# Patient Record
Sex: FEMALE | Race: WHITE | Employment: OTHER | URBAN - METROPOLITAN AREA
[De-identification: names, ages, dates, MRNs, and addresses within clinical notes are randomized per-mention and may not be internally consistent; named-entity substitution may affect disease eponyms.]

---

## 2017-04-11 ENCOUNTER — OFFICE VISIT (OUTPATIENT)
Dept: ORTHOPEDIC SURGERY | Age: 67
End: 2017-04-11

## 2017-04-11 VITALS
SYSTOLIC BLOOD PRESSURE: 137 MMHG | HEIGHT: 69 IN | BODY MASS INDEX: 27.55 KG/M2 | WEIGHT: 186 LBS | DIASTOLIC BLOOD PRESSURE: 70 MMHG | HEART RATE: 82 BPM | TEMPERATURE: 98.1 F | OXYGEN SATURATION: 99 % | RESPIRATION RATE: 18 BRPM

## 2017-04-11 DIAGNOSIS — M79.2 NEURITIS: ICD-10-CM

## 2017-04-11 DIAGNOSIS — M51.36 DDD (DEGENERATIVE DISC DISEASE), LUMBAR: ICD-10-CM

## 2017-04-11 DIAGNOSIS — M53.3 SACROILIAC JOINT PAIN: ICD-10-CM

## 2017-04-11 DIAGNOSIS — M54.5 LOW BACK PAIN, UNSPECIFIED BACK PAIN LATERALITY, UNSPECIFIED CHRONICITY, WITH SCIATICA PRESENCE UNSPECIFIED: Primary | ICD-10-CM

## 2017-04-11 DIAGNOSIS — M47.816 LUMBAR FACET ARTHROPATHY: ICD-10-CM

## 2017-04-11 PROBLEM — M54.50 LOW BACK PAIN: Status: ACTIVE | Noted: 2017-04-11

## 2017-04-11 RX ORDER — SERTRALINE HYDROCHLORIDE 50 MG/1
TABLET, FILM COATED ORAL
COMMUNITY
Start: 2017-03-14 | End: 2019-06-28

## 2017-04-11 RX ORDER — IBUPROFEN 200 MG
600 TABLET ORAL
COMMUNITY
End: 2020-03-03

## 2017-04-11 RX ORDER — BISMUTH SUBSALICYLATE 262 MG
1 TABLET,CHEWABLE ORAL DAILY
COMMUNITY

## 2017-04-11 RX ORDER — TRAMADOL HYDROCHLORIDE 50 MG/1
TABLET ORAL
COMMUNITY
Start: 2017-04-03 | End: 2019-06-28

## 2017-04-11 RX ORDER — PRAMIPEXOLE DIHYDROCHLORIDE 0.5 MG/1
0.5 TABLET ORAL
COMMUNITY
Start: 2017-03-14

## 2017-04-11 RX ORDER — CHOLECALCIFEROL (VITAMIN D3) 125 MCG
1 CAPSULE ORAL DAILY
COMMUNITY

## 2017-04-11 RX ORDER — PREDNISONE 5 MG/1
2.5 TABLET ORAL EVERY OTHER DAY
Status: ON HOLD | COMMUNITY
Start: 2017-03-07 | End: 2019-07-02

## 2017-04-11 RX ORDER — GABAPENTIN 300 MG/1
300 CAPSULE ORAL
COMMUNITY
Start: 2017-03-27 | End: 2017-07-12 | Stop reason: SDUPTHER

## 2017-04-11 RX ORDER — GABAPENTIN 300 MG/1
CAPSULE ORAL
Qty: 90 CAP | Refills: 1 | Status: SHIPPED | OUTPATIENT
Start: 2017-04-11 | End: 2017-04-27 | Stop reason: ALTCHOICE

## 2017-04-11 RX ORDER — PRAVASTATIN SODIUM 80 MG/1
80 TABLET ORAL
COMMUNITY
Start: 2017-03-14

## 2017-04-11 NOTE — PATIENT INSTRUCTIONS
Sentons Activation    Thank you for requesting access to Sentons. Please follow the instructions below to securely access and download your online medical record. Sentons allows you to send messages to your doctor, view your test results, renew your prescriptions, schedule appointments, and more. How Do I Sign Up? 1. In your internet browser, go to [email or web address]  2. Click on the First Time User? Click Here link in the Sign In box. You will be redirect to the New Member Sign Up page. 3. Enter your Sentons Access Code exactly as it appears below. You will not need to use this code after youve completed the sign-up process. If you do not sign up before the expiration date, you must request a new code. Sentons Access Code: [email or web address]  Expires: 7/10/2017 11:40 AM (This is the date your Sentons access code will )    4. Enter the last four digits of your Social Security Number (xxxx) and Date of Birth (mm/dd/yyyy) as indicated and click Submit. You will be taken to the next sign-up page. 5. Create a Sentons ID. This will be your Sentons login ID and cannot be changed, so think of one that is secure and easy to remember. 6. Create a Sentons password. You can change your password at any time. 7. Enter your Password Reset Question and Answer. This can be used at a later time if you forget your password. 8. Enter your e-mail address. You will receive e-mail notification when new information is available in [address] 9. Click Sign Up. You can now view and download portions of your medical record. 10. Click the Download Summary menu link to download a portable copy of your medical information. Additional Information    If you have questions, please visit the Frequently Asked Questions section of the Sentons website at [email or web address] Remember, Sentons is NOT to be used for urgent needs. For medical emergencies, dial 911.          Low Back Arthritis: Exercises  Your Care Instructions  Here are some examples of typical rehabilitation exercises for your condition. Start each exercise slowly. Ease off the exercise if you start to have pain. Your doctor or physical therapist will tell you when you can start these exercises and which ones will work best for you. When you are not being active, find a comfortable position for rest. Some people are comfortable on the floor or a medium-firm bed with a small pillow under their head and another under their knees. Some people prefer to lie on their side with a pillow between their knees. Don't stay in one position for too long. Take short walks (10 to 20 minutes) every 2 to 3 hours. Avoid slopes, hills, and stairs until you feel better. Walk only distances you can manage without pain, especially leg pain. How to do the exercises  Pelvic tilt    1. Lie on your back with your knees bent. 2. \"Brace\" your stomach--tighten your muscles by pulling in and imagining your belly button moving toward your spine. 3. Press your lower back into the floor. You should feel your hips and pelvis rock back. 4. Hold for 6 seconds while breathing smoothly. 5. Relax and allow your pelvis and hips to rock forward. 6. Repeat 8 to 12 times. Back stretches    1. Get down on your hands and knees on the floor. 2. Relax your head and allow it to droop. Round your back up toward the ceiling until you feel a nice stretch in your upper, middle, and lower back. Hold this stretch for as long as it feels comfortable, or about 15 to 30 seconds. 3. Return to the starting position with a flat back while you are on your hands and knees. 4. Let your back sway by pressing your stomach toward the floor. Lift your buttocks toward the ceiling. 5. Hold this position for 15 to 30 seconds. 6. Repeat 2 to 4 times. Follow-up care is a key part of your treatment and safety.  Be sure to make and go to all appointments, and call your doctor if you are having problems. It's also a good idea to know your test results and keep a list of the medicines you take. Where can you learn more? Go to http://candice-abby.info/. Enter R633 in the search box to learn more about \"Low Back Arthritis: Exercises. \"  Current as of: May 23, 2016  Content Version: 11.2  © 5888-7681 N42. Care instructions adapted under license by ParentsWare (which disclaims liability or warranty for this information). If you have questions about a medical condition or this instruction, always ask your healthcare professional. [place] [address] any warranty or liability for your use of this information.

## 2017-04-11 NOTE — MR AVS SNAPSHOT
Visit Information Date & Time Provider Department Dept. Phone Encounter #  
 4/11/2017 12:50 PM Marquez Garza MD [facility] [place] [id number] Follow-up Instructions Return in about 2 weeks (around 4/25/2017) for Diagnostic Test follow up, Medication follow up. Upcoming Health Maintenance Date Due Hepatitis C Screening 1950 DTaP/Tdap/Td series (1 - Tdap) 9/4/1969 BREAST CANCER SCRN MAMMOGRAM 9/4/1998 FOBT Q 1 YEAR AGE 50-75 9/4/1998 ZOSTER VACCINE AGE 60> 9/4/2008 GLAUCOMA SCREENING Q2Y 9/4/2013 OSTEOPOROSIS SCREENING (DEXA) 9/4/2013 Pneumococcal 65+ Low/Medium Risk (1 of 2 - PCV13) 9/4/2013 MEDICARE YEARLY EXAM 9/4/2013 INFLUENZA AGE 9 TO ADULT 8/1/2016 Allergies as of 4/11/2017  Review Complete On: 4/11/2017 By: Sharee Avina LPN No Known Allergies Current Immunizations  Never Reviewed No immunizations on file. Not reviewed this visit You Were Diagnosed With   
  
 Codes Comments Low back pain, unspecified back pain laterality, unspecified chronicity, with sciatica presence unspecified    -  Primary ICD-10-CM: M54.5 ICD-9-CM: 724.2 DDD (degenerative disc disease), lumbar     ICD-10-CM: M51.36 
ICD-9-CM: 722.52 Neuritis     ICD-10-CM: M79.2 ICD-9-CM: 729.2 Lumbar facet arthropathy (HCC)     ICD-10-CM: M12.88 ICD-9-CM: 721.3 Sacroiliac joint pain     ICD-10-CM: M53.3 ICD-9-CM: 724.6 Vitals BP Pulse Temp Resp Height(growth percentile) Weight(growth percentile)  
 137/70 82 98.1 °F (36.7 °C) (Oral) 18 5' 8.5\" (1.74 m) 186 lb (84.4 kg) SpO2 BMI OB Status Smoking Status 99% 27.87 kg/m2 Perimenopausal Never Smoker BMI and BSA Data Body Mass Index Body Surface Area  
 27.87 kg/m 2 2.02 m 2 [facility]erred [facility] Your Updated Medication List  
  
   
This list is accurate as of: 4/11/17  2:01 PM.  Always use your most recent med list. ADVIL 200 mg tablet Generic drug:  ibuprofen Take  by mouth. FISH  mg Cap Generic drug:  Omega-3 Fatty Acids Take  by mouth.  
  
 gabapentin 300 mg capsule Commonly known as:  NEURONTIN Take 300 mg by mouth nightly. multivitamin tablet Commonly known as:  ONE A DAY Take 1 Tab by mouth daily. pramipexole 0.5 mg tablet Commonly known as:  MIRAPEX  
  
 pravastatin 80 mg tablet Commonly known as:  PRAVACHOL  
  
 predniSONE 5 mg tablet Commonly known as:  DELTASONE  
  
 sertraline 50 mg tablet Commonly known as:  ZOLOFT  
  
 traMADol 50 mg tablet Commonly known as:  ULTRAM  
  
 VITAMIN D3 2,000 unit Tab Generic drug:  cholecalciferol (vitamin D3) Take  by mouth. We Performed the Following AMB POC XRAY, SPINE, LUMBOSACRAL; 4+ [id number] CPT(R)] Follow-up Instructions Return in about 2 weeks (around 4/25/2017) for Diagnostic Test follow up, Medication follow up. To-Do List   
 04/18/2017 Imaging:  MRI LUMB SPINE WO CONT Patient Instructions Northstar Bioscienceshart Activation Thank you for requesting access to [email or web address]. Please follow the instructions below to securely access and download your online medical record. [email or web address] allows you to send messages to your doctor, view your test results, renew your prescriptions, schedule appointments, and more. How Do I Sign Up? 1. In your internet browser, go to [email or web address] 
2. Click on the First Time User? Click Here link in the Sign In box. You will be redirect to the New Member Sign Up page. 3. Enter your [email or web address] Access Code exactly as it appears below. You will not need to use this code after youve completed the sign-up process. If you do not sign up before the expiration date, you must request a new code. 37coins Access Code: [email or web address] Expires: 7/10/2017 11:40 AM (This is the date your 37coins access code will ) 4. Enter the last four digits of your Social Security Number (xxxx) and Date of Birth (mm/dd/yyyy) as indicated and click Submit. You will be taken to the next sign-up page. 5. Create a 37coins ID. This will be your 37coins login ID and cannot be changed, so think of one that is secure and easy to remember. 6. Create a 37coins password. You can change your password at any time. 7. Enter your Password Reset Question and Answer. This can be used at a later time if you forget your password. 8. Enter your e-mail address. You will receive e-mail notification when new information is available in [address] 9. Click Sign Up. You can now view and download portions of your medical record. 10. Click the Download Summary menu link to download a portable copy of your medical information. Additional Information If you have questions, please visit the Frequently Asked Questions section of the 37coins website at [email or web address] Remember, 37coins is NOT to be used for urgent needs. For medical emergencies, dial 911. Low Back Arthritis: Exercises Your Care Instructions Here are some examples of typical rehabilitation exercises for your condition. Start each exercise slowly. Ease off the exercise if you start to have pain. Your doctor or physical therapist will tell you when you can start these exercises and which ones will work best for you. When you are not being active, find a comfortable position for rest. Some people are comfortable on the floor or a medium-firm bed with a small pillow under their head and another under their knees. Some people prefer to lie on their side with a pillow between their knees. Don't stay in one position for too long. Take short walks (10 to 20 minutes) every 2 to 3 hours.  Avoid slopes, hills, and stairs until you feel better. Walk only distances you can manage without pain, especially leg pain. How to do the exercises Pelvic tilt 1. Lie on your back with your knees bent. 2. \"Brace\" your stomachtighten your muscles by pulling in and imagining your belly button moving toward your spine. 3. Press your lower back into the floor. You should feel your hips and pelvis rock back. 4. Hold for 6 seconds while breathing smoothly. 5. Relax and allow your pelvis and hips to rock forward. 6. Repeat 8 to 12 times. Back stretches 1. Get down on your hands and knees on the floor. 2. Relax your head and allow it to droop. Round your back up toward the ceiling until you feel a nice stretch in your upper, middle, and lower back. Hold this stretch for as long as it feels comfortable, or about 15 to 30 seconds. 3. Return to the starting position with a flat back while you are on your hands and knees. 4. Let your back sway by pressing your stomach toward the floor. Lift your buttocks toward the ceiling. 5. Hold this position for 15 to 30 seconds. 6. Repeat 2 to 4 times. Follow-up care is a key part of your treatment and safety. Be sure to make and go to all appointments, and call your doctor if you are having problems. It's also a good idea to know your test results and keep a list of the medicines you take. Where can you learn more? Go to http://candice-abby.info/. Enter Q118 in the search box to learn more about \"Low Back Arthritis: Exercises. \" Current as of: May 23, 2016 Content Version: 11.2 © 8572-7325 Healthwise, Incorporated. Care instructions adapted under license by Sapient (which disclaims liability or warranty for this information). If you have questions about a medical condition or this instruction, always ask your healthcare professional. [address] any warranty or liability for your use of this information. Introducing [facility]! Tracy Perez introduces Nonabox patient portal. Now you can access parts of your medical record, email your doctor's office, and request medication refills online. 1. In your internet browser, go to [email or web address] 2. Click on the First Time User? Click Here link in the Sign In box. You will see the New Member Sign Up page. 3. Enter your Nonabox Access Code exactly as it appears below. You will not need to use this code after youve completed the sign-up process. If you do not sign up before the expiration date, you must request a new code. · Nonabox Access Code: [email or web address] Expires: 7/10/2017 11:40 AM 
 
4. Enter the last four digits of your Social Security Number (xxxx) and Date of Birth (mm/dd/yyyy) as indicated and click Submit. You will be taken to the next sign-up page. 5. Create a Nonabox ID. This will be your Nonabox login ID and cannot be changed, so think of one that is secure and easy to remember. 6. Create a Nonabox password. You can change your password at any time. 7. Enter your Password Reset Question and Answer. This can be used at a later time if you forget your password. 8. Enter your e-mail address. You will receive e-mail notification when new information is available in [address] 9. Click Sign Up. You can now view and download portions of your medical record. 10. Click the Download Summary menu link to download a portable copy of your medical information. If you have questions, please visit the Frequently Asked Questions section of the Nonabox website. Remember, Nonabox is NOT to be used for urgent needs. For medical emergencies, dial 911. Now available from your iPhone and Android! Please provide this summary of care documentation to your next provider. Your primary care clinician is listed as Nichelle Rodriguez. If you have any questions after today's visit, please call [phone].

## 2017-04-11 NOTE — PROGRESS NOTES
[facility]  [address] [place], [facility] [address]  Phone: [phone]  Fax: [phone]          HISTORY OF PRESENT ILLNESS:  Maggie Hernandez is a 74 y.o. female with history of lumbar pain. She c/o pain in the lower back, L>R, with numbness and shooting pain radiating down the left leg past the knee. Pt's pain is worse when sitting. She is currently in physical therapy and admits to experiencing relief with cervical traction. Pt reports that after her last office visit, she started to experience numbness in the right arm with severe pain in the neck and shoulders. She followed up with Dr. Grisel Dobson who then referred her to Dr. Kia Ulloa. Pt then had cervical fusion at C6-7 and reports that she has had a total of 4 cervical surgeries. Pt reports that she also had thoracic to have a rib removed and coccyx surgery but denies any prior lumbar surgery. Pt admits to experiencing \"loopiness,\" dizziness, and states that she fell when trying Lyrica. She has tried Neurontin 300 mg 1 tab daily and tolerated the medication. Pt reports that she was switched to Lyrica in order to better manage her symptoms. Pt at this time desires to proceed with a lumbar MRI and medication evaluation. Pain Scale: 5/10    PCP: Felice Adams DO       History reviewed. No pertinent past medical history. Social History     Social History    Marital status:      Spouse name: N/A    Number of children: N/A    Years of education: N/A     Occupational History    Not on file. Social History Main Topics    Smoking status: Never Smoker    Smokeless tobacco: Never Used    Alcohol use No    Drug use: No    Sexual activity: No     Other Topics Concern    Not on file     Social History Narrative           No Known Allergies      REVIEW OF SYSTEMS    Constitutional: Negative for fever, chills, or weight change. Respiratory: Negative for cough or shortness of breath. Cardiovascular: Negative for chest pain or palpitations. Gastrointestinal: Negative for acid reflux, change in bowel habits, or constipation. Genitourinary: Negative for dysuria and flank pain. Musculoskeletal: Positive for lumbar pain. Skin: Negative for rash. Neurological: Positive for numbness in the left leg. Negative for headaches or dizziness. Endo/Heme/Allergies: Negative for increased bruising. Psychiatric/Behavioral: Negative for difficulty with sleep. PHYSICAL EXAMINATION  Visit Vitals    /70    Pulse 82    Temp 98.1 °F (36.7 °C) (Oral)    Resp 18    Ht 5' 8.5\" (1.74 m)    Wt 186 lb (84.4 kg)    SpO2 99%    BMI 27.87 kg/m2       Constitutional: Awake, alert, and in no acute distress  Neurological: 1+ symmetrical DTRs in the upper extremities. 1+ symmetrical DTRs in the lower extremities. Sensation to light touch is intact. Negative Carmine's sign bilaterally. Skin: warm, dry, and intact. Musculoskeletal: Tenderness to palpation in the upper thoracic region. Tenderness to palpation in the lower lumbar region. Pain with palpation over the right SI joint. Mild pain with extension and axial loading, No different with forward flexion. No pain with internal or external rotation of her hips. Positive straight leg raise on the right. Biceps  Triceps Deltoids Wrist Ext Wrist Flex Hand Intrin   Right +4/5 +4/5 +4/5 +4/5 +4/5 +4/5   Left +4/5 +4/5 +4/5 +4/5 +4/5 +4/5      Hip Flex  Quads Hamstrings Ankle DF EHL Ankle PF   Right +4/5 +4/5 +4/5 +4/5 +4/5 +4/5   Left  4/5  4/5  4/5  4/5  4/5  4/5     IMAGING:    Lumbar spine 4V X-rays from 04/11/2107 were personally reviewed with the Pt and demonstrated:  1) Levoscoliosis. 2) Multilevel degenerative facets. 3) Sclerosis in both SI joints. 4) Mild degenerative findings in both hips. 5) Multilevel degenerative discs.     Lumbar spine MRI from 04/01/2014 was personally reviewed with the Pt and demonstrated:  FINDINGS:     Normal alignment and vertebral body heights. Nonpathologic marrow signal except  for mild endplate degenerative changes at L1-2 and L2-3. The conus medullaris  looks normal ending at L1. Cauda equina nerve roots look normal. Visualized  paraspinal soft tissues are unremarkable.     Spinal canal and neural foramen:     L1-2: Moderate disc height narrowing with diffuse bulging. Small  anterolaterally protruding osteophytes. Relative mild spinal canal narrowing  and minimal bilateral foraminal encroachment. No neural compression.     L2-3: Moderate disc height narrowing with diffuse bulging. Small  anterolaterally protruding osteophytes. Mild facet hypertrophy. Again there is  relative mild central spinal canal narrowing and minimal bilateral foraminal  encroachment however no neural compression.     L3-4: Minimal disc height narrowing with diffuse bulge. Mild facet and  ligamentum flavum hypertrophy. Prominent dorsal epidural fat. Overall there is  moderate flattening of the thecal sac. Mild left and minimal right foraminal  encroachment without neural compression.     L4-5: Small nonfocal disc protrusion with focus of hyperintensity in the right  posterolateral annulus consistent with an annular tear. No associated disc  protrusion. Mild facet hypertrophy. No spinal canal or foraminal narrowing.     L5-S1: No disc disease. Mild facet hypertrophy, right greater than left. No  spinal canal or foraminal narrowing.        IMPRESSION:     Moderate spinal canal stenosis at L3-4 on the basis of disc bulge and prominent  dorsal epidural fat.     Mild spinal canal narrowing at L1-2 and L2-3 on the basis of disc bulges.     No evidence of neural compression.     Annular tear at L4-5 but without associated focal disc herniation. ASSESSMENT   Karen Jones was seen today for back pain.     Diagnoses and all orders for this visit:    Low back pain, unspecified back pain laterality, unspecified chronicity, with sciatica presence unspecified  -     [[id number]] LS Spine 4V  -     MRI LUMB SPINE WO CONT; Future    DDD (degenerative disc disease), lumbar  -     MRI LUMB SPINE WO CONT; Future  -     gabapentin (NEURONTIN) 300 mg capsule; Take 1 cap po q am and 2 caps po q pm. Taper up as directed. Neuritis  -     MRI LUMB SPINE WO CONT; Future  -     diazePAM (VALIUM) 5 mg tablet; Take 1 tab po 30mins before procedure. May repeat x 1 if needed. Indications: anxiety  -     gabapentin (NEURONTIN) 300 mg capsule; Take 1 cap po q am and 2 caps po q pm. Taper up as directed. Lumbar facet arthropathy (HCC)  -     MRI LUMB SPINE WO CONT; Future    Sacroiliac joint pain       IMPRESSION AND PLAN:  Ying Hemphill is a 74 y.o. female with history of lumbar pain. She c/o pain in the lower back, L>R, with numbness and shooting pain radiating down the left leg past the knee. Pt had a fusion at C6-7 and reports that she has had a total of 4 cervical surgeries. Pt reports that she also had thoracic to have a rib removed and coccyx surgery but denies any prior lumbar surgery. 1) Pt was given information on lumbar arthritis exercises. 2) Discussed treatment options with the Pt, including medication and steroid injections. 3) A sitting lumbar MRI was ordered. 4) She was prescribed Neurontin 300 mg 1 tab QAM and 2 tabs QHS, tapering up as directed. 5) Ms. Penny Neri has a reminder for a \"due or due soon\" health maintenance. I have asked that she contact her primary care provider, Dandy Zamudio DO, for follow-up on this health maintenance. 6)  reviewed. 7) Pt will follow-up in 2-3 weeks. Written by Jose Srinivasan, as dictated by Hakeem Jacobs MD.  I, Dr. Hakeem Jacobs confirm that all documentation is accurate.

## 2017-04-12 ENCOUNTER — TELEPHONE (OUTPATIENT)
Dept: ORTHOPEDIC SURGERY | Age: 67
End: 2017-04-12

## 2017-04-12 RX ORDER — DIAZEPAM 5 MG/1
TABLET ORAL
Qty: 2 TAB | Refills: 0
Start: 2017-04-12 | End: 2017-04-27 | Stop reason: ALTCHOICE

## 2017-04-12 NOTE — TELEPHONE ENCOUNTER
PATIENT CALLED FOR DR. Sonido Vivas. PATIENT SAID THAT SHE IS SCHEDULE TO GET AN MRI DONE ON 04/19/17. PATIENT SAID THAT SHE IS CLAUSTROPHOBIC AND THAT SHE CAN NOT HAVE HER HEAD COVERED WHEN SHE GET THE MRI DONE. PATIENT STATES THAT  TOLD HER THAT SHE WILL NOT. PATIENT CALLED THE [facility] LOCATION WHERE SHE IS GETTING THE MRI DONE AND THEY TOLD HER THAT YES HER HEAD HAS TO GO IN FIRST. PATIENT IS REQUESTING TO SPEAK TO DR. HARRIS ABOUT THIS. PATIENT TEL. [phone].

## 2017-04-13 NOTE — TELEPHONE ENCOUNTER
Patient called to advise there's a facility in [place] she would like to use.   Please advise patient as soon as possible at [phone]

## 2017-04-13 NOTE — TELEPHONE ENCOUNTER
Spoke with Wagner Stiles in scheduling, per Wagner Stiles patient could have MRI at [facility] in a machine that would be feet first, with face possibly out of machine. Face may be right at the edge of machine. Called patient informed of above, per patient, she would rather just go to the MRI place in [place] that will be done in a seat. Informed patient, we will fax the order to them, and they will be in contact to schedule. Patient verbalized agreement/understanding. Kristine aware, order given to Wagner Stiles in scheduling. No further action required at this time.

## 2017-04-27 ENCOUNTER — OFFICE VISIT (OUTPATIENT)
Dept: ORTHOPEDIC SURGERY | Age: 67
End: 2017-04-27

## 2017-04-27 VITALS
HEIGHT: 69 IN | WEIGHT: 188 LBS | SYSTOLIC BLOOD PRESSURE: 158 MMHG | BODY MASS INDEX: 27.85 KG/M2 | HEART RATE: 93 BPM | DIASTOLIC BLOOD PRESSURE: 76 MMHG | TEMPERATURE: 98.6 F | RESPIRATION RATE: 14 BRPM

## 2017-04-27 DIAGNOSIS — M47.816 LUMBAR FACET ARTHROPATHY: ICD-10-CM

## 2017-04-27 DIAGNOSIS — M79.2 NEURITIS: ICD-10-CM

## 2017-04-27 DIAGNOSIS — M48.061 LUMBAR SPINAL STENOSIS: Primary | ICD-10-CM

## 2017-04-27 NOTE — PATIENT INSTRUCTIONS
Low Back Arthritis: Exercises  Your Care Instructions  Here are some examples of typical rehabilitation exercises for your condition. Start each exercise slowly. Ease off the exercise if you start to have pain. Your doctor or physical therapist will tell you when you can start these exercises and which ones will work best for you. When you are not being active, find a comfortable position for rest. Some people are comfortable on the floor or a medium-firm bed with a small pillow under their head and another under their knees. Some people prefer to lie on their side with a pillow between their knees. Don't stay in one position for too long. Take short walks (10 to 20 minutes) every 2 to 3 hours. Avoid slopes, hills, and stairs until you feel better. Walk only distances you can manage without pain, especially leg pain. How to do the exercises  Pelvic tilt    1. Lie on your back with your knees bent. 2. \"Brace\" your stomach--tighten your muscles by pulling in and imagining your belly button moving toward your spine. 3. Press your lower back into the floor. You should feel your hips and pelvis rock back. 4. Hold for 6 seconds while breathing smoothly. 5. Relax and allow your pelvis and hips to rock forward. 6. Repeat 8 to 12 times. Back stretches    1. Get down on your hands and knees on the floor. 2. Relax your head and allow it to droop. Round your back up toward the ceiling until you feel a nice stretch in your upper, middle, and lower back. Hold this stretch for as long as it feels comfortable, or about 15 to 30 seconds. 3. Return to the starting position with a flat back while you are on your hands and knees. 4. Let your back sway by pressing your stomach toward the floor. Lift your buttocks toward the ceiling. 5. Hold this position for 15 to 30 seconds. 6. Repeat 2 to 4 times. Follow-up care is a key part of your treatment and safety.  Be sure to make and go to all appointments, and call your doctor if you are having problems. It's also a good idea to know your test results and keep a list of the medicines you take. Where can you learn more? Go to http://candice-abby.info/. Enter L170 in the search box to learn more about \"Low Back Arthritis: Exercises. \"  Current as of: May 23, 2016  Content Version: 11.2  © 1572-7126 [employer]. Care instructions adapted under license by [employer] (which disclaims liability or warranty for this information). If you have questions about a medical condition or this instruction, always ask your healthcare professional. [place] [address] any warranty or liability for your use of this information. Learning About Medial Branch Block and Neurotomy  What are medial branch block and neurotomy? Facet joints connect your vertebrae to each other. Problems in these joints can cause chronic (long-term) pain in the neck or back. They can sometimes affect the shoulders, arms, buttocks, or legs. Medial branch nerves are the nerves that carry many of the pain messages from your facet joints. Radiofrequency medial branch neurotomy is a type of medial branch neurotomy that is used to relieve arthritis pain. It uses radio waves to damage nerves in your neck or back so that they can no longer send pain messages to your brain. Before your doctor knows if a neurotomy will help you, he or she will do a medial branch block to find out if certain nerves are the ones that are a source of your pain. You will need two separate visits to the outpatient center or hospital to have both procedures. How is a medial branch block done? The doctor will use a tiny needle to numb the skin where you will get the block. Then he or she puts the block needle into the numbed area. You may feel some pressure, but you should not feel pain.  Using fluoroscopy (live X-ray) to guide the needle, the doctor injects medicine onto one or more nerves to make them numb. If you get relief from your pain in the next 4 to 6 hours, it's a sign that those nerves may be contributing to your pain. The relief will last only a short time. You may then have a medial branch neurotomy at a later visit to try to get longer relief. It takes 20 to 30 minutes to get the block. You can go home after the doctor watches you for about an hour. You will get instructions on how to report how much pain you have when you are at home. You will need someone to drive you home. How is medial branch neurotomy done? The doctor will use a tiny needle to numb the skin where you will get the neurotomy. Then he or she puts the neurotomy needle into the numbed area. You may feel some pressure. Using fluoroscopy (live X-ray) to guide the needle, the doctor sends radio waves through the needle to the nerve for 60 to 90 seconds. The radio waves heat the nerve, which damages it. The doctor may do this several times. And he or she may treat more than one nerve. It takes 45 to 90 minutes to get a neurotomy, depending on how many nerves are heated. You will probably go home 30 to 60 minutes later. You will need someone to drive you home. What can you expect after a neurotomy? You may feel a little sore or tender at the injection site at first. But after a successful neurotomy, most people have pain relief right away. It often lasts for 9 to 12 months or longer. Sometimes the pain relief is permanent. If your pain does come back, it may mean that the damaged nerve has healed and can send pain messages again. Or it can mean that a different nerve is causing pain. Your doctor will discuss your options with you. Follow-up care is a key part of your treatment and safety. Be sure to make and go to all appointments, and call your doctor if you are having problems. It's also a good idea to know your test results and keep a list of the medicines you take. Where can you learn more?   Go to http://candice-abby.info/. Enter P699 in the search box to learn more about \"Learning About Medial Branch Block and Neurotomy. \"  Current as of: October 14, 2016  Content Version: 11.2  © 7296-9618 [employer]. Care instructions adapted under license by [employer] (which disclaims liability or warranty for this information). If you have questions about a medical condition or this instruction, always ask your healthcare professional. [address] any warranty or liability for your use of this information.

## 2017-04-27 NOTE — LETTER
[facility] Request Form for [facility] Scheduling - Latia Jimenez Fax to [phone]  Telephone: [phone] To be completed by Physician Office: 
 
Date: 2017    Requested by: Elsa Gallardo Phone No: [phone]   Fax No:  [phone] Surgery Date: 5/3/17   Requested Time: 1:00 Provider: DR. Guerline Vizcaino CPT CODE*  Procedure 76908 SNRB Left L3  
30110 SNRB Left l4 *CPT code is required for ALL procedures. Patient Information: 
 
Name: Nika Prcie SSN: [id number]  : 1950   Male/Female: female Home Phone No: [phone] (home) Primary Insurance: [employer] Policy Number: [id number] ICD10 code(s): M48.06,M79.2 ICD9 code:    Diagnosis:  Stenosis,Neuritis Diabetic/finger stick to be done BS level must be <200 mg/dl Anesthesia Type Local: Valium 10 mg PO 30 minutes prior to procedure 

## 2017-04-27 NOTE — PROGRESS NOTES
[facility]  [address] [place], [facility] [address]  Phone: [phone]  Fax: [phone]      ASSESSMENT   Amado Garzon was seen today for back pain, leg pain and follow-up. Diagnoses and all orders for this visit:    Lumbar spinal stenosis  -     SCHEDULE SURGERY    Lumbar facet arthropathy ([address]  -     SCHEDULE SURGERY    Neuritis  -     SCHEDULE SURGERY         IMPRESSION AND PLAN:  Rod Boast is a 74 y.o. female with history of cervical and lumbar pain with left radicular symptoms. Pt c/o pain in the lower back that is worse when sitting. Her pain is better when laying down and Pt reports experiencing significant relief when sitting in a recliner. Pt is unable to go to [employer] due to lower back pain when sitting on the wooden benches. She reports that occasionally she experiences pain and numbness shooting down the left thigh. Pt reports that her left thigh pain occasionally radiates past the knee when turing in certain directions. She has tried Neurontin 300 mg 1 tab QHS with minimal relief. Pt has never tried Topamax. Pt at this time desires to proceed with steroid injections. 1) Pt was given information on lumbar arthritis exercises. 2) Discussed treatment options with the Pt, including steroid injections and a RFA. 3) Pt was given information on radiofrequency ablation. 4) I strongly recommended the Pt try water exercise. 5) Pt was scheduled for a left L3 and left L4 SNRB. 6) Ms. Iker Sierra has a reminder for a \"due or due soon\" health maintenance. I have asked that she contact her primary care provider, Shahida Faust DO, for follow-up on this health maintenance. 7)  demonstrated consistency with prescribing. 8) Pt will follow-up in 1 month. HISTORY OF PRESENT ILLNESS:  Rod Boast is a 74 y.o. female with history of cervical and lumbar pain with left radicular symptoms.  She presents to the office today for lumbar MRI follow up. Pt c/o pain in the lower back that is worse when sitting. Her pain is better when laying down and Pt reports experiencing significant relief when sitting in a recliner. Pt is unable to go to [employer] due to lower back pain when sitting on the wooden benches. She reports that occasionally she experiences pain and numbness shooting down the left thigh. Pt reports that her left thigh pain occasionally radiates past the knee when turing in certain directions. She has tried Neurontin 300 mg 1 tab QHS with minimal relief and admits to sedation when taking the medication in the morning. Pt has never tried Topamax. Pt at this time desires to proceed with steroid injections. Pain Scale: 3/10    PCP: Colonel Rebecca DO       History reviewed. No pertinent past medical history. Social History     Social History    Marital status:      Spouse name: N/A    Number of children: N/A    Years of education: N/A     Occupational History    Not on file. Social History Main Topics    Smoking status: Never Smoker    Smokeless tobacco: Never Used    Alcohol use No    Drug use: No    Sexual activity: No     Other Topics Concern    Not on file     Social History Narrative       Current Outpatient Prescriptions   Medication Sig Dispense Refill    gabapentin (NEURONTIN) 300 mg capsule Take 300 mg by mouth nightly.  pramipexole (MIRAPEX) 0.5 mg tablet       pravastatin (PRAVACHOL) 80 mg tablet       predniSONE (DELTASONE) 5 mg tablet Take 2.5 mg by mouth every other day.  sertraline (ZOLOFT) 50 mg tablet       traMADol (ULTRAM) 50 mg tablet       ibuprofen (ADVIL) 200 mg tablet Take  by mouth.  multivitamin (ONE A DAY) tablet Take 1 Tab by mouth daily.  cholecalciferol, vitamin D3, (VITAMIN D3) 2,000 unit tab Take  by mouth.  Omega-3 Fatty Acids (FISH OIL) 500 mg cap Take  by mouth.          No Known Allergies      REVIEW OF SYSTEMS    Constitutional: Negative for fever, chills, or weight change. Respiratory: Negative for cough or shortness of breath. Cardiovascular: Negative for chest pain or palpitations. Gastrointestinal: Negative for acid reflux, change in bowel habits, or constipation. Genitourinary: Negative for dysuria and flank pain. Musculoskeletal: Positive for lumbar pain. Skin: Negative for rash. Neurological: Positive for intermittent numbness in the left lower extremity. Negative for headaches or dizziness. Endo/Heme/Allergies: Negative for increased bruising. Psychiatric/Behavioral: Negative for difficulty with sleep. PHYSICAL EXAMINATION  Visit Vitals    /76    Pulse 93    Temp 98.6 °F (37 °C) (Oral)    Resp 14    Ht 5' 8.5\" (1.74 m)    Wt 188 lb (85.3 kg)    BMI 28.17 kg/m2       Constitutional: Awake, alert, and in no acute distress  Neurological: 1+ symmetrical DTRs in the upper extremities. 1+ symmetrical DTRs in the lower extremities. Sensation to light touch is intact. Negative Carmine's sign bilaterally. Skin: warm, dry, and intact. Musculoskeletal: Tenderness to palpation in the lower lumbar region. Pain with extension and axial loading. Better with forward flexion. No pain with internal or external rotation of her hips. Negative straight leg raise bilaterally. Biceps  Triceps Deltoids Wrist Ext Wrist Flex Hand Intrin   Right +4/5 +4/5 +4/5 +4/5 +4/5 +4/5   Left +4/5 +4/5 +4/5 +4/5 +4/5 +4/5      Hip Flex  Quads Hamstrings Ankle DF EHL Ankle PF   Right +4/5 +4/5 +4/5 +4/5 +4/5 +4/5   Left +4/5 +4/5 +4/5 +4/5 +4/5 +4/5     IMAGING:    Lumbar spine MRI from 04/20/2017 was personally reviewed with the Pt and demonstrated:  IMPRESSION:  Levoscoliosis. L3-4: Moderate central stenosis. Moderate posterior annular bulge. Grade 1 anterior degenerative listhesis. No evidence of advanced foraminal narrowing, lateral recess compromise, or advanced facet arthropathy.      Cervical spine MRI from 04/22/2014 was personally reviewed with the Pt and demonstrated:    Results from Hospital Encounter encounter on 04/22/14   MRI CERV SPINE WO CONT   Narrative EXAM:  MRI CERV SPINE WO CONT    INDICATION:   Neck pain    COMPARISON: None. TECHNIQUE: MR imaging of the cervical spine was performed including sagittal  T1, T2, STIR;  axial GRE, T2, T1. Contrast was not administered. FINDINGS:  Evidence of prior surgery with partial osseous fusion of C5-C6 and C6-C7. Please going with surgical history. Reversal of the cervical lordosis with apex  at C5-C6. Focal kyphosis and partial osseous fusion of C5-C6 with anterior  wedging of C6 vertebral body. Remainder vertebral bodies maintain normal  height. Slight anterior listhesis of C3 on C4. Mild degenerative discogenic  disease C3-C4, C4-C5 and C7-T1. The craniocervical junction is intact. The  course, caliber, and signal intensity of the spinal cord are normal.    C2/3:  Mild disc osteophyte complex. Marked left and mild right facet  arthropathy. Moderate to severe left foraminal stenosis. There is posterior  ligamentous hypertrophy. Mild to moderate narrowing of the central canal, AP  diameter is 7.7 mm.    C3/4:  Marked left and mild right facet hypertrophy. 8mm cystic structure just  lateral to the left facet joint image 18 axial T2 may represent facet synovial  cyst. Severe left foraminal stenosis. There is posterior ligamentous  hypertrophy. Moderate narrowing of the central canal, AP diameter is 7.1 mm. Dorsal and ventral CSF are effaced. C4/5:  Disc osteophyte complex. Moderate to severe left facet hypertrophy. Moderate left foraminal stenosis. Mild/moderate central canal stenosis, AP  diameter is 8 mm. C5/6:  Central canal and neural foramen are patent. C6/7:  Central canal and neural foramen are patent. C7/T1:  Central canal and neural foramen are patent. Impression IMPRESSION:  1.  Disc osteophyte complex and marked left facet arthropathy C2-C3 through  C4-C5 resulting in significant left foraminal stenosis and mild to moderate  central canal stenosis. 2. Postsurgical changes, detailed above. Please correlate with surgical history. Thank you for this referral.              Written by Lizzie Deal, as dictated by Jared Galvez MD.  I, Dr. Jared Galvez confirm that all documentation is accurate.

## 2017-05-02 DIAGNOSIS — M47.816 LUMBAR FACET ARTHROPATHY: ICD-10-CM

## 2017-05-02 DIAGNOSIS — M51.36 DDD (DEGENERATIVE DISC DISEASE), LUMBAR: ICD-10-CM

## 2017-05-02 DIAGNOSIS — M79.2 NEURITIS: ICD-10-CM

## 2017-05-02 DIAGNOSIS — M54.5 LOW BACK PAIN, UNSPECIFIED BACK PAIN LATERALITY, UNSPECIFIED CHRONICITY, WITH SCIATICA PRESENCE UNSPECIFIED: ICD-10-CM

## 2017-05-03 ENCOUNTER — APPOINTMENT (OUTPATIENT)
Dept: GENERAL RADIOLOGY | Age: 67
End: 2017-05-03
Attending: PHYSICAL MEDICINE & REHABILITATION
Payer: MEDICARE

## 2017-05-03 ENCOUNTER — HOSPITAL ENCOUNTER (OUTPATIENT)
Age: 67
Setting detail: OUTPATIENT SURGERY
Discharge: HOME | End: 2017-05-03
Attending: PHYSICAL MEDICINE & REHABILITATION | Admitting: PHYSICAL MEDICINE & REHABILITATION
Payer: MEDICARE

## 2017-05-03 VITALS
WEIGHT: 188 LBS | RESPIRATION RATE: 18 BRPM | OXYGEN SATURATION: 100 % | SYSTOLIC BLOOD PRESSURE: 144 MMHG | BODY MASS INDEX: 27.85 KG/M2 | HEART RATE: 78 BPM | HEIGHT: 69 IN | DIASTOLIC BLOOD PRESSURE: 74 MMHG | TEMPERATURE: 98 F

## 2017-05-03 PROCEDURE — 74011636320 HC RX REV CODE- 636/320

## 2017-05-03 PROCEDURE — 76010000009 HC PAIN MGT 0 TO 30 MIN PROC: Performed by: PHYSICAL MEDICINE & REHABILITATION

## 2017-05-03 PROCEDURE — 74011250636 HC RX REV CODE- 250/636

## 2017-05-03 PROCEDURE — 77030003676 HC NDL SPN MPRI -A: Performed by: PHYSICAL MEDICINE & REHABILITATION

## 2017-05-03 PROCEDURE — 77030003669 HC NDL SPN COOK -B: Performed by: PHYSICAL MEDICINE & REHABILITATION

## 2017-05-03 PROCEDURE — 74011250637 HC RX REV CODE- 250/637: Performed by: PHYSICAL MEDICINE & REHABILITATION

## 2017-05-03 PROCEDURE — 74011000250 HC RX REV CODE- 250

## 2017-05-03 RX ORDER — DEXAMETHASONE SODIUM PHOSPHATE 100 MG/10ML
INJECTION INTRAMUSCULAR; INTRAVENOUS AS NEEDED
Status: DISCONTINUED | OUTPATIENT
Start: 2017-05-03 | End: 2017-05-03 | Stop reason: HOSPADM

## 2017-05-03 RX ORDER — LIDOCAINE HYDROCHLORIDE 10 MG/ML
INJECTION, SOLUTION EPIDURAL; INFILTRATION; INTRACAUDAL; PERINEURAL AS NEEDED
Status: DISCONTINUED | OUTPATIENT
Start: 2017-05-03 | End: 2017-05-03 | Stop reason: HOSPADM

## 2017-05-03 RX ORDER — DIAZEPAM 5 MG/1
5-20 TABLET ORAL ONCE
Status: COMPLETED | OUTPATIENT
Start: 2017-05-03 | End: 2017-05-03

## 2017-05-03 RX ADMIN — DIAZEPAM 5 MG: 5 TABLET ORAL at 12:31

## 2017-05-03 NOTE — H&P
Date of Surgery Update:  Eulalio Shoulder was seen and examined. History and physical has been reviewed. The patient has been examined. There have been no significant clinical changes since the completion of the last office visit.       Signed By: Tobias Ulrich MD     May 3, 2017 12:45 PM

## 2017-05-03 NOTE — PROCEDURES
PROCEDURE NOTE      Patient Name: Tigist Fontanez    Date of Procedure: May 3, 2017    Preoperative Diagnosis:  Bilateral stenosis of lateral recess of lumbar spine [M48.06]  Acute neuritis [M79.2]    Post Operative Diagnosis:  Bilateral stenosis of lateral recess of lumbar spine [M48.06]  Acute neuritis [M79.2]    Procedure :    left L3 Selective Nerve Root Block  left L4 Selective Nerve Root Block    Consent:  Informed consent was obtained prior to the procedure. The patient was given the opportunity to ask questions regarding the procedure and its associated risks. In addition to the potential risks associated with the procedure itself, the patient was informed both verbally and in writing of the potential side effects of the use of glucocorticoid. The patient appeared to comprehend the informed consent and desired to have the procedure performed. Procedure: The patient was placed in the prone position on the fluoroscopy table and the back was prepped and draped in the usual sterile manner. The exact spinal level was  identified using fluoroscopy, and Lidocaine 1 % was injected locally, a # 22 gauge spinal needle was passed to the transverse process. The depth was noted and the needle redirected to pass inferior and approximately one cm anterior to the transverse process. YES  1 cc of Isovue M-200 was used to verify positioning in the epidural and paravertebral space and outlined the course of the spinal nerve into the epidural space. The same procedure was repeated at each spinal level indicated above. A total of 30 mg of preservative free Dexamethasone and 5 cc of Lidocaine was slowly injected. The patient tolerated the procedure well. The injection area was cleaned and bandaids applied. Not excessive bleeding was noted. Patient dressed and discharged to home with instructions. Discussion: The patient tolerated the procedure well.                                               Kylah Abel Marylee Logan, MD  May 3, 2017

## 2017-05-03 NOTE — DISCHARGE INSTRUCTIONS
[facility]   ([facility])  Dr. Deirdre Rico, Dr. Carolina Recinos, Dr. Vasquez Layman not drive a car, operate heavy machinery or dangerous equipment for 24 hours. * Activity as tolerated; rest for the remainder of the day. * Resume pre-block medications including those for your family doctor. * Do not drink alcoholic beverages for 24 hours. Alcohol and the medications you have received may interact and cause an adverse reaction. * You may feel better this evening and worse tomorrow, as the numbing medications wears off and the steroid has yet to begin to work. After 48 hrs the steroid should begin to release bringing you relief. * You may shower this evening and remove any bandages. * Avoid hot tubs and heating pads for 24 hours. You may use cold packs on the procedure site as tolerated for the first 24 hours. * If a headache develops, drink plenty of fluids and rest.  Take over the counter medications for headache if needed. If the headache continues longer than 24 hours, call MD at the [address] [place] [address]. [phone]    * Continue taking pain medications as needed. * You may resume your regular diet if tolerated. Otherwise, start with sips of water and advance slowly. * If Diabetic: check your blood sugar three times a day for the next 3 days. If your sugar is greater than 300 call your family doctor. If your sugar is greater than 400, have someone transport you to the nearest Emergency Room. * If you experience any of the following problems, Please Call the [address] [place] [address] at [phone].         * Shortness of Breath    * Fever of 101 or higher    * Nausea / Vomiting    * Severe Headache    * Weakness or numbness in arms or legs that is not      resolving    * Prolonged increase in pain greater than 4 days      DISCHARGE SUMMARY from Nurse      PATIENT INSTRUCTIONS:    After oral sedation, for 24 hours or while taking prescription Narcotics:  · Limit your activities  · Do not drive and operate hazardous machinery  · Do not make important personal or business decisions  · Do  not drink alcoholic beverages  · If you have not urinated within 8 hours after discharge, please contact your surgeon on call. Report the following to your surgeon:  · Excessive pain, swelling, redness or odor of or around the surgical area  · Temperature over 101  · Nausea and vomiting lasting longer than 4 hours or if unable to take medications  · Any signs of decreased circulation or nerve impairment to extremity: change in color, persistent  numbness, tingling, coldness or increase pain  · Any questions            What to do at Home:  Recommended activity: Activity as tolerated, NO DRIVING FOR 12 Hours post injection          *  Please give a list of your current medications to your Primary Care Provider. *  Please update this list whenever your medications are discontinued, doses are      changed, or new medications (including over-the-counter products) are added. *  Please carry medication information at all times in case of emergency situations. These are general instructions for a healthy lifestyle:    No smoking/ No tobacco products/ Avoid exposure to second hand smoke    Surgeon General's Warning:  Quitting smoking now greatly reduces serious risk to your health. Obesity, smoking, and sedentary lifestyle greatly increases your risk for illness    A healthy diet, regular physical exercise & weight monitoring are important for maintaining a healthy lifestyle    You may be retaining fluid if you have a history of heart failure or if you experience any of the following symptoms:  Weight gain of 3 pounds or more overnight or 5 pounds in a week, increased swelling in our hands or feet or shortness of breath while lying flat in bed.   Please call your doctor as soon as you notice any of these symptoms; do not wait until your next office visit. Recognize signs and symptoms of STROKE:    F-face looks uneven    A-arms unable to move or move unevenly    S-speech slurred or non-existent    T-time-call 911 as soon as signs and symptoms begin-DO NOT go       Back to bed or wait to see if you get better-TIME IS BRAIN.

## 2017-05-31 ENCOUNTER — OFFICE VISIT (OUTPATIENT)
Dept: ORTHOPEDIC SURGERY | Age: 67
End: 2017-05-31

## 2017-05-31 VITALS
HEART RATE: 88 BPM | SYSTOLIC BLOOD PRESSURE: 129 MMHG | TEMPERATURE: 98.1 F | HEIGHT: 69 IN | BODY MASS INDEX: 27.55 KG/M2 | WEIGHT: 186 LBS | OXYGEN SATURATION: 96 % | DIASTOLIC BLOOD PRESSURE: 57 MMHG | RESPIRATION RATE: 16 BRPM

## 2017-05-31 DIAGNOSIS — G57.92 NEURITIS OF LEFT LOWER EXTREMITY: ICD-10-CM

## 2017-05-31 DIAGNOSIS — M47.816 LUMBAR FACET ARTHROPATHY: ICD-10-CM

## 2017-05-31 DIAGNOSIS — M51.26 HNP (HERNIATED NUCLEUS PULPOSUS), LUMBAR: Primary | ICD-10-CM

## 2017-05-31 NOTE — PATIENT INSTRUCTIONS
Low Back Arthritis: Exercises  Your Care Instructions  Here are some examples of typical rehabilitation exercises for your condition. Start each exercise slowly. Ease off the exercise if you start to have pain. Your doctor or physical therapist will tell you when you can start these exercises and which ones will work best for you. When you are not being active, find a comfortable position for rest. Some people are comfortable on the floor or a medium-firm bed with a small pillow under their head and another under their knees. Some people prefer to lie on their side with a pillow between their knees. Don't stay in one position for too long. Take short walks (10 to 20 minutes) every 2 to 3 hours. Avoid slopes, hills, and stairs until you feel better. Walk only distances you can manage without pain, especially leg pain. How to do the exercises  Pelvic tilt    1. Lie on your back with your knees bent. 2. \"Brace\" your stomachtighten your muscles by pulling in and imagining your belly button moving toward your spine. 3. Press your lower back into the floor. You should feel your hips and pelvis rock back. 4. Hold for 6 seconds while breathing smoothly. 5. Relax and allow your pelvis and hips to rock forward. 6. Repeat 8 to 12 times. Back stretches    1. Get down on your hands and knees on the floor. 2. Relax your head and allow it to droop. Round your back up toward the ceiling until you feel a nice stretch in your upper, middle, and lower back. Hold this stretch for as long as it feels comfortable, or about 15 to 30 seconds. 3. Return to the starting position with a flat back while you are on your hands and knees. 4. Let your back sway by pressing your stomach toward the floor. Lift your buttocks toward the ceiling. 5. Hold this position for 15 to 30 seconds. 6. Repeat 2 to 4 times. Follow-up care is a key part of your treatment and safety.  Be sure to make and go to all appointments, and call your doctor if you are having problems. It's also a good idea to know your test results and keep a list of the medicines you take. Where can you learn more? Go to http://candice-abby.info/. Enter A064 in the search box to learn more about \"Low Back Arthritis: Exercises. \"  Current as of: May 23, 2016  Content Version: 11.2  © 3225-0333 [employer]. Care instructions adapted under license by [employer] (which disclaims liability or warranty for this information). If you have questions about a medical condition or this instruction, always ask your healthcare professional. [place] [address] any warranty or liability for your use of this information.

## 2017-05-31 NOTE — LETTER
[place] Request Form for [facility] Scheduling - Leticia Baca Fax to [phone]  Telephone: [phone] To be completed by Physician Office: 
 
Date: 2017    Requested by: Sohan Hernandez Phone No: [phone]   Fax No:  [phone] Surgery Date: 2017  Arrival Time: 12:45   Injection Time: 2:15 Provider: Nain Alvarez      
 
CPT CODE*  Procedure 21016 SNRB LEFT L3  
   
   
*CPT code is required for ALL procedures. Patient Information: 
 
Name: Gilmar Fischer SSN: [id number]  : 1950   Male/Female: female Home Phone No: [phone] (home) Primary Insurance: [address] [place] [address] Number: [id number] 
NO AUTH REQUIRED  
 
 
 ICD10 code(s): M51.26; G57.92    Diagnosis:  HNP, LUMBAR; NEURITIS OF LEFT LOWER EXTREMITY Diabetic/finger stick to be done BS level must be <200 mg/dl Anesthesia Type Local: Valium 10 mg PO 30 minutes prior to procedure

## 2017-05-31 NOTE — MR AVS SNAPSHOT
Visit Information Date & Time Provider Department Dept. Phone Encounter #  
 5/31/2017 11:30 AM Valerie Oneal [facility] [phone] [id number] Follow-up Instructions Return in about 1 month (around 6/30/2017) for Injection follow up. Your Appointments 7/12/2017 11:00 AM  
Follow Up with Velma Chin MD  
[facility]) Appt Note: 1 MO NILA NAYLOR; KIMBERLY 6/7/17  
 [address] [facility] [address]  
[phone]  
  
   
 [address] [facility] [address] Upcoming Health Maintenance Date Due Hepatitis C Screening 1950 DTaP/Tdap/Td series (1 - Tdap) 9/4/1969 BREAST CANCER SCRN MAMMOGRAM 9/4/1998 FOBT Q 1 YEAR AGE 50-75 9/4/1998 ZOSTER VACCINE AGE 60> 9/4/2008 GLAUCOMA SCREENING Q2Y 9/4/2013 OSTEOPOROSIS SCREENING (DEXA) 9/4/2013 Pneumococcal 65+ Low/Medium Risk (1 of 2 - PCV13) 9/4/2013 MEDICARE YEARLY EXAM 9/4/2013 INFLUENZA AGE 9 TO ADULT 8/1/2017 Allergies as of 5/31/2017  Review Complete On: 5/31/2017 By: Velma Chin MD  
 No Known Allergies Current Immunizations  Never Reviewed No immunizations on file. Not reviewed this visit You Were Diagnosed With   
  
 Codes Comments HNP (herniated nucleus pulposus), lumbar    -  Primary ICD-10-CM: M51.26 
ICD-9-CM: 722.10 Neuritis of left lower extremity     ICD-10-CM: G57.92 
ICD-9-CM: 355.8 Lumbar facet arthropathy (HCC)     ICD-10-CM: M12.88 ICD-9-CM: 721.3 Vitals BP Pulse Temp Resp Height(growth percentile) Weight(growth percentile) 129/57 88 98.1 °F (36.7 °C) (Oral) 16 5' 8.5\" (1.74 m) 186 lb (84.4 kg) SpO2 BMI OB Status Smoking Status 96% 27.87 kg/m2 Perimenopausal Never Smoker BMI and BSA Data  Body Mass Index Body Surface Area  
 27.87 kg/m 2 2.02 m 2  
  
  
 Preferred Pharmacy Pharmacy Name Phone [phone] Your Updated Medication List  
  
   
This list is accurate as of: 5/31/17 12:21 PM.  Always use your most recent med list. ADVIL 200 mg tablet Generic drug:  ibuprofen Take  by mouth. FISH  mg Cap Generic drug:  Omega-3 Fatty Acids Take  by mouth.  
  
 gabapentin 300 mg capsule Commonly known as:  NEURONTIN Take 300 mg by mouth nightly. multivitamin tablet Commonly known as:  ONE A DAY Take 1 Tab by mouth daily. pramipexole 0.5 mg tablet Commonly known as:  MIRAPEX  
  
 pravastatin 80 mg tablet Commonly known as:  PRAVACHOL  
  
 predniSONE 5 mg tablet Commonly known as:  Carine Sake Take 2.5 mg by mouth every other day. sertraline 50 mg tablet Commonly known as:  ZOLOFT  
  
 traMADol 50 mg tablet Commonly known as:  ULTRAM  
  
 VITAMIN D3 2,000 unit Tab Generic drug:  cholecalciferol (vitamin D3) Take  by mouth. We Performed the Following SCHEDULE SURGERY [QWE0911 Custom] Follow-up Instructions Return in about 1 month (around 6/30/2017) for Injection follow up. Patient Instructions Low Back Arthritis: Exercises Your Care Instructions Here are some examples of typical rehabilitation exercises for your condition. Start each exercise slowly. Ease off the exercise if you start to have pain. Your doctor or physical therapist will tell you when you can start these exercises and which ones will work best for you. When you are not being active, find a comfortable position for rest. Some people are comfortable on the floor or a medium-firm bed with a small pillow under their head and another under their knees. Some people prefer to lie on their side with a pillow between their knees. Don't stay in one position for too long. Take short walks (10 to 20 minutes) every 2 to 3 hours. Avoid slopes, hills, and stairs until you feel better. Walk only distances you can manage without pain, especially leg pain. How to do the exercises Pelvic tilt 1. Lie on your back with your knees bent. 2. \"Brace\" your stomachtighten your muscles by pulling in and imagining your belly button moving toward your spine. 3. Press your lower back into the floor. You should feel your hips and pelvis rock back. 4. Hold for 6 seconds while breathing smoothly. 5. Relax and allow your pelvis and hips to rock forward. 6. Repeat 8 to 12 times. Back stretches 1. Get down on your hands and knees on the floor. 2. Relax your head and allow it to droop. Round your back up toward the ceiling until you feel a nice stretch in your upper, middle, and lower back. Hold this stretch for as long as it feels comfortable, or about 15 to 30 seconds. 3. Return to the starting position with a flat back while you are on your hands and knees. 4. Let your back sway by pressing your stomach toward the floor. Lift your buttocks toward the ceiling. 5. Hold this position for 15 to 30 seconds. 6. Repeat 2 to 4 times. Follow-up care is a key part of your treatment and safety. Be sure to make and go to all appointments, and call your doctor if you are having problems. It's also a good idea to know your test results and keep a list of the medicines you take. Where can you learn more? Go to http://candice-abby.info/. Enter Z782 in the search box to learn more about \"Low Back Arthritis: Exercises. \" Current as of: May 23, 2016 Content Version: 11.2 © 6867-8518 [employer]. Care instructions adapted under license by [employer] (which disclaims liability or warranty for this information).  If you have questions about a medical condition or this instruction, always ask your healthcare professional. [employer] disclaims any warranty or liability for your use of this information. Introducing [facility]! Dear Radha Sandoval: Thank you for requesting a Billtrust account. Our records indicate that you already have an active Billtrust account. You can access your account anytime at [email or web address] Document Preparation. [employer] Document Preparation Did you know that you can access your hospital and ER discharge instructions at any time in Billtrust? You can also review all of your test results from your hospital stay or ER visit. Additional Information If you have questions, please visit the Frequently Asked Questions section of the Billtrust website at [email or web address]/. Remember, Billtrust is NOT to be used for urgent needs. For medical emergencies, dial 911. Now available from your iPhone and Android! Please provide this summary of care documentation to your next provider. Your primary care clinician is listed as Gigi Hernandez. If you have any questions after today's visit, please call [phone].

## 2017-05-31 NOTE — PROGRESS NOTES
[facility]  [address] [place], [facility] [address]  Phone: [phone]  Fax: [phone]      ASSESSMENT   Bailey Carmona was seen today for back pain. Diagnoses and all orders for this visit:    HNP (herniated nucleus pulposus), lumbar  -     SCHEDULE SURGERY    Neuritis of left lower extremity  -     SCHEDULE SURGERY    Lumbar facet arthropathy ([facility] [address]         IMPRESSION AND PLAN:  Mahad Raymundo is a 74 y.o. female with history of cervical and lumbar pain. She tried a left L3 and left L4 SNRB on 05/03/2017 and experienced relief for about 1.5 weeks. Pt c/o intermittent sharp pain in the left buttock that radiates down the left lower extremity to the knee x 6 months. 1) Pt was given information on lumbar arthritis exercises. 2) Discussed treatment options with the Pt, including steroid injections and surgical intervention. 3) She was scheduled for a left L3 SNRB. 4) I recommended the Pt try OTC lidocaine patches and Neuropathy oil. 5) I instructed the Pt take Neurontin 300 mg 1-2 tabs QHS as tolerated at dinner time to address her drowsiness. 6) Ms. Fern Gaucher has a reminder for a \"due or due soon\" health maintenance. I have asked that she contact her primary care provider, Alina Parada DO, for follow-up on this health maintenance. 7)  demonstrated consistency with prescribing. 8) Pt will follow-up in 1 month. HISTORY OF PRESENT ILLNESS:  Mahad Raymundo is a 74 y.o. female with history of cervical and lumbar pain. She tried a left L3 and left L4 SNRB on 05/03/2017 and experienced relief for about 1.5 weeks. Pt c/o intermittent sharp pain in the left buttock that radiates down the left lower extremity to the knee x 6 months. She experiences pain when sitting. Pt has completed physical therapy. She takes Neurontin 300 mg 1 tab QHS and admits to sedation when waking. Pt at this time desires to proceed with a steroid injection.     Of note, Pt is not diabetic. Pain Scale: 5/10    PCP: Benito Cabot, DO       Past Medical History:   Diagnosis Date    Chronic pain         Social History     Social History    Marital status:      Spouse name: N/A    Number of children: N/A    Years of education: N/A     Occupational History    Not on file. Social History Main Topics    Smoking status: Never Smoker    Smokeless tobacco: Never Used    Alcohol use No    Drug use: No    Sexual activity: No     Other Topics Concern    Not on file     Social History Narrative       Current Outpatient Prescriptions   Medication Sig Dispense Refill    gabapentin (NEURONTIN) 300 mg capsule Take 300 mg by mouth nightly.  pramipexole (MIRAPEX) 0.5 mg tablet       pravastatin (PRAVACHOL) 80 mg tablet       predniSONE (DELTASONE) 5 mg tablet Take 2.5 mg by mouth every other day.  sertraline (ZOLOFT) 50 mg tablet       traMADol (ULTRAM) 50 mg tablet       ibuprofen (ADVIL) 200 mg tablet Take  by mouth.  multivitamin (ONE A DAY) tablet Take 1 Tab by mouth daily.  cholecalciferol, vitamin D3, (VITAMIN D3) 2,000 unit tab Take  by mouth.  Omega-3 Fatty Acids (FISH OIL) 500 mg cap Take  by mouth. No Known Allergies      REVIEW OF SYSTEMS    Constitutional: Negative for fever, chills, or weight change. Respiratory: Negative for cough or shortness of breath. Cardiovascular: Negative for chest pain or palpitations. Gastrointestinal: Negative for acid reflux, change in bowel habits, or constipation. Genitourinary: Negative for dysuria and flank pain. Musculoskeletal: Positive for lumbar pain. Skin: Negative for rash. Neurological: Negative for headaches, dizziness; positive for numbness left thigh (front). Endo/Heme/Allergies: Negative for increased bruising. Psychiatric/Behavioral: Negative for difficulty with sleep.       PHYSICAL EXAMINATION  Visit Vitals    /57    Pulse 88    Temp 98.1 °F (36.7 °C) (Oral)    Resp 16    Ht 5' 8.5\" (1.74 m)    Wt 186 lb (84.4 kg)    SpO2 96%    BMI 27.87 kg/m2       Constitutional: Awake, alert, and in no acute distress  Neurological: 1+ symmetrical DTRs in the upper extremities. 1+ symmetrical DTRs in the lower extremities. Sensation to light touch is intact. Negative Carmine's sign bilaterally. Skin: warm, dry, and intact. Musculoskeletal: Tenderness to palpation in the lower lumbar region. Pain with extension and axial loading. No different with forward flexion and lateral bending. No pain with internal or external rotation of her hips. Negative straight leg raise bilaterally. Biceps  Triceps Deltoids Wrist Ext Wrist Flex Hand Intrin   Right +4/5 +4/5 +4/5 +4/5 +4/5 +4/5   Left +4/5 +4/5 +4/5 +4/5 +4/5 +4/5      Hip Flex  Quads Hamstrings Ankle DF EHL Ankle PF   Right +4/5 +4/5 +4/5 +4/5 +4/5 +4/5   Left 4/5 +4/5 +4/5 +4/5 +4/5 +4/5     IMAGING:    Lumbar spine MRI from 04/20/2017 was personally reviewed with the Pt and demonstrated:  IMPRESSION:  Levoscoliosis. L3-4: Moderate central stenosis. Moderate posterior annular bulge. Grade 1 anterior degenerative listhesis. No evidence of advanced foraminal narrowing, lateral recess compromise, or advanced facet arthropathy.      Cervical spine MRI from 04/22/2014 was personally reviewed with the Pt and demonstrated:     Results from Hospital Encounter on 04/22/14   MRI CERV SPINE WO CONT    Narrative EXAM: MRI CERV SPINE WO CONT    INDICATION: Neck pain    COMPARISON: None. TECHNIQUE: MR imaging of the cervical spine was performed including sagittal  T1, T2, STIR; axial GRE, T2, T1. Contrast was not administered. FINDINGS:  Evidence of prior surgery with partial osseous fusion of C5-C6 and C6-C7. Please going with surgical history. Reversal of the cervical lordosis with apex  at C5-C6. Focal kyphosis and partial osseous fusion of C5-C6 with anterior  wedging of C6 vertebral body.  Remainder vertebral bodies maintain normal  height. Slight anterior listhesis of C3 on C4. Mild degenerative discogenic  disease C3-C4, C4-C5 and C7-T1. The craniocervical junction is intact. The  course, caliber, and signal intensity of the spinal cord are normal.    C2/3: Mild disc osteophyte complex. Marked left and mild right facet  arthropathy. Moderate to severe left foraminal stenosis. There is posterior  ligamentous hypertrophy. Mild to moderate narrowing of the central canal, AP  diameter is 7.7 mm.    C3/4: Marked left and mild right facet hypertrophy. 8mm cystic structure just  lateral to the left facet joint image 18 axial T2 may represent facet synovial  cyst. Severe left foraminal stenosis. There is posterior ligamentous  hypertrophy. Moderate narrowing of the central canal, AP diameter is 7.1 mm. Dorsal and ventral CSF are effaced. C4/5: Disc osteophyte complex. Moderate to severe left facet hypertrophy. Moderate left foraminal stenosis. Mild/moderate central canal stenosis, AP  diameter is 8 mm. C5/6: Central canal and neural foramen are patent. C6/7: Central canal and neural foramen are patent. C7/T1: Central canal and neural foramen are patent.           Impression IMPRESSION:  1. Disc osteophyte complex and marked left facet arthropathy C2-C3 through  C4-C5 resulting in significant left foraminal stenosis and mild to moderate  central canal stenosis. 2. Postsurgical changes, detailed above. Please correlate with surgical history. Thank you for this referral.        Written by Tono Fierro, as dictated by Eligio Lima MD.  I, Dr. Eligio Lima confirm that all documentation is accurate.

## 2017-06-07 ENCOUNTER — HOSPITAL ENCOUNTER (OUTPATIENT)
Age: 67
Setting detail: OUTPATIENT SURGERY
Discharge: HOME | End: 2017-06-07
Attending: PHYSICAL MEDICINE & REHABILITATION | Admitting: PHYSICAL MEDICINE & REHABILITATION
Payer: MEDICARE

## 2017-06-07 ENCOUNTER — APPOINTMENT (OUTPATIENT)
Dept: GENERAL RADIOLOGY | Age: 67
End: 2017-06-07
Attending: PHYSICAL MEDICINE & REHABILITATION
Payer: MEDICARE

## 2017-06-07 VITALS
BODY MASS INDEX: 27.55 KG/M2 | OXYGEN SATURATION: 98 % | TEMPERATURE: 97.8 F | RESPIRATION RATE: 20 BRPM | HEIGHT: 69 IN | DIASTOLIC BLOOD PRESSURE: 74 MMHG | WEIGHT: 186 LBS | HEART RATE: 76 BPM | SYSTOLIC BLOOD PRESSURE: 135 MMHG

## 2017-06-07 PROCEDURE — 76010000009 HC PAIN MGT 0 TO 30 MIN PROC: Performed by: PHYSICAL MEDICINE & REHABILITATION

## 2017-06-07 PROCEDURE — 74011636320 HC RX REV CODE- 636/320

## 2017-06-07 PROCEDURE — 74011250636 HC RX REV CODE- 250/636

## 2017-06-07 PROCEDURE — 77030003669 HC NDL SPN COOK -B: Performed by: PHYSICAL MEDICINE & REHABILITATION

## 2017-06-07 PROCEDURE — 74011000250 HC RX REV CODE- 250

## 2017-06-07 RX ORDER — DIAZEPAM 5 MG/1
5-20 TABLET ORAL ONCE
Status: DISCONTINUED | OUTPATIENT
Start: 2017-06-07 | End: 2017-06-07 | Stop reason: HOSPADM

## 2017-06-07 RX ORDER — SODIUM CHLORIDE 0.9 % (FLUSH) 0.9 %
5-10 SYRINGE (ML) INJECTION AS NEEDED
Status: DISCONTINUED | OUTPATIENT
Start: 2017-06-07 | End: 2017-06-07 | Stop reason: HOSPADM

## 2017-06-07 RX ORDER — DEXAMETHASONE SODIUM PHOSPHATE 100 MG/10ML
INJECTION INTRAMUSCULAR; INTRAVENOUS AS NEEDED
Status: DISCONTINUED | OUTPATIENT
Start: 2017-06-07 | End: 2017-06-07 | Stop reason: HOSPADM

## 2017-06-07 RX ORDER — LIDOCAINE HYDROCHLORIDE 10 MG/ML
INJECTION, SOLUTION EPIDURAL; INFILTRATION; INTRACAUDAL; PERINEURAL AS NEEDED
Status: DISCONTINUED | OUTPATIENT
Start: 2017-06-07 | End: 2017-06-07 | Stop reason: HOSPADM

## 2017-06-07 NOTE — H&P
Date of Surgery Update:  Nany Mc was seen and examined. History and physical has been reviewed. The patient has been examined. There have been no significant clinical changes since the completion of the last office visit.       Signed By: Leopoldo Dally, MD     June 7, 2017 2:11 PM

## 2017-06-07 NOTE — PROCEDURES
PROCEDURE NOTE      Patient Name: Tuan Flood    Date of Procedure: June 7, 2017    Preoperative Diagnosis:  Bulge of lumbar disc without myelopathy [M51.26]  Compression neuropathy of left lower extremity [G57.92]    Post Operative Diagnosis:  Bulge of lumbar disc without myelopathy [M51.26]  Compression neuropathy of left lower extremity [G57.92]    Procedure :    left L3 Selective Nerve Root Block    Consent:  Informed consent was obtained prior to the procedure. The patient was given the opportunity to ask questions regarding the procedure and its associated risks. In addition to the potential risks associated with the procedure itself, the patient was informed both verbally and in writing of the potential side effects of the use of glucocorticoid. The patient appeared to comprehend the informed consent and desired to have the procedure performed. Procedure: The patient was placed in the prone position on the fluoroscopy table and the back was prepped and draped in the usual sterile manner. The exact spinal level was  identified using fluoroscopy, and Lidocaine 1 % was injected locally, a # 22 gauge spinal needle was passed to the transverse process. The depth was noted and the needle redirected to pass inferior and approximately one cm anterior to the transverse process. YES  1 cc of Isovue M-200 was used to verify positioning in the epidural and paravertebral space and outlined the course of the spinal nerve into the epidural space. The same procedure was repeated at each spinal level indicated above. A total of 30 mg of preservative free Dexamethasone and 5 cc of Lidocaine was slowly injected. The patient tolerated the procedure well. The injection area was cleaned and bandaids applied. Not excessive bleeding was noted. Patient dressed and discharged to home with instructions. Discussion: The patient tolerated the procedure well. Fabi Kimball MD  June 7, 2017

## 2017-06-07 NOTE — DISCHARGE INSTRUCTIONS
[facility]   ([facility])  Dr. Mary Ann Bull, Dr. Asim Tan, Dr. Rani Cerda not drive a car, operate heavy machinery or dangerous equipment for 24 hours. * Activity as tolerated; rest for the remainder of the day. * Resume pre-block medications including those for your family doctor. * Do not drink alcoholic beverages for 24 hours. Alcohol and the medications you have received may interact and cause an adverse reaction. * You may feel better this evening and worse tomorrow, as the numbing medications wears off and the steroid has yet to begin to work. After 48 hrs the steroid should begin to release bringing you relief. * You may shower this evening and remove any bandages. * Avoid hot tubs and heating pads for 24 hours. You may use cold packs on the procedure site as tolerated for the first 24 hours. * If a headache develops, drink plenty of fluids and rest.  Take over the counter medications for headache if needed. If the headache continues longer than 24 hours, call MD at the [address] [place] [address]. [phone]    * Continue taking pain medications as needed. * You may resume your regular diet if tolerated. Otherwise, start with sips of water and advance slowly. * If Diabetic: check your blood sugar three times a day for the next 3 days. If your sugar is greater than 300 call your family doctor. If your sugar is greater than 400, have someone transport you to the nearest Emergency Room. * If you experience any of the following problems, Please Call the [address] [place] [address] at [phone].         * Shortness of Breath    * Fever of 101 or higher    * Nausea / Vomiting    * Severe Headache    * Weakness or numbness in arms or legs that is not      resolving    * Prolonged increase in pain greater than 4 days      DISCHARGE SUMMARY from Nurse      PATIENT INSTRUCTIONS:    After oral sedation, for 24 hours or while taking prescription Narcotics:  · Limit your activities  · Do not drive and operate hazardous machinery  · Do not make important personal or business decisions  · Do  not drink alcoholic beverages  · If you have not urinated within 8 hours after discharge, please contact your surgeon on call. Report the following to your surgeon:  · Excessive pain, swelling, redness or odor of or around the surgical area  · Temperature over 101  · Nausea and vomiting lasting longer than 4 hours or if unable to take medications  · Any signs of decreased circulation or nerve impairment to extremity: change in color, persistent  numbness, tingling, coldness or increase pain  · Any questions            What to do at Home:  Recommended activity: Activity as tolerated, NO DRIVING FOR 12 Hours post injection          *  Please give a list of your current medications to your Primary Care Provider. *  Please update this list whenever your medications are discontinued, doses are      changed, or new medications (including over-the-counter products) are added. *  Please carry medication information at all times in case of emergency situations. These are general instructions for a healthy lifestyle:    No smoking/ No tobacco products/ Avoid exposure to second hand smoke    Surgeon General's Warning:  Quitting smoking now greatly reduces serious risk to your health. Obesity, smoking, and sedentary lifestyle greatly increases your risk for illness    A healthy diet, regular physical exercise & weight monitoring are important for maintaining a healthy lifestyle    You may be retaining fluid if you have a history of heart failure or if you experience any of the following symptoms:  Weight gain of 3 pounds or more overnight or 5 pounds in a week, increased swelling in our hands or feet or shortness of breath while lying flat in bed.   Please call your doctor as soon as you notice any of these symptoms; do not wait until your next office visit. Recognize signs and symptoms of STROKE:    F-face looks uneven    A-arms unable to move or move unevenly    S-speech slurred or non-existent    T-time-call 911 as soon as signs and symptoms begin-DO NOT go       Back to bed or wait to see if you get better-TIME IS BRAIN. [employer] Activation    Thank you for requesting access to [employer]. Please follow the instructions below to securely access and download your online medical record. [employer] allows you to send messages to your doctor, view your test results, renew your prescriptions, schedule appointments, and more. How Do I Sign Up? 1. In your internet browser, go to [email or web address]  2. Click on the First Time User? Click Here link in the Sign In box. You will be redirect to the New Member Sign Up page. 3. Enter your [employer] Access Code exactly as it appears below. You will not need to use this code after youve completed the sign-up process. If you do not sign up before the expiration date, you must request a new code. [employer] Access Code: Activation code not generated  Current [employer] Status: Active (This is the date your [employer] access code will )    4. Enter the last four digits of your Social Security Number (xxxx) and Date of Birth (mm/dd/yyyy) as indicated and click Submit. You will be taken to the next sign-up page. 5. Create a [employer] ID. This will be your [employer] login ID and cannot be changed, so think of one that is secure and easy to remember. 6. Create a [employer] password. You can change your password at any time. 7. Enter your Password Reset Question and Answer. This can be used at a later time if you forget your password. 8. Enter your e-mail address. You will receive e-mail notification when new information is available in [address] 9. Click Sign Up. You can now view and download portions of your medical record.   10. Click the Download Summary menu link to download a portable copy of your medical information. Additional Information    If you have questions, please visit the Frequently Asked Questions section of the [email or web address] website at [email or web address] Remember, [email or web address] is NOT to be used for urgent needs. For medical emergencies, dial 911.

## 2017-07-12 ENCOUNTER — OFFICE VISIT (OUTPATIENT)
Dept: ORTHOPEDIC SURGERY | Age: 67
End: 2017-07-12

## 2017-07-12 VITALS
DIASTOLIC BLOOD PRESSURE: 84 MMHG | HEART RATE: 83 BPM | HEIGHT: 69 IN | SYSTOLIC BLOOD PRESSURE: 147 MMHG | WEIGHT: 186 LBS | BODY MASS INDEX: 27.55 KG/M2 | RESPIRATION RATE: 16 BRPM

## 2017-07-12 DIAGNOSIS — G57.92 NEURITIS OF LEFT LOWER EXTREMITY: ICD-10-CM

## 2017-07-12 DIAGNOSIS — M47.816 LUMBAR FACET ARTHROPATHY: ICD-10-CM

## 2017-07-12 DIAGNOSIS — M17.12 OSTEOARTHRITIS OF LEFT KNEE, UNSPECIFIED OSTEOARTHRITIS TYPE: ICD-10-CM

## 2017-07-12 DIAGNOSIS — M53.3 COCCYGODYNIA: ICD-10-CM

## 2017-07-12 DIAGNOSIS — M51.26 HNP (HERNIATED NUCLEUS PULPOSUS), LUMBAR: Primary | ICD-10-CM

## 2017-07-12 RX ORDER — DICLOFENAC SODIUM 10 MG/G
4 GEL TOPICAL 4 TIMES DAILY
Qty: 5 EACH | Refills: 1 | Status: SHIPPED | OUTPATIENT
Start: 2017-07-12 | End: 2021-05-12

## 2017-07-12 RX ORDER — GABAPENTIN 300 MG/1
CAPSULE ORAL
Qty: 60 CAP | Refills: 5 | Status: SHIPPED | OUTPATIENT
Start: 2017-07-12 | End: 2018-03-26 | Stop reason: SDUPTHER

## 2017-07-12 NOTE — PATIENT INSTRUCTIONS
Coccyx Pain: Care Instructions  Your Care Instructions  The coccyx is your tailbone. You can have pain in your tailbone from a fall or other injury. Pregnancy and childbirth also can cause tailbone pain. Sometimes, the cause of pain is not known. A tailbone injury causes pain when you sit, especially when you slump or sit on a hard seat. Straining to have a bowel movement also can be very painful. Tailbone injuries can take several months to heal, but in some cases the pain goes even longer. You can take steps at home to ease the pain. In some cases, a doctor injects a corticosteroid medicine into the coccyx to reduce swelling and pain. Follow-up care is a key part of your treatment and safety. Be sure to make and go to all appointments, and call your doctor if you are having problems. It's also a good idea to know your test results and keep a list of the medicines you take. How can you care for yourself at home? · Take pain medicines exactly as directed. ¨ If the doctor gave you a prescription medicine for pain, take it as prescribed. ¨ If you are not taking a prescription pain medicine, take an over-the-counter medicine to reduce pain. · Put ice or a cold pack on your tailbone for 10 to 20 minutes at a time. Try to do this every 1 to 2 hours for the next 3 days (when you are awake) or until the swelling goes down. Put a thin cloth between the ice and your skin. · About 2 or 3 days after your injury, you can alternate ice and heat. To soothe the tailbone area, take a warm bath for 20 minutes, 3 or 4 times a day. · Sit on soft, padded surfaces. A doughnut-shaped pillow can take pressure off the tailbone. · Avoid constipation, because straining to have a bowel movement will increase your tailbone pain. ¨ Include fruits, vegetables, beans, and whole grains in your diet each day. These foods are high in fiber. ¨ Drink plenty of fluids, enough so that your urine is light yellow or clear like water.  If you have kidney, heart, or liver disease and have to limit fluids, talk with your doctor before you increase the amount of fluids you drink. ¨ Get some exercise every day. Build up slowly to 30 to 60 minutes a day on 5 or more days of the week. ¨ Take a fiber supplement, such as Citrucel or Metamucil, every day if needed. Read and follow all instructions on the label. ¨ Schedule time each day for a bowel movement. A daily routine may help. Take your time and do not strain when having a bowel movement. · Follow your doctor's directions for stretching and other exercises that might help with pain. When should you call for help? Call 911 anytime you think you may need emergency care. For example, call if:  · You are unable to move a leg at all. Call your doctor now or seek immediate medical care if:  · You have new or worse symptoms in your legs or buttocks. Symptoms may include:  ¨ Numbness or tingling. ¨ Weakness. ¨ Pain. · You lose bladder or bowel control. Watch closely for changes in your health, and be sure to contact your doctor if:  · You are not getting better as expected. Where can you learn more? Go to http://candice-abby.info/. Enter U409 in the search box to learn more about \"Coccyx Pain: Care Instructions. \"  Current as of: March 20, 2017  Content Version: 11.3  © 0957-6443 [employer]. Care instructions adapted under license by [employer] (which disclaims liability or warranty for this information). If you have questions about a medical condition or this instruction, always ask your healthcare professional. [place] [address] any warranty or liability for your use of this information. Sacroiliac Pain: Exercises  Your Care Instructions  Here are some examples of typical rehabilitation exercises for your condition. Start each exercise slowly. Ease off the exercise if you start to have pain.   Your doctor or physical therapist will tell you when you can start these exercises and which ones will work best for you. How to do the exercises  Knee-to-chest stretch    Do not do the knee-to-chest exercise if it causes or increases back or leg pain. 1. Lie on your back with your knees bent and your feet flat on the floor. You can put a small pillow under your head and neck if it is more comfortable. 2. Grasp your hands under one knee and bring the knee to your chest, keeping the other foot flat on the floor. 3. Keep your lower back pressed to the floor. Hold for at least 15 to 30 seconds. 4. Relax and lower the knee to the starting position. Repeat with the other leg. 5. Repeat 2 to 4 times with each leg. 6. To get more stretch, keep your other leg flat on the floor while pulling your knee to your chest.  Bridging    1. Lie on your back with both knees bent. Your knees should be bent about 90 degrees. 2. Tighten your belly muscles by pulling in your belly button toward your spine. Then push your feet into the floor, squeeze your buttocks, and lift your hips off the floor until your shoulders, hips, and knees are all in a straight line. 3. Hold for about 6 seconds as you continue to breathe normally, and then slowly lower your hips back down to the floor and rest for up to 10 seconds. 4. Repeat 8 to 12 times. Hip extension    1. Get down on your hands and knees on the floor. 2. Keeping your back and neck straight, lift one leg straight out behind you. When you lift your leg, keep your hips level. Don't let your back twist, and don't let your hip drop toward the floor. 3. Hold for 6 seconds. Repeat 8 to 12 times with each leg. 4. If you feel steady and strong when you do this exercise, you can make it more difficult. To do this, when you lift your leg, also lift the opposite arm straight out in front of you. For example, lift the left leg and the right arm at the same time. (This is sometimes called the \"bird dog exercise. \") Hold for 6 seconds, and repeat 8 to 12 times on each side. Clamshell    1. Lie on your side with a pillow under your head. Keep your feet and knees together and your knees bent. 2. Raise your top knee, but keep your feet together. Do not let your hips roll back. Your legs should open up like a clamshell. 3. Hold for 6 seconds. 4. Slowly lower your knee back down. Rest for 10 seconds. 5. Repeat 8 to 12 times. 6. Switch to your other side and repeat steps 1 through 5. Hamstring wall stretch    1. Lie on your back in a doorway, with one leg through the open door. 2. Slide your affected leg up the wall to straighten your knee. You should feel a gentle stretch down the back of your leg. ¨ Do not arch your back. ¨ Do not bend either knee. ¨ Keep one heel touching the floor and the other heel touching the wall. Do not point your toes. 3. Hold the stretch for at least 1 minute to begin. Then try to lengthen the time you hold the stretch to as long as 6 minutes. 4. Switch legs, and repeat steps 1 through 3.  5. Repeat 2 to 4 times. If you do not have a place to do this exercise in a doorway, there is another way to do it:  1. Lie on your back, and bend one knee. 2. Loop a towel under the ball and toes of that foot, and hold the ends of the towel in your hands. 3. Straighten your knee, and slowly pull back on the towel. You should feel a gentle stretch down the back of your leg. 4. Switch legs, and repeat steps 1 through 3.  5. Repeat 2 to 4 times. Lower abdominal strengthening    1. Lie on your back with your knees bent and your feet flat on the floor. 2. Tighten your belly muscles by pulling your belly button in toward your spine. 3. Lift one foot off the floor and bring your knee toward your chest, so that your knee is straight above your hip and your leg is bent like the letter \"L. \"  4. Lift the other knee up to the same position. 5. Lower one leg at a time to the starting position.   6. Keep alternating legs until you have lifted each leg 8 to 12 times. 7. Be sure to keep your belly muscles tight and your back still as you are moving your legs. Be sure to breathe normally. Piriformis stretch    1. Lie on your back with your legs straight. 2. Lift your affected leg, and bend your knee. With your opposite hand, reach across your body, and then gently pull your knee toward your opposite shoulder. 3. Hold the stretch for 15 to 30 seconds. 4. Switch legs and repeat steps 1 through 3.  5. Repeat 2 to 4 times. Follow-up care is a key part of your treatment and safety. Be sure to make and go to all appointments, and call your doctor if you are having problems. It's also a good idea to know your test results and keep a list of the medicines you take. Where can you learn more? Go to http://candice-abby.info/. Enter U561 in the search box to learn more about \"Sacroiliac Pain: Exercises. \"  Current as of: March 21, 2017  Content Version: 11.3  © 7470-4997 [employer]. Care instructions adapted under license by [employer] (which disclaims liability or warranty for this information). If you have questions about a medical condition or this instruction, always ask your healthcare professional. [address] any warranty or liability for your use of this information.

## 2017-07-12 NOTE — PROGRESS NOTES
[facility]  [address] [place], [facility] [address]  Phone: [phone]  Fax: [phone]      ASSESSMENT   Kevin Serrano was seen today for back pain, buttocks pain and follow-up. Diagnoses and all orders for this visit:    HNP (herniated nucleus pulposus), lumbar  -     gabapentin (NEURONTIN) 300 mg capsule; 2 po q pm as directed  Indications: NEUROPATHIC PAIN, Restless Legs Syndrome    Neuritis of left lower extremity  -     gabapentin (NEURONTIN) 300 mg capsule; 2 po q pm as directed  Indications: NEUROPATHIC PAIN, Restless Legs Syndrome    Lumbar facet arthropathy (HCC)    Coccygodynia    Osteoarthritis of left knee, unspecified osteoarthritis type  -     diclofenac (VOLTAREN) 1 % gel; Apply 4 g to affected area four (4) times daily. Apply 4 gm to left knee qid for osteoarthritis         IMPRESSION AND PLAN:  Mariela Zazueta is a 74 y.o. female with history of lumbar pain. Pt c/o pain in the left buttock/ coccyx and pain radiating down the left leg with some numbness. Pt had her coccyx removed in 1985 and over the last few years she has experienced progressively worsening pain. 1) Pt was given information on coccyx pain and sacroiliac exercises. 2) She was prescribed a coccyx cushion. 3) Pt was prescribed Voltaren 1% gel. 4) She will increase her Neurontin 300 mg to 2 tabs QHS to better manage her restless leg symptoms. 5) Ms. Catherine Hoyt has a reminder for a \"due or due soon\" health maintenance. I have asked that she contact her primary care provider, Maryann Servin DO, for follow-up on this health maintenance. 6)  demonstrated consistency with prescribing. 7) Pt will follow-up as needed. HISTORY OF PRESENT ILLNESS:  Mariela Zazueta is a 74 y.o. female with history of lumbar pain. Pt c/o pain in the left buttock/ coccyx. Pt had her coccyx removed in 1985 and over the last few years she has experienced progressively worsening pain.  She reports pain when sitting and has tried sitting on a variety of cushions with minimal relief. Pt admits that she does experience some relief when sitting in her recliner. Pt tried a left L3 SNRB on 06/07/2017 with minimal improvement. She continues to experience pain radiating down the left leg with intermittent numbness. She takes prednisone 5 mg 1/2 tab every other day and is trying to wean off the medication. Pt admits to increased pain since decreasing her prednisone. She was prescribed Voltaren 1% gel with Dr. Raine Thomas last year and states that her insurance did not approve it. Pt c/o pain and arthritis in the left knee. She takes Neurontin 300 mg 1 tab QHS. Pt also takes Mirapex for restless legs but states that she continues to experience symptoms. Pt at this time desires to proceed with a coccyx cushion and medication evaluation. Pain Scale: /10    PCP: Cain Sewell DO       Past Medical History:   Diagnosis Date    Chronic pain         Social History     Social History    Marital status:      Spouse name: N/A    Number of children: N/A    Years of education: N/A     Occupational History    Not on file. Social History Main Topics    Smoking status: Never Smoker    Smokeless tobacco: Never Used    Alcohol use No    Drug use: No    Sexual activity: No     Other Topics Concern    Not on file     Social History Narrative       Current Outpatient Prescriptions   Medication Sig Dispense Refill    diclofenac (VOLTAREN) 1 % gel Apply 4 g to affected area four (4) times daily. Apply 4 gm to left knee qid for osteoarthritis 5 Each 1    gabapentin (NEURONTIN) 300 mg capsule 2 po q pm as directed  Indications: NEUROPATHIC PAIN, Restless Legs Syndrome 60 Cap 5    pramipexole (MIRAPEX) 0.5 mg tablet       pravastatin (PRAVACHOL) 80 mg tablet       predniSONE (DELTASONE) 5 mg tablet Take 2.5 mg by mouth every other day.       sertraline (ZOLOFT) 50 mg tablet       traMADol (ULTRAM) 50 mg tablet       ibuprofen (ADVIL) 200 mg tablet Take  by mouth.  multivitamin (ONE A DAY) tablet Take 1 Tab by mouth daily.  cholecalciferol, vitamin D3, (VITAMIN D3) 2,000 unit tab Take  by mouth.  Omega-3 Fatty Acids (FISH OIL) 500 mg cap Take  by mouth. No Known Allergies      REVIEW OF SYSTEMS    Constitutional: Negative for fever, chills, or weight change. Respiratory: Negative for cough or shortness of breath. Cardiovascular: Negative for chest pain or palpitations. Gastrointestinal: Negative for acid reflux, change in bowel habits, or constipation. Genitourinary: Negative for dysuria and flank pain. Musculoskeletal: Positive for lumbar pain. Skin: Negative for rash. Neurological: Positive for numbness in the left leg. Negative for headaches or dizziness. Endo/Heme/Allergies: Negative for increased bruising. Psychiatric/Behavioral: Negative for difficulty with sleep. PHYSICAL EXAMINATION  Visit Vitals    /84    Pulse 83    Resp 16    Ht 5' 8.5\" (1.74 m)    Wt 186 lb (84.4 kg)    BMI 27.87 kg/m2       Constitutional: Awake, alert, and in no acute distress  Neurological: 1+ symmetrical DTRs in the lower extremities. Sensation to light touch is intact. Skin: warm, dry, and intact. Musculoskeletal: Tenderness to palpation in the lower lumbar region. Tenderness to palpation over the left buttock coccyx region. No pain with extension, axial loading, or forward flexion. No pain with internal or external rotation of her hips. Negative straight leg raise bilaterally. Hip Flex  Quads Hamstrings Ankle DF EHL Ankle PF   Right +4/5 +4/5 +4/5 +4/5 +4/5 +4/5   Left +4/5 +4/5 +4/5 +4/5 +4/5 +4/5     IMAGING:    Lumbar spine MRI from 04/20/2017 was personally reviewed with the Pt and demonstrated:  IMPRESSION:  Levoscoliosis. L3-4: Moderate central stenosis. Moderate posterior annular bulge. Grade 1 anterior degenerative listhesis.    No evidence of advanced foraminal narrowing, lateral recess compromise, or advanced facet arthropathy.       Cervical spine MRI from 04/22/2014 was personally reviewed with the Pt and demonstrated:  Results from Hospital Encounter on 04/22/14   MRI CERV SPINE WO CONT     Narrative EXAM: MRI CERV SPINE WO CONT    INDICATION: Neck pain    COMPARISON: None. TECHNIQUE: MR imaging of the cervical spine was performed including sagittal  T1, T2, STIR; axial GRE, T2, T1. Contrast was not administered. FINDINGS:  Evidence of prior surgery with partial osseous fusion of C5-C6 and C6-C7. Please going with surgical history. Reversal of the cervical lordosis with apex  at C5-C6. Focal kyphosis and partial osseous fusion of C5-C6 with anterior  wedging of C6 vertebral body. Remainder vertebral bodies maintain normal  height. Slight anterior listhesis of C3 on C4. Mild degenerative discogenic  disease C3-C4, C4-C5 and C7-T1. The craniocervical junction is intact. The  course, caliber, and signal intensity of the spinal cord are normal.    C2/3: Mild disc osteophyte complex. Marked left and mild right facet  arthropathy. Moderate to severe left foraminal stenosis. There is posterior  ligamentous hypertrophy. Mild to moderate narrowing of the central canal, AP  diameter is 7.7 mm.    C3/4: Marked left and mild right facet hypertrophy. 8mm cystic structure just  lateral to the left facet joint image 18 axial T2 may represent facet synovial  cyst. Severe left foraminal stenosis. There is posterior ligamentous  hypertrophy. Moderate narrowing of the central canal, AP diameter is 7.1 mm. Dorsal and ventral CSF are effaced. C4/5: Disc osteophyte complex. Moderate to severe left facet hypertrophy. Moderate left foraminal stenosis. Mild/moderate central canal stenosis, AP  diameter is 8 mm. C5/6: Central canal and neural foramen are patent. C6/7: Central canal and neural foramen are patent.     C7/T1: Central canal and neural foramen are patent.              Impression IMPRESSION:  1. Disc osteophyte complex and marked left facet arthropathy C2-C3 through  C4-C5 resulting in significant left foraminal stenosis and mild to moderate  central canal stenosis. 2. Postsurgical changes, detailed above. Please correlate with surgical history. Thank you for this referral.        Written by Triston Brown, as dictated by Ponce Aaron MD.  I, Dr. Ponce Aaron confirm that all documentation is accurate.

## 2018-03-26 DIAGNOSIS — G57.92 NEURITIS OF LEFT LOWER EXTREMITY: ICD-10-CM

## 2018-03-26 DIAGNOSIS — M51.26 HNP (HERNIATED NUCLEUS PULPOSUS), LUMBAR: ICD-10-CM

## 2018-03-27 RX ORDER — GABAPENTIN 300 MG/1
CAPSULE ORAL
Qty: 60 CAP | Refills: 0 | Status: SHIPPED | OUTPATIENT
Start: 2018-03-27 | End: 2019-11-04 | Stop reason: SDUPTHER

## 2018-03-27 NOTE — TELEPHONE ENCOUNTER
Called patient to inform of below, verified , patient verbalized agreement/understanding. Per patient, she may return to Dr. Yfn Baker to get her refills of her gabapentin, as she sees Dr. Yfn Baker more frequently. No further action required at this time.

## 2018-03-27 NOTE — TELEPHONE ENCOUNTER
I will approve 1 month supply -- I haven't seen this patient in awhile. She may get further refills from her PCP Dr. Abraham Palafox.

## 2019-07-01 ENCOUNTER — ANESTHESIA EVENT (OUTPATIENT)
Dept: ENDOSCOPY | Age: 69
End: 2019-07-01
Payer: MEDICARE

## 2019-07-02 ENCOUNTER — HOSPITAL ENCOUNTER (OUTPATIENT)
Age: 69
Setting detail: OUTPATIENT SURGERY
Discharge: HOME | End: 2019-07-02
Attending: INTERNAL MEDICINE | Admitting: INTERNAL MEDICINE
Payer: MEDICARE

## 2019-07-02 ENCOUNTER — ANESTHESIA (OUTPATIENT)
Dept: ENDOSCOPY | Age: 69
End: 2019-07-02
Payer: MEDICARE

## 2019-07-02 VITALS
RESPIRATION RATE: 16 BRPM | HEIGHT: 68 IN | OXYGEN SATURATION: 98 % | TEMPERATURE: 98.2 F | WEIGHT: 180 LBS | DIASTOLIC BLOOD PRESSURE: 62 MMHG | SYSTOLIC BLOOD PRESSURE: 116 MMHG | HEART RATE: 81 BPM | BODY MASS INDEX: 27.28 KG/M2

## 2019-07-02 PROCEDURE — 76060000031 HC ANESTHESIA FIRST 0.5 HR: Performed by: INTERNAL MEDICINE

## 2019-07-02 PROCEDURE — 76040000019: Performed by: INTERNAL MEDICINE

## 2019-07-02 PROCEDURE — 77030037006 HC FCPS BIOP ENDOSC DISP OCOA -A: Performed by: INTERNAL MEDICINE

## 2019-07-02 PROCEDURE — 74011000250 HC RX REV CODE- 250

## 2019-07-02 PROCEDURE — 88305 TISSUE EXAM BY PATHOLOGIST: CPT

## 2019-07-02 PROCEDURE — 74011000250 HC RX REV CODE- 250: Performed by: NURSE ANESTHETIST, CERTIFIED REGISTERED

## 2019-07-02 PROCEDURE — 88342 IMHCHEM/IMCYTCHM 1ST ANTB: CPT

## 2019-07-02 PROCEDURE — 74011250636 HC RX REV CODE- 250/636

## 2019-07-02 PROCEDURE — 74011250636 HC RX REV CODE- 250/636: Performed by: NURSE ANESTHETIST, CERTIFIED REGISTERED

## 2019-07-02 RX ORDER — SODIUM CHLORIDE, SODIUM LACTATE, POTASSIUM CHLORIDE, CALCIUM CHLORIDE 600; 310; 30; 20 MG/100ML; MG/100ML; MG/100ML; MG/100ML
75 INJECTION, SOLUTION INTRAVENOUS CONTINUOUS
Status: DISCONTINUED | OUTPATIENT
Start: 2019-07-02 | End: 2019-07-02 | Stop reason: HOSPADM

## 2019-07-02 RX ORDER — PROPOFOL 10 MG/ML
INJECTION, EMULSION INTRAVENOUS AS NEEDED
Status: DISCONTINUED | OUTPATIENT
Start: 2019-07-02 | End: 2019-07-02 | Stop reason: HOSPADM

## 2019-07-02 RX ORDER — FLUMAZENIL 0.1 MG/ML
0.2 INJECTION INTRAVENOUS
Status: DISCONTINUED | OUTPATIENT
Start: 2019-07-02 | End: 2019-07-02 | Stop reason: HOSPADM

## 2019-07-02 RX ORDER — SODIUM CHLORIDE 0.9 % (FLUSH) 0.9 %
5-40 SYRINGE (ML) INJECTION EVERY 8 HOURS
Status: DISCONTINUED | OUTPATIENT
Start: 2019-07-02 | End: 2019-07-02 | Stop reason: HOSPADM

## 2019-07-02 RX ORDER — SODIUM CHLORIDE 0.9 % (FLUSH) 0.9 %
5-40 SYRINGE (ML) INJECTION AS NEEDED
Status: DISCONTINUED | OUTPATIENT
Start: 2019-07-02 | End: 2019-07-02 | Stop reason: HOSPADM

## 2019-07-02 RX ORDER — NALOXONE HYDROCHLORIDE 0.4 MG/ML
0.4 INJECTION, SOLUTION INTRAMUSCULAR; INTRAVENOUS; SUBCUTANEOUS
Status: DISCONTINUED | OUTPATIENT
Start: 2019-07-02 | End: 2019-07-02 | Stop reason: HOSPADM

## 2019-07-02 RX ORDER — LIDOCAINE HYDROCHLORIDE 20 MG/ML
INJECTION, SOLUTION EPIDURAL; INFILTRATION; INTRACAUDAL; PERINEURAL AS NEEDED
Status: DISCONTINUED | OUTPATIENT
Start: 2019-07-02 | End: 2019-07-02 | Stop reason: HOSPADM

## 2019-07-02 RX ORDER — DEXTROMETHORPHAN/PSEUDOEPHED 2.5-7.5/.8
1.2 DROPS ORAL
Status: DISCONTINUED | OUTPATIENT
Start: 2019-07-02 | End: 2019-07-02 | Stop reason: HOSPADM

## 2019-07-02 RX ORDER — GLYCOPYRROLATE 0.2 MG/ML
INJECTION INTRAMUSCULAR; INTRAVENOUS AS NEEDED
Status: DISCONTINUED | OUTPATIENT
Start: 2019-07-02 | End: 2019-07-02 | Stop reason: HOSPADM

## 2019-07-02 RX ORDER — FENTANYL CITRATE 50 UG/ML
INJECTION, SOLUTION INTRAMUSCULAR; INTRAVENOUS
Status: COMPLETED
Start: 2019-07-02 | End: 2019-07-02

## 2019-07-02 RX ORDER — SODIUM CHLORIDE, SODIUM LACTATE, POTASSIUM CHLORIDE, CALCIUM CHLORIDE 600; 310; 30; 20 MG/100ML; MG/100ML; MG/100ML; MG/100ML
INJECTION, SOLUTION INTRAVENOUS
Status: DISCONTINUED | OUTPATIENT
Start: 2019-07-02 | End: 2019-07-02 | Stop reason: HOSPADM

## 2019-07-02 RX ORDER — EPINEPHRINE 0.1 MG/ML
1 INJECTION INTRACARDIAC; INTRAVENOUS
Status: DISCONTINUED | OUTPATIENT
Start: 2019-07-02 | End: 2019-07-02 | Stop reason: HOSPADM

## 2019-07-02 RX ORDER — FENTANYL CITRATE 50 UG/ML
INJECTION, SOLUTION INTRAMUSCULAR; INTRAVENOUS AS NEEDED
Status: DISCONTINUED | OUTPATIENT
Start: 2019-07-02 | End: 2019-07-02 | Stop reason: HOSPADM

## 2019-07-02 RX ORDER — ATROPINE SULFATE 0.1 MG/ML
0.5 INJECTION INTRAVENOUS
Status: DISCONTINUED | OUTPATIENT
Start: 2019-07-02 | End: 2019-07-02 | Stop reason: HOSPADM

## 2019-07-02 RX ADMIN — SODIUM CHLORIDE, SODIUM LACTATE, POTASSIUM CHLORIDE, AND CALCIUM CHLORIDE 75 ML/HR: 600; 310; 30; 20 INJECTION, SOLUTION INTRAVENOUS at 14:07

## 2019-07-02 RX ADMIN — PROPOFOL 30 MG: 10 INJECTION, EMULSION INTRAVENOUS at 14:27

## 2019-07-02 RX ADMIN — FENTANYL CITRATE 25 MCG: 50 INJECTION, SOLUTION INTRAMUSCULAR; INTRAVENOUS at 14:19

## 2019-07-02 RX ADMIN — FAMOTIDINE 20 MG: 10 INJECTION INTRAVENOUS at 14:07

## 2019-07-02 RX ADMIN — PROPOFOL 80 MG: 10 INJECTION, EMULSION INTRAVENOUS at 14:19

## 2019-07-02 RX ADMIN — LIDOCAINE HYDROCHLORIDE 80 MG: 20 INJECTION, SOLUTION EPIDURAL; INFILTRATION; INTRACAUDAL; PERINEURAL at 14:18

## 2019-07-02 RX ADMIN — SODIUM CHLORIDE, SODIUM LACTATE, POTASSIUM CHLORIDE, CALCIUM CHLORIDE: 600; 310; 30; 20 INJECTION, SOLUTION INTRAVENOUS at 14:14

## 2019-07-02 RX ADMIN — GLYCOPYRROLATE 0.1 MG: 0.2 INJECTION INTRAMUSCULAR; INTRAVENOUS at 14:18
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
## 2019-07-02 NOTE — ANESTHESIA POSTPROCEDURE EVALUATION
Procedure(s):  UPPER ENDOSCOPY with Bx's  COLONOSCOPY. MAC    Anesthesia Post Evaluation      Multimodal analgesia: multimodal analgesia used between 6 hours prior to anesthesia start to PACU discharge  Patient location during evaluation: bedside  Patient participation: complete - patient participated  Level of consciousness: awake  Pain management: adequate  Airway patency: patent  Anesthetic complications: no  Cardiovascular status: stable  Respiratory status: acceptable  Hydration status: acceptable  Post anesthesia nausea and vomiting:  controlled      Vitals Value Taken Time   /53 7/2/2019  2:41 PM   Temp     Pulse 75 7/2/2019  2:47 PM   Resp 16 7/2/2019  2:47 PM   SpO2 98 % 7/2/2019  2:47 PM   Vitals shown include unvalidated device data.

## 2019-07-02 NOTE — PROCEDURES
[place]  [address] [facility], [address]      Brief Procedure Note    Nina Perez  1950  [id number]    Date of Procedure: 7/2/2019    Preoperative diagnosis: 792.1 - R19.5,  Occult blood in stools  280.8 - D50.8,  Anemia, iron deficiency  789.06 - R10.13,  Epigastric pain    Postoperative diagnosis:  Endo: Errosive Gastritis  [place]: Normal    Type of Anesthesia: MAC (monitered anesthesia care)    Description of Findings: same as post op dx    Procedure: Procedure(s):  UPPER ENDOSCOPY with Bx's  COLONOSCOPY    :  Dr. Kam Kennedy MD    Assistant(s): @[facility]@    Type of Anesthesia:MAC     EBL:None    Specimens:   ID Type Source Tests Collected by Time Destination   1 : Antrum Bx's Preservative Stomach, Antrum  Harriet Simmons MD 7/2/2019 1424 Pathology       Findings: See printed and scanned procedure note    Complications: None    Dr. Kam Kennedy MD  7/2/2019  2:35 PM

## 2019-07-02 NOTE — H&P
History and Physical reviewed; I have examined the patient and there are no pertinent changes. Zoya Pablo MD, MD   1:29 PM 7/2/2019  [facility], [address] [place] [address]  [email or web address] [facility]

## 2019-07-02 NOTE — DISCHARGE INSTRUCTIONS
Colonoscopy: What to Expect at [address] [place] [address]  After you have a colonoscopy, you will stay at the clinic for 1 to 2 hours until the medicines wear off. Then you can go home. But you will need to arrange for a ride. Your doctor will tell you when you can eat and do your other usual activities. Your doctor will talk to you about when you will need your next colonoscopy. Your doctor can help you decide how often you need to be checked. This will depend on the results of your test and your risk for colorectal cancer. After the test, you may be bloated or have gas pains. You may need to pass gas. If a biopsy was done or a polyp was removed, you may have streaks of blood in your stool (feces) for a few days. This care sheet gives you a general idea about how long it will take for you to recover. But each person recovers at a different pace. Follow the steps below to get better as quickly as possible. How can you care for yourself at home? Activity  · Rest when you feel tired. · You can do your normal activities when it feels okay to do so. Diet  · Follow your doctor's directions for eating. · Unless your doctor has told you not to, drink plenty of fluids. This helps to replace the fluids that were lost during the colon prep. · Do not drink alcohol. Medicines  · If polyps were removed or a biopsy was done during the test, your doctor may tell you not to take aspirin or other anti-inflammatory medicines for a few days. These include ibuprofen (Advil, Motrin) and naproxen (Aleve). Other instructions  · For your safety, do not drive or operate machinery until the medicine wears off and you can think clearly. Your doctor may tell you not to drive or operate machinery until the day after your test.  · Do not sign legal documents or make major decisions until the medicine wears off and you can think clearly. The anesthesia can make it hard for you to fully understand what you are agreeing to.   Follow-up care is a key part of your treatment and safety. Be sure to make and go to all appointments, and call your doctor if you are having problems. It's also a good idea to know your test results and keep a list of the medicines you take. When should you call for help? Call 911 anytime you think you may need emergency care. For example, call if:  · You passed out (lost consciousness). · You pass maroon or bloody stools. · You have severe belly pain. Call your doctor now or seek immediate medical care if:  · Your stools are black and tarlike. · Your stools have streaks of blood, but you did not have a biopsy or any polyps removed. · You have belly pain, or your belly is swollen and firm. · You vomit. · You have a fever. · You are very dizzy. Watch closely for changes in your health, and be sure to contact your doctor if you have any problems. Where can you learn more? Go to [employer]be  Enter E264 in the search box to learn more about \"Colonoscopy: What to Expect at Home. \"   © 7681-5968 Healthwise, Incorporated. Care instructions adapted under license by [facility] (which disclaims liability or warranty for this information). This care instruction is for use with your licensed healthcare professional. If you have questions about a medical condition or this instruction, always ask your healthcare professional. [place] [address] any warranty or liability for your use of this information. Content Version: 59.8.813445; Current as of: November 14, 2014      DISCHARGE SUMMARY from Nurse     POST-PROCEDURE INSTRUCTIONS:    Call your Physician if you:  ? Observe any excess bleeding. ? Develop a temperature over 100.5o F.  ? Experience abdominal, shoulder or chest pain. ? Notice any signs of decreased circulation or nerve impairment to an extremity such as a change in color, persistent numbness, tingling, coldness or increase in pain. ?  Vomit blood or you have nausea and vomiting lasting longer than 4 hours. ? Are unable to take medications. ? Are unable to urinate within 8 hours after discharge following general anesthesia or intravenous sedation. For the next 24 hours after receiving general anesthesia or intravenous sedation, or while taking prescription Narcotics, limit your activities:  ? Do NOT drive a motor vehicle, operate hazard machinery or power tools, or perform tasks that require coordination. The medication you received during your procedure may have some effect on your mental awareness. ? Do NOT make important personal or business decisions. The medication you received during your procedure may have some effect on your mental awareness. ? Do NOT drink alcoholic beverages. These drinks do not mix well with the medications that have been given to you. ? Upon discharge from the hospital, you must be accompanied by a responsible adult. ? Resume your diet as directed by your physician. ? Resume medications as your physician has prescribed. ? Please give a list of your current medications to your Primary Care Provider. ? Please update this list whenever your medications are discontinued, doses are changed, or new medications (including over-the-counter products) are added. ? Please carry medication information at all times in case of emergency situations. These are general instructions for a healthy lifestyle:    No smoking/ No tobacco products/ Avoid exposure to second hand smoke.  Surgeon General's Warning:  Quitting smoking now greatly reduces serious risk to your health. Obesity, smoking, and a sedentary lifestyle greatly increase your risk for illness.    A healthy diet, regular physical exercise & weight monitoring are important for maintaining a healthy lifestyle   You may be retaining fluid if you have a history of heart failure or if you experience any of the following symptoms:  Weight gain of 3 pounds or more overnight or 5 pounds in a week, increased swelling in our hands or feet or shortness of breath while lying flat in bed. Please call your doctor as soon as you notice any of these symptoms; do not wait until your next office visit. Recognize signs and symptoms of STROKE:  F  -  Face looks uneven  A  -  Arms unable to move or move unevenly  S  -  Speech slurred or non-existent  T  -  Time to call 911 - as soon as signs and symptoms begin - DO NOT go back to bed or wait to see If you get better - TIME IS BRAIN. Colorectal Screening   Colorectal cancer almost always develops from precancerous polyps (abnormal growths) in the colon or rectum. Screening tests can find precancerous polyps, so that they can be removed before they turn into cancer. Screening tests can also find colorectal cancer early, when treatment works best.  Carney Speak with your physician about when you should begin screening and how often you should be tested. Additional Information    If you have questions, please call [phone]. Remember, [employer] is NOT to be used for urgent needs. For medical emergencies, dial 911. Educational references and/or instructions provided during this visit included:    EGD  Upper GI Endoscopy: What to Expect at Home  Your Recovery  After you have an endoscopy, you will stay at the hospital or clinic for 1 to 2 hours. This will allow the medicine to wear off. You will be able to go home after your doctor or nurse checks to make sure you are not having any problems. You may have to stay overnight if you had treatment during the test. You may have a sore throat for a day or two after the test.  This care sheet gives you a general idea about what to expect after the test.  How can you care for yourself at home? Activity  · Rest as much as you need to after you go home.   · You should be able to go back to your usual activities the day after the test.  Diet  · Follow your doctor's directions for eating after the test.  · Drink plenty of fluids (unless your doctor has told you not to). Medications  · If you have a sore throat the day after the test, use an over-the-counter spray to numb your throat. Follow-up care is a key part of your treatment and safety. Be sure to make and go to all appointments, and call your doctor if you are having problems. It's also a good idea to know your test results and keep a list of the medicines you take. When should you call for help? Call 911 anytime you think you may need emergency care. For example, call if:    · You passed out (loses consciousness).     · You have trouble breathing.     · You pass maroon or bloody stools.    Call your doctor now or seek immediate medical care if:    · You have pain that does not get better after your take pain medicine.     · You have new or worse belly pain.     · You have blood in your stools.     · You are sick to your stomach and cannot keep fluids down.     · You have a fever.     · You cannot pass stools or gas.    Watch closely for changes in your health, and be sure to contact your doctor if:    · Your throat still hurts after a day or two.     · You do not get better as expected. Where can you learn more? Go to http://candice-abby.info/. Enter [phone] 046-568 in the search box to learn more about \"Upper GI Endoscopy: What to Expect at Home. \"  Current as of: March 27, 2018  Content Version: 11.9  © 2082-3685 [employer]. Care instructions adapted under license by [employer] (which disclaims liability or warranty for this information). If you have questions about a medical condition or this instruction, always ask your healthcare professional. [place] [address] any warranty or liability for your use of this information. Discharge information has been reviewed with the patient and spouse. The patient and spouse verbalized understanding.

## 2019-09-17 ENCOUNTER — HOSPITAL ENCOUNTER (OUTPATIENT)
Dept: PHYSICAL THERAPY | Age: 69
Discharge: HOME | End: 2019-09-17
Payer: MEDICARE

## 2019-09-17 PROCEDURE — 97110 THERAPEUTIC EXERCISES: CPT

## 2019-09-17 PROCEDURE — 97162 PT EVAL MOD COMPLEX 30 MIN: CPT

## 2019-09-17 PROCEDURE — 97140 MANUAL THERAPY 1/> REGIONS: CPT

## 2019-09-17 PROCEDURE — 97535 SELF CARE MNGMENT TRAINING: CPT

## 2019-09-17 NOTE — PROGRESS NOTES
[facility]  [address] [facility] [address]  [place], [address]  [phone] fax    Plan of Care/ Statement of Necessity for Physical Therapy Services    Patient name: Justine Li Start of Care: 2019   Referral source: Prem Clifford DO : 1950    Medical Diagnosis: Low back pain [M54.5]  Thoracic back pain [M54.6]  Payor: Judi Hung / Plan: [place] & B / Product Type: Medicare /  Onset Date:1 year    Treatment Diagnosis: LBP/SIJ dysfunction   Prior Hospitalization: see medical history Provider#: [id number]   Medications: Verified on Patient summary List    Comorbidities: scoliosis, cervical fusion with plate fixation, arthritis, rib removal right, coccyx removal   Prior Level of Function: Pt able to perform ADLs with less back pain and void of LE paresthesias      The Plan of Care and following information is based on the information from the initial evaluation. Assessment/ key information: Pt is a 67 y/o F presenting with c/o left sided LBP with left LE paresthesias. Pt reports about 1 year duration of back pain with 3 month or so duration of left LE paresthesias. Pt reports difficulty sitting for any length of time due to left hip pain. Also reports limited trunk mobility secondary to back pain with associated right trunk shift. She did have lumbar MRI in 2017 that showed L3-4 posterior bulge and mild stenosis. Pt demonstrates increased tone through left thoracolumbar paraspinals with associated left curvature, and TTP through musculature. She is limited with trunk extension and left SB secondary to lumbar pain. Left hip flexion in standing provokes left SI region pain. Pt reports constant numbness from left lateral thigh into 2-5 digits of left foot. No real change to LE paresthesias aside from increase with left s/l and mild decrease with left LE traction. Pt denies bowel/bladder issues, saddle paresthesias or noted weakness.  Myotomal strength appears WNL. Patient signs and symptoms appear consistent with mechanical lumbar pain and SI dysfunction. Associated pelvic obliquities and decreased left hip strength, TTP through left glutes that reproduces patient's pain reports with sitting. Pt would benefit from PT to improve lumbopelvic mechanics and segmental mobility with progression to core stability as tolerated to improve functional status. Evaluation Complexity History HIGH Complexity :3+ comorbidities / personal factors will impact the outcome/ POC ; Examination HIGH Complexity : 4+ Standardized tests and measures addressing body structure, function, activity limitation and / or participation in recreation  ;Presentation MEDIUM Complexity : Evolving with changing characteristics  ; Clinical Decision Making MEDIUM Complexity : FOTO score of 26-74  Overall Complexity Rating: MEDIUM  Problem List: pain affecting function, decrease ROM, decrease strength, decrease ADL/ functional abilitiies, decrease activity tolerance, decrease flexibility/ joint mobility and decrease transfer abilities   Treatment Plan may include any combination of the following: Therapeutic exercise, Therapeutic activities, Neuromuscular re-education, Physical agent/modality, Gait/balance training, Manual therapy, Patient education, Self Care training, Functional mobility training and Stair training  Patient / Family readiness to learn indicated by: asking questions and trying to perform skills  Persons(s) to be included in education: patient (P)  Barriers to Learning/Limitations: None  Patient Goal (s): Stay busy without pain  Patient Self Reported Health Status: good  Rehabilitation Potential: good    Short Term Goals: To be accomplished in 2-3 weeks:  1. Pt will be I and compliant with HEP to promote self management of symptoms. 2. Pt will demonstrate ability to attain neutral trunk posture without increase in back pain to improve mechanics with ADLs.   Long Term Goals: To be accomplished in 8 weeks:  1. Pt will demonstrate neutral pelvic alignment in frontal plane to improve lumbopelvic irritation with daily tasks. 2. Pt will improve left hip strength to 4/5 for improved stability with ADLs and reduced back pain. 3. Pt will report ability to sit for 1 hour without increase in pain to allow return to [facility] services. 4. Pt will improve FOTO score to 61 to demonstrate improved quality of life and function. Frequency / Duration: Patient to be seen 2 times per week for 8 weeks. Patient/ Caregiver education and instruction: Diagnosis, prognosis, self care, activity modification and exercises   [x]  Plan of care has been reviewed with PTA    Certification Period: 9/17/2019 to 1116/2019  Leita Holter DPT, CMTPT 9/17/2019 12:42 PM    ________________________________________________________________________    I certify that the above Therapy Services are being furnished while the patient is under my care. I agree with the treatment plan and certify that this therapy is necessary.     [address] [facility] Signature:____________________  Date:____________Time: _________    Please sign and return to [facility]  [address] [facility] [address]  [place], [address]  [phone] fax

## 2019-09-17 NOTE — PROGRESS NOTES
PT DAILY TREATMENT NOTE 10-18    Patient Name: Savanna Cotto  Date:2019  : 1950  [x]  Patient  Verified  Payor: Jasvir Rodriguez / Plan: [place] MEDICARE PART A & B / Product Type: Medicare /    In time:11:32  Out time:12:36  Total Treatment Time (min): 64  Visit #: 1 of 16    Medicare/BCBS Only   Total Timed Codes (min):  38 1:1 Treatment Time:  64       Treatment Area: Low back pain [M54.5]  Thoracic back pain [M54.6]    SUBJECTIVE  Pain Level (0-10 scale): 4  Any medication changes, allergies to medications, adverse drug reactions, diagnosis change, or new procedure performed?: [x] No    [] Yes (see summary sheet for update)  Subjective functional status/changes:   [] No changes reported  Pt reports constant left LE numbness from thigh to foot as well as back pain and SI pain when sitting    OBJECTIVE    26 min [x]Eval                  []Re-Eval       10 min Therapeutic Exercise:  [] See flow sheet :   Rationale: increase ROM, improve coordination and increase proprioception to improve the patients ability to perform ADLs     20 min Therapeutic Activity:  []  See flow sheet :   Rationale: pt education and self care instruction  to improve the patients ability to self manage symptoms and maximize therapeutic effect     8 min Manual Therapy:  Left posterior and right anterior innominate MET   Rationale: decrease pain and increase tissue extensibility to improve ease of ADL performance          With   [] TE   [] TA   [] neuro   [] other: Patient Education: [x] Review HEP    [] Progressed/Changed HEP based on:   [] positioning   [] body mechanics   [] transfers   [] heat/ice application    [] other:      Other Objective/Functional Measures:      Pain Level (0-10 scale) post treatment: 2    ASSESSMENT/Changes in Function: see POC    Patient will continue to benefit from skilled PT services to modify and progress therapeutic interventions, address functional mobility deficits, address ROM deficits, address strength deficits, analyze and address soft tissue restrictions, analyze and cue movement patterns, analyze and modify body mechanics/ergonomics and assess and modify postural abnormalities to attain remaining goals. []  See Plan of Care  []  See progress note/recertification  []  See Discharge Summary         Progress towards goals / Updated goals:  Short Term Goals: To be accomplished in 2-3 weeks:  1. Pt will be I and compliant with HEP to promote self management of symptoms. 2. Pt will demonstrate ability to attain neutral trunk posture without increase in back pain to improve mechanics with ADLs. Long Term Goals: To be accomplished in 8 weeks:  1. Pt will demonstrate neutral pelvic alignment in frontal plane to improve lumbopelvic irritation with daily tasks. 2. Pt will improve left hip strength to 4/5 for improved stability with ADLs and reduced back pain. 3. Pt will report ability to sit for 1 hour without increase in pain to allow return to [facility] services. 4. Pt will improve FOTO score to 61 to demonstrate improved quality of life and function.      PLAN  []  Upgrade activities as tolerated     []  Continue plan of care  []  Update interventions per flow sheet       []  Discharge due to:_  []  Other:_      Chadwick Opitz DPT, CMTPT 9/17/2019  1:55 PM    Future Appointments   Date Time Provider Micha Kaminski   9/19/2019 10:30 AM Shad Correa, PT [facility]   9/24/2019 11:00 AM Sven Kent, PTA [facility]   9/26/2019 10:30 AM Chu Blakely, PTA [facility]   10/1/2019 11:00 AM Sven Kent, MARIMAR [facility]   10/3/2019 11:00 AM Shad Correa, PT [facility]   10/8/2019 10:30 AM Chu Blakely, PTA [facility]   10/10/2019 11:00 AM Selena Simmonds [facility]

## 2019-09-19 ENCOUNTER — HOSPITAL ENCOUNTER (OUTPATIENT)
Dept: PHYSICAL THERAPY | Age: 69
Discharge: HOME | End: 2019-09-19
Payer: MEDICARE

## 2019-09-19 PROCEDURE — 97140 MANUAL THERAPY 1/> REGIONS: CPT

## 2019-09-19 PROCEDURE — 97110 THERAPEUTIC EXERCISES: CPT

## 2019-09-19 NOTE — PROGRESS NOTES
PT DAILY TREATMENT NOTE 10-18    Patient Name: Vivien Jenkins  Date:2019  : 1950  [x]  Patient  Verified  [place]or: [place] MED[place] / Plan: [place] MED[place] & B / Product Type: Medicare /    In time:10:32  Out time:11:19  Total Treatment Time (min): 37  Visit #: 2 of 16    Medicare/BCBS Only   Total Timed Codes (min):  37 1:1 Treatment Time:  37       Treatment Area: Low back pain [M54.5]  Thoracic back pain [M54.6]    SUBJECTIVE  Pain Level (0-10 scale): 7  Any medication changes, allergies to medications, adverse drug reactions, diagnosis change, or new procedure performed?: [x] No    [] Yes (see summary sheet for update)  Subjective functional status/changes:   [] No changes reported  Pt reports having trouble sleeping last night because of the pain. OBJECTIVE    22 min Therapeutic Exercise:  [x] See flow sheet :   Rationale: increase ROM and increase strength to improve the patients ability to tolerate ADLs    15 min Manual Therapy:  Pelvic alignment and leg length assessment, DTM/TPR/MFR left t/s paraspinals, grade 1-2 left SIJ mobs   Rationale: decrease pain, increase ROM, increase tissue extensibility and decrease trigger points to improve activity tolerance. With   [] TE   [] TA   [] neuro   [] other: Patient Education: [x] Review HEP    [] Progressed/Changed HEP based on:   [] positioning   [] body mechanics   [] transfers   [] heat/ice application    [] other:      Other Objective/Functional Measures: Initiated exercises/interventions in flow sheet. Pelvic alignment and leg length WNL today. Tenderness and tightness noted to the left t/s paraspinals and SIJ during manual interventions. Pain Level (0-10 scale) post treatment: 4    ASSESSMENT/Changes in Function: Reported improvement in pain post session. Pt asked about TENS unit and educated pt on importance of mobility to help with her symptoms secondary to pt's significant muscle tightness and mechanical limitations.  Limited tolerance to exercises today secondary to tightness and pain. Increased fear avoidance behavior noted with exercises today. Spasms and tightness reported with PPT in the left thoracic region. Educated pt on postural awareness throughout the day to improve tightness and pain. Continue POC as tolerated. Patient will continue to benefit from skilled PT services to modify and progress therapeutic interventions, address functional mobility deficits, address ROM deficits, address strength deficits, analyze and address soft tissue restrictions, analyze and cue movement patterns, analyze and modify body mechanics/ergonomics, assess and modify postural abnormalities and instruct in home and community integration to attain remaining goals. []  See Plan of Care  []  See progress note/recertification  []  See Discharge Summary         Progress towards goals / Updated goals:  Short Term Goals: To be accomplished in 2-3 weeks:  1. Pt will be I and compliant with HEP to promote self management of symptoms. Reports compliance 9/19/2019  2. Pt will demonstrate ability to attain neutral trunk posture without increase in back pain to improve mechanics with ADLs. Tends ot lean to the right 9/19/2019  Long Term Goals: To be accomplished in 8 weeks:  1. Pt will demonstrate neutral pelvic alignment in frontal plane to improve lumbopelvic irritation with daily tasks. WNL today 9/19/2019  2. Pt will improve left hip strength to 4/5 for improved stability with ADLs and reduced back pain. 3. Pt will report ability to sit for 1 hour without increase in pain to allow return to [employer] services. 4. Pt will improve FOTO score to 61 to demonstrate improved quality of life and function.     PLAN  [x]  Upgrade activities as tolerated     [x]  Continue plan of care  [x]  Update interventions per flow sheet       []  Discharge due to:_  []  Other:_      Parvin Hall, PT 9/19/2019  10:55 AM    Future Appointments   Date Time Provider Micha Kaminski   9/24/2019 11:00 AM Kobi Webb PTA [facility]   9/26/2019 10:30 AM Gorge Peña PTA [facility]   10/1/2019 11:00 AM Kobi Webb PTA [facility]   10/3/2019 11:00 AM Devonna Kayser, ANGELICA [facility]   10/8/2019 10:30 AM Gorge Peña PTA [facility]   10/10/2019 11:00 AM Jeaneth Pryor [facility]

## 2019-09-24 ENCOUNTER — APPOINTMENT (OUTPATIENT)
Dept: PHYSICAL THERAPY | Age: 69
End: 2019-09-24
Payer: MEDICARE

## 2019-09-26 ENCOUNTER — HOSPITAL ENCOUNTER (OUTPATIENT)
Dept: PHYSICAL THERAPY | Age: 69
Discharge: HOME | End: 2019-09-26
Payer: MEDICARE

## 2019-09-26 PROCEDURE — 97110 THERAPEUTIC EXERCISES: CPT

## 2019-09-26 PROCEDURE — 97140 MANUAL THERAPY 1/> REGIONS: CPT

## 2019-09-26 NOTE — PROGRESS NOTES
PT DAILY TREATMENT NOTE 10-18    Patient Name: Bentley Campbell  Date:2019  : 1950  [x]  Patient  Verified  Payor: Lucio Tilley / Plan: [place] MEDICARE PART A & B / Product Type: Medicare /    In time:10:30  Out time:11:05  Total Treatment Time (min): 35  Visit #: 3 of 16    Medicare/BCBS Only   Total Timed Codes (min):  25 1:1 Treatment Time:  25       Treatment Area: Low back pain [M54.5]  Thoracic back pain [M54.6]    SUBJECTIVE  Pain Level (0-10 scale): 5/10  Any medication changes, allergies to medications, adverse drug reactions, diagnosis change, or new procedure performed?: [x] No    [] Yes (see summary sheet for update)  Subjective functional status/changes:   [] No changes reported  Pt reports continued back pain on her left side. No change since IE. Pt reports performing HEP. OBJECTIVE      Modality rationale: Decrease pain to improve the patients ability to perform ADLs.     Min Type Additional Details    [] Estim:  []Unatt       []IFC  []Premod                        []Other:  []w/ice   []w/heat  Position:  Location:    [] Estim: []Att    []TENS instruct  []NMES                    []Other:  []w/US   []w/ice   []w/heat  Position:  Location:    []  Traction: [] Cervical       []Lumbar                       [] Prone          []Supine                       []Intermittent   []Continuous Lbs:  [] before manual  [] after manual    []  Ultrasound: []Continuous   [] Pulsed                           []1MHz   []3MHz W/cm2:  Location:    []  Iontophoresis with dexamethasone         Location: [] Take home patch   [] In clinic   10 []  Ice     [x]  heat  []  Ice massage  []  Laser   []  Anodyne Position:prone  Location:lumbar    []  Laser with stim  []  Other:  Position:  Location:    []  Vasopneumatic Device Pressure:       [] lo [] med [] hi   Temperature: [] lo [] med [] hi   [] Skin assessment post-treatment:  []intact []redness- no adverse reaction    []redness - adverse reaction:     17 min Therapeutic Exercise:  [] See flow sheet :   Rationale: increase ROM and increase strength to improve the patients ability to tolerate ADLs. 8 min Manual Therapy:  STM to left lumbar paraspinals and QL   Rationale: decrease pain, increase ROM and increase tissue extensibility to improve tolerance to ADLs. With   [] TE   [] TA   [] neuro   [] other: Patient Education: [x] Review HEP    [] Progressed/Changed HEP based on:   [] positioning   [] body mechanics   [] transfers   [] heat/ice application    [] other:      Other Objective/Functional Measures: trigger points noted through left lumbar paraspinals     Pain Level (0-10 scale) post treatment: 3/10    ASSESSMENT/Changes in Function: Pt has decreased symptoms after treatment. PT demonstrates poor mobility through lumbar region. Patient will continue to benefit from skilled PT services to modify and progress therapeutic interventions, address functional mobility deficits, address ROM deficits, address strength deficits, analyze and address soft tissue restrictions and analyze and cue movement patterns to attain remaining goals. []  See Plan of Care  []  See progress note/recertification  []  See Discharge Summary         Progress towards goals / Updated goals:  Short Term Goals: To be accomplished in 2-3 weeks:  1. Pt will be I and compliant with HEP to promote self management of symptoms. Reports compliance 9/19/2019  2. Pt will demonstrate ability to attain neutral trunk posture without increase in back pain to improve mechanics with ADLs. Tends ot lean to the right 9/19/2019  Long Term Goals: To be accomplished in 8 weeks:  1. Pt will demonstrate neutral pelvic alignment in frontal plane to improve lumbopelvic irritation with daily tasks. WNL today 9/19/2019  2. Pt will improve left hip strength to 4/5 for improved stability with ADLs and reduced back pain.   3. Pt will report ability to sit for 1 hour without increase in pain to allow return to [employer] services. 4. Pt will improve FOTO score to 61 to demonstrate improved quality of life and function.     PLAN  []  Upgrade activities as tolerated     [x]  Continue plan of care  []  Update interventions per flow sheet       []  Discharge due to:_  []  Other:_      Chidi Hammond, PTA 9/26/2019  11:12 AM    Future Appointments   Date Time Provider Micha Kaminski   10/1/2019 11:00 [facility]   10/3/2019 11:00 AM Leonard Seaman, PT [facility]   10/8/2019 10:30 AM Chari Burton, PTA [facility]   10/10/2019 11:00 AM Vinny Jerome [facility] No

## 2019-10-01 ENCOUNTER — APPOINTMENT (OUTPATIENT)
Dept: PHYSICAL THERAPY | Age: 69
End: 2019-10-01
Payer: MEDICARE

## 2019-10-03 ENCOUNTER — HOSPITAL ENCOUNTER (OUTPATIENT)
Dept: PHYSICAL THERAPY | Age: 69
Discharge: HOME | End: 2019-10-03
Payer: MEDICARE

## 2019-10-03 PROCEDURE — 97530 THERAPEUTIC ACTIVITIES: CPT

## 2019-10-03 PROCEDURE — 97140 MANUAL THERAPY 1/> REGIONS: CPT

## 2019-10-03 PROCEDURE — 97110 THERAPEUTIC EXERCISES: CPT

## 2019-10-03 NOTE — PROGRESS NOTES
PT DAILY TREATMENT NOTE 10-18    Patient Name: Raisa Salcedo  Date:10/3/2019  : 1950  [x]  Patient  Verified  Payor: [place] MEDICARE / Plan: [place] MEDICARE PART A & B / Product Type: Medicare /    In time: 10:58    Out time: 11:54  Total Treatment Time (min): 56  Visit #: 4 of 16    Medicare/BCBS Only   Total Timed Codes (min): 56 1:1 Treatment Time: 49       Treatment Area: Low back pain [M54.5]  Thoracic back pain [M54.6]    SUBJECTIVE  Pain Level (0-10 scale): 4 numbness and pain  Any medication changes, allergies to medications, adverse drug reactions, diagnosis change, or new procedure performed?: [x] No    [] Yes (see summary sheet for update)  Subjective functional status/changes:   [] No changes reported  Pt report she sees the MD tomorrow and states she is going to mention her neck pain and LE numbness. Pt reports having increased numbness today in the LEs. OBJECTIVE    31 min Therapeutic Exercise:  [x] See flow sheet :   Rationale: increase ROM and increase strength to improve the patients ability to tolerate ADLs. 15 min Therapeutic Activity:  []  See flow sheet : pt education on activity modification at home and daily activities to avoid increased pain and symptoms. Rationale: increase ROM, increase strength and improve pain  to improve the patients ability to tolerate ADLs    10 min Manual Therapy: right LE pull to correct right innominate upslip, MET to correct right posterior innominate, shotgun technique   Rationale: decrease pain, increase ROM and increase tissue extensibility to improve tolerance to ADLs. With   [x] TE   [x] TA   [] neuro   [] other: Patient Education: [x] Review HEP    [x] Progressed/Changed HEP based on:   [] positioning   [] body mechanics   [] transfers   [] heat/ice application    [x] other: activity modification and performing thoracic SB/rotation AROM to HEP.       Other Objective/Functional Measures: Improvement in pelvic alignment noted post manual interventions. Spasm reported in the left buttock with PPT marches with tband along with numbness in the lateral/anterior left thigh. Spasm in the left t/s reported with 1/2 prone left donkey kicks. Pain Level (0-10 scale) post treatment: 4-5    ASSESSMENT/Changes in Function: Discussed at length with pt today regarding activity modification and taking breaks throughout the day to improve pain and symptoms. Educated pt on adding thoracic SB/rotation AROM for HEP to improve mobility and tightness. Pt continues to demonstrate increased left>right t/s paraspinal tightness and flexed forward posture. Educated pt to avoid activities that increase her numbness. We will plan on continuing therapy to improve mobility, pain, and ROM. Continue POC as tolerated. Patient will continue to benefit from skilled PT services to modify and progress therapeutic interventions, address functional mobility deficits, address ROM deficits, address strength deficits, analyze and address soft tissue restrictions and analyze and cue movement patterns to attain remaining goals. []  See Plan of Care  []  See progress note/recertification  []  See Discharge Summary         Progress towards goals / Updated goals:   Short Term Goals: To be accomplished in 2-3 weeks:  1. Pt will be I and compliant with HEP to promote self management of symptoms. Reports compliance 9/19/2019  2. Pt will demonstrate ability to attain neutral trunk posture without increase in back pain to improve mechanics with ADLs. flexed forward posture noted 10/3/2019  Long Term Goals: To be accomplished in 8 weeks:  1. Pt will demonstrate neutral pelvic alignment in frontal plane to improve lumbopelvic irritation with daily tasks. right upslip and posterior innominate noted today 10/3/2019  2. Pt will improve left hip strength to 4/5 for improved stability with ADLs and reduced back pain.  Reports spasms in the left buttock with PPT marches with tband today 10/3/2019  3. Pt will report ability to sit for 1 hour without increase in pain to allow return to [facility] services. Reports limited activity tolerance 10/3/2019  4. Pt will improve FOTO score to 61 to demonstrate improved quality of life and function.  Assess at MD note 10/3/2019    PLAN  [x]  Upgrade activities as tolerated     [x]  Continue plan of care  [x]  Update interventions per flow sheet       []  Discharge due to:_  []  Other:_      Bharat Ryan, PT 10/3/2019  11:05 AM    Future Appointments   Date Time Provider Micha Kaminski   10/8/2019 10:30 AM [facility]   10/10/2019 11:00 AM [facility]лена [facility]

## 2019-10-08 ENCOUNTER — APPOINTMENT (OUTPATIENT)
Dept: PHYSICAL THERAPY | Age: 69
End: 2019-10-08
Payer: MEDICARE

## 2019-10-10 ENCOUNTER — APPOINTMENT (OUTPATIENT)
Dept: PHYSICAL THERAPY | Age: 69
End: 2019-10-10
Payer: MEDICARE

## 2019-10-23 ENCOUNTER — OFFICE VISIT (OUTPATIENT)
Dept: ORTHOPEDIC SURGERY | Age: 69
End: 2019-10-23

## 2019-10-23 VITALS
SYSTOLIC BLOOD PRESSURE: 137 MMHG | WEIGHT: 172.4 LBS | BODY MASS INDEX: 26.13 KG/M2 | TEMPERATURE: 98 F | RESPIRATION RATE: 18 BRPM | DIASTOLIC BLOOD PRESSURE: 67 MMHG | HEIGHT: 68 IN | OXYGEN SATURATION: 98 % | HEART RATE: 90 BPM

## 2019-10-23 DIAGNOSIS — M54.16 LUMBAR RADICULOPATHY: ICD-10-CM

## 2019-10-23 DIAGNOSIS — M54.6 THORACIC SPINE PAIN: ICD-10-CM

## 2019-10-23 DIAGNOSIS — M62.838 MUSCLE SPASM: ICD-10-CM

## 2019-10-23 DIAGNOSIS — M47.816 LUMBAR FACET ARTHROPATHY: Primary | ICD-10-CM

## 2019-10-23 DIAGNOSIS — G89.29 OTHER CHRONIC PAIN: ICD-10-CM

## 2019-10-23 DIAGNOSIS — M48.061 SPINAL STENOSIS OF LUMBAR REGION WITHOUT NEUROGENIC CLAUDICATION: ICD-10-CM

## 2019-10-23 RX ORDER — TIZANIDINE 2 MG/1
1 TABLET ORAL
COMMUNITY
Start: 2019-07-12 | End: 2019-12-27 | Stop reason: SDUPTHER

## 2019-10-23 RX ORDER — PREDNISONE 5 MG/1
1 TABLET ORAL
COMMUNITY
End: 2019-11-26 | Stop reason: ALTCHOICE

## 2019-10-23 RX ORDER — DULOXETIN HYDROCHLORIDE 60 MG/1
1 CAPSULE, DELAYED RELEASE ORAL DAILY
COMMUNITY
End: 2020-05-26 | Stop reason: ALTCHOICE

## 2019-10-23 RX ORDER — METAXALONE 800 MG/1
1 TABLET ORAL EVERY EVENING
COMMUNITY
Start: 2019-07-10 | End: 2020-05-26 | Stop reason: ALTCHOICE

## 2019-10-23 RX ORDER — SERTRALINE HYDROCHLORIDE 25 MG/1
1 TABLET, FILM COATED ORAL DAILY
Refills: 0 | COMMUNITY
Start: 2019-10-02

## 2019-10-23 RX ORDER — IBUPROFEN AND FAMOTIDINE 26.6; 8 MG/1; MG/1
1 TABLET, FILM COATED ORAL
COMMUNITY
End: 2021-05-12

## 2019-10-23 NOTE — PROGRESS NOTES
[facility]  [address] [place], [facility] [address]  Phone: [phone]  Fax: [phone]    Pt's YOB: 1950    ASSESSMENT   Diagnoses and all orders for this visit:    1. Lumbar facet arthropathy  -     REFERRAL TO PHYSICAL THERAPY  -     AMB SUPPLY ORDER    2. Muscle spasm  -     REFERRAL TO PHYSICAL THERAPY    3. Lumbar radiculopathy  -     REFERRAL TO PHYSICAL THERAPY    4. Other chronic pain  -     REFERRAL TO PHYSICAL THERAPY    5. Thoracic spine pain    6. Spinal stenosis of lumbar region without neurogenic claudication  -     REFERRAL TO PHYSICAL THERAPY         IMPRESSION AND PLAN:  Karen Cardona is a 68 y.o. female with history of lumbar pain and was last seen on 07/12/2017. She c/o progressive pain in the lower back and a hot water sensation radiating down the left leg to the calf. Pt notes that she also experiences numbness in the left foot and frequent muscle spasms in the lower back. She reports minimal relief with Tylenol Arthritis and takes Neurontin 300 mg 1 tab QAM and 2 tabs QHS. 1) Pt was given information on lumbar arthritis exercises. 2) Discussed treatment options with the patient including medication, physical therapy, steroid injections, and a lumbar RFA. 3) Pt was given information on a radiofrequency ablation. 4) She was referred to physical therapy with evaluation for dry needling. 5) Discussed ordering a lumbar MRI pending response to physical therapy with dry needling. 6) Pt was prescribed a thoracolumbar support. 7) Ms. Primitivo Akins has a reminder for a \"due or due soon\" health maintenance. I have asked that she contact her primary care provider, Siri Arteaga DO, for follow-up on this health maintenance. 8)  demonstrated consistency with prescribing. 9) Patient was prescribed a thoracolumbar support which is medically necessary for the above diagnoses.  This orthosis is required for the following reason(s):   1. To reduce pain by restrictions mobility of the trunk - YES,   2. To facilitate healing following an injury to the spine or related soft tissues- NO,   3. To facilitate healing following a surgical procedure on the spine or related soft tissue- NO,   4. To otherwise support weak spinal muscles and/or deformed spine. - YES. Follow-up and Dispositions    · Return in about 6 weeks (around 12/4/2019) for PT follow up. HISTORY OF PRESENT ILLNESS:  Helene Tucker is a 68 y.o. female with history of lumbar pain and was last seen on 07/12/2017. She c/o progressive pain in the lower back and a hot water sensation radiating down the left leg to the calf. Pt notes that she also experiences numbness in the left foot. She reports frequent muscle spasms in the lower back. Pt notes occasional issues with balance, which she attributes to the numbness in her toes on the left, but denies any weakness in the legs at this time. She denies any inciting injuries or recent change in activity. Pt notes that she often finds herself leaning to the right as the day progresses. Pt has attended physical therapy but denies ever receiving dry needling. She notes that she is not particularly interested in surgical intervention at this time. Pt had previous coccygectomy, cervical, and thoracic surgeries but denies any previous lumbar surgeries. She reports minimal relief with Tylenol Arthritis. Pt admits to relief with Aleve but had to discontinue this when she noticed bloody stools. She continues to take Neurontin 300 mg 1 tab QAM and 2 tabs QHS. Pt at this time desires to proceed with physical therapy with dry needling.     Pain Scale: 4/10    PCP: Curtis Lowry DO     Past Medical History:   Diagnosis Date    Arthritis     rheumatoid arthritis    Chronic pain     Ill-defined condition     polymyalgia        Social History     Socioeconomic History    Marital status:      Spouse name: Not on file    Number of children: Not on file    Years of education: Not on file    Highest education level: Not on file   Occupational History    Not on file   Social Needs    Financial resource strain: Not on file    Food insecurity:     Worry: Not on file     Inability: Not on file    Transportation needs:     Medical: Not on file     Non-medical: Not on file   Tobacco Use    Smoking status: Never Smoker    Smokeless tobacco: Never Used   Substance and Sexual Activity    Alcohol use: No    Drug use: No    Sexual activity: Never   Lifestyle    Physical activity:     Days per week: Not on file     Minutes per session: Not on file    Stress: Not on file   Relationships    Social connections:     Talks on phone: Not on file     Gets together: Not on file     Attends Congregational service: Not on file     Active member of club or organization: Not on file     Attends meetings of clubs or organizations: Not on file     Relationship status: Not on file    Intimate partner violence:     Fear of current or ex partner: Not on file     Emotionally abused: Not on file     Physically abused: Not on file     Forced sexual activity: Not on file   Other Topics Concern    Not on file   Social History Narrative    Not on file       Current Outpatient Medications   Medication Sig Dispense Refill    DULoxetine (CYMBALTA) 60 mg capsule Take 1 Cap by mouth daily.  predniSONE (DELTASONE) 5 mg tablet Take 1 Tab by mouth.  sertraline (ZOLOFT) 25 mg tablet Take 1 Tab by mouth daily. 0    gabapentin (NEURONTIN) 300 mg capsule TAKE 2 CAPSULES BY MOUTH EVERY EVENING AS DIRECTED FOR NEUROPATHIC PAIN, RESTLESS LEGS SYNDROME. (Patient taking differently: Take 300 mg by mouth. 1 cap po qam, 2 cap po qpm) 60 Cap 0    diclofenac (VOLTAREN) 1 % gel Apply 4 g to affected area four (4) times daily. Apply 4 gm to left knee qid for osteoarthritis 5 Each 1    pramipexole (MIRAPEX) 0.5 mg tablet Take 0.5 mg by mouth nightly.  pravastatin (PRAVACHOL) 80 mg tablet Take 80 mg by mouth nightly.  ibuprofen (ADVIL) 200 mg tablet Take 600 mg by mouth every eight (8) hours as needed.  multivitamin (ONE A DAY) tablet Take 1 Tab by mouth daily.  cholecalciferol, vitamin D3, (VITAMIN D3) 2,000 unit tab Take 1 Tab by mouth daily.  Omega-3 Fatty Acids (FISH OIL) 500 mg cap Take 1 Cap by mouth daily.  ibuprofen-famotidine (DUEXIS) 800-26.6 mg tab Take 1 Tab by mouth three (3) times daily as needed.  metaxalone (SKELAXIN) 800 mg tablet Take 1 Tab by mouth every evening.  tiZANidine (ZANAFLEX) 2 mg tablet Take 1 Tab by mouth nightly as needed. Allergies   Allergen Reactions    Doxycycline Other (comments)     Lower blood sugars    Statins-Hmg-Coa Reductase Inhibitors Other (comments)         REVIEW OF SYSTEMS    Constitutional: Negative for fever, chills, or weight change. Respiratory: Negative for cough or shortness of breath. Cardiovascular: Negative for chest pain or palpitations. Gastrointestinal: Negative for acid reflux, change in bowel habits, or constipation. Genitourinary: Negative for dysuria and flank pain. Musculoskeletal: Positive for lumbar pain. Skin: Negative for rash. Neurological: Negative for headaches, dizziness, or numbness. Endo/Heme/Allergies: Negative for increased bruising. Psychiatric/Behavioral: Negative for difficulty with sleep. PHYSICAL EXAMINATION  Visit Vitals  /67   Pulse 90   Temp 98 °F (36.7 °C) (Oral)   Resp 18   Ht 5' 8\" (1.727 m)   Wt 172 lb 6.4 oz (78.2 kg)   SpO2 98%   BMI 26.21 kg/m²       Constitutional: Awake, alert, and in no acute distress. Neurological: 1+ symmetrical DTRs in the upper extremities. 1+ symmetrical DTRs in the lower extremities. Sensation to light touch is intact. Negative Lee's sign bilaterally. Skin: warm, dry, and intact. Musculoskeletal: Tenderness to palpation in the lower lumbar region.  Pain with extension and axial loading to the left. Improvement with forward flexion. No pain with internal or external rotation of her hips. Negative straight leg raise bilaterally. Tightness in left flank and paraspinous muscles      Biceps  Triceps Deltoids Wrist Ext Wrist Flex Hand Intrin   Right +4/5 +4/5 +4/5 +4/5 +4/5 +4/5   Left +4/5 +4/5 +4/5 +4/5 +4/5 +4/5      Hip Flex  Quads Hamstrings Ankle DF EHL Ankle PF   Right +4/5 +4/5 +4/5 +4/5 +4/5 +4/5   Left +4/5 +4/5 +4/5 +4/5 +4/5 +4/5     IMAGING:    Lumbar spine MRI from 04/20/2017 was personally reviewed with the Pt and demonstrated:  IMPRESSION:  Levoscoliosis. L3-4: Moderate central stenosis. Moderate posterior annular bulge. Grade 1 anterior degenerative listhesis. No evidence of advanced foraminal narrowing, lateral recess compromise, or advanced facet arthropathy.       Cervical spine MRI from 04/22/2014 was personally reviewed with the Pt and demonstrated:  Results from Hospital Encounter on 04/22/14   MRI CERV SPINE WO CONT     Narrative EXAM: MRI CERV SPINE WO CONT    INDICATION: Neck pain    COMPARISON: None. TECHNIQUE: MR imaging of the cervical spine was performed including sagittal  T1, T2, STIR; axial GRE, T2, T1. Contrast was not administered. FINDINGS:  Evidence of prior surgery with partial osseous fusion of C5-C6 and C6-C7. Please going with surgical history. Reversal of the cervical lordosis with apex  at C5-C6. Focal kyphosis and partial osseous fusion of C5-C6 with anterior  wedging of C6 vertebral body. Remainder vertebral bodies maintain normal  height. Slight anterior listhesis of C3 on C4. Mild degenerative discogenic  disease C3-C4, C4-C5 and C7-T1. The craniocervical junction is intact. The  course, caliber, and signal intensity of the spinal cord are normal.    C2/3: Mild disc osteophyte complex. Marked left and mild right facet  arthropathy. Moderate to severe left foraminal stenosis. There is posterior  ligamentous hypertrophy. Mild to moderate narrowing of the central canal, AP  diameter is 7.7 mm.    C3/4: Marked left and mild right facet hypertrophy. 8mm cystic structure just  lateral to the left facet joint image 18 axial T2 may represent facet synovial  cyst. Severe left foraminal stenosis. There is posterior ligamentous  hypertrophy. Moderate narrowing of the central canal, AP diameter is 7.1 mm. Dorsal and ventral CSF are effaced. C4/5: Disc osteophyte complex. Moderate to severe left facet hypertrophy. Moderate left foraminal stenosis. Mild/moderate central canal stenosis, AP  diameter is 8 mm. C5/6: Central canal and neural foramen are patent. C6/7: Central canal and neural foramen are patent. C7/T1: Central canal and neural foramen are patent.              Impression IMPRESSION:  1. Disc osteophyte complex and marked left facet arthropathy C2-C3 through  C4-C5 resulting in significant left foraminal stenosis and mild to moderate  central canal stenosis. 2. Postsurgical changes, detailed above. Please correlate with surgical history. Thank you for this referral.       Written by Alonso Jimenes, as dictated by Camilo Ace MD.  I, Dr. Camilo Ace confirm that all documentation is accurate.

## 2019-10-23 NOTE — PATIENT INSTRUCTIONS
Low Back Arthritis: Exercises Introduction Here are some examples of typical rehabilitation exercises for your condition. Start each exercise slowly. Ease off the exercise if you start to have pain. Your doctor or physical therapist will tell you when you can start these exercises and which ones will work best for you. When you are not being active, find a comfortable position for rest. Some people are comfortable on the floor or a medium-firm bed with a small pillow under their head and another under their knees. Some people prefer to lie on their side with a pillow between their knees. Don't stay in one position for too long. Take short walks (10 to 20 minutes) every 2 to 3 hours. Avoid slopes, hills, and stairs until you feel better. Walk only distances you can manage without pain, especially leg pain. How to do the exercises Pelvic tilt 1. Lie on your back with your knees bent. 2. \"Brace\" your stomachtighten your muscles by pulling in and imagining your belly button moving toward your spine. 3. Press your lower back into the floor. You should feel your hips and pelvis rock back. 4. Hold for 6 seconds while breathing smoothly. 5. Relax and allow your pelvis and hips to rock forward. 6. Repeat 8 to 12 times. Back stretches 1. Get down on your hands and knees on the floor. 2. Relax your head and allow it to droop. Round your back up toward the ceiling until you feel a nice stretch in your upper, middle, and lower back. Hold this stretch for as long as it feels comfortable, or about 15 to 30 seconds. 3. Return to the starting position with a flat back while you are on your hands and knees. 4. Let your back sway by pressing your stomach toward the floor. Lift your buttocks toward the ceiling. 5. Hold this position for 15 to 30 seconds. 6. Repeat 2 to 4 times. Follow-up care is a key part of your treatment and safety.  Be sure to make and go to all appointments, and call your doctor if you are having problems. It's also a good idea to know your test results and keep a list of the medicines you take. Where can you learn more? Go to http://candice-abby.info/. Enter N116 in the search box to learn more about \"Low Back Arthritis: Exercises. \" Current as of: June 26, 2019 Content Version: 12.2 © 2854-8670 [employer]. Care instructions adapted under license by [employer] (which disclaims liability or warranty for this information). If you have questions about a medical condition or this instruction, always ask your healthcare professional. [address] any warranty or liability for your use of this information. Learning About Medial Branch Block and Neurotomy What are medial branch block and neurotomy? Facet joints connect your vertebrae to each other. Problems in these joints can cause chronic (long-term) pain in the neck or back. They can sometimes affect the shoulders, arms, buttocks, or legs. Medial branch nerves are the nerves that carry many of the pain messages from your facet joints. Radiofrequency medial branch neurotomy is a type of medial branch neurotomy that is used to relieve arthritis pain. It uses radio waves to damage nerves in your neck or back so that they can no longer send pain messages to your brain. Before your doctor knows if a neurotomy will help you, he or she will do a medial branch block to find out if certain nerves are the ones that are a source of your pain. You will need two separate visits to the outpatient center or hospital to have both procedures. How is a medial branch block done? The doctor will use a tiny needle to numb the skin where you will get the block. Then he or she puts the block needle into the numbed area. You may feel some pressure, but you should not feel pain.  Using fluoroscopy (live X-ray) to guide the needle, the doctor injects medicine onto one or more nerves to make them numb. If you get relief from your pain in the next 4 to 6 hours, it's a sign that those nerves may be contributing to your pain. The relief will last only a short time. You may then have a medial branch neurotomy at a later visit to try to get longer relief. It takes 20 to 30 minutes to get the block. You can go home after the doctor watches you for about an hour. You will get instructions on how to report how much pain you have when you are at home. You will need someone to drive you home. How is medial branch neurotomy done? The doctor will use a tiny needle to numb the skin where you will get the neurotomy. Then he or she puts the neurotomy needle into the numbed area. You may feel some pressure. Using fluoroscopy (live X-ray) to guide the needle, the doctor sends radio waves through the needle to the nerve for 60 to 90 seconds. The radio waves heat the nerve, which damages it. The doctor may do this several times. And he or she may treat more than one nerve. It takes 45 to 90 minutes to get a neurotomy, depending on how many nerves are heated. You will probably go home 30 to 60 minutes later. You will need someone to drive you home. What can you expect after a neurotomy? You may feel a little sore or tender at the injection site at first. But after a successful neurotomy, most people have pain relief right away. It often lasts for 9 to 12 months or longer. Sometimes the pain relief is permanent. If your pain does come back, it may mean that the damaged nerve has healed and can send pain messages again. Or it can mean that a different nerve is causing pain. Your doctor will discuss your options with you. Follow-up care is a key part of your treatment and safety.  Be sure to make and go to all appointments, and call your doctor if you are having problems. It's also a good idea to know your test results and keep a list of the medicines you take. Where can you learn more? Go to http://candice-abby.info/. Enter K308 in the search box to learn more about \"Learning About Medial Branch Block and Neurotomy. \" Current as of: March 28, 2019 Content Version: 12.2 © 9400-0169 [employer], [employer]. Care instructions adapted under license by [employer] (which disclaims liability or warranty for this information). If you have questions about a medical condition or this instruction, always ask your healthcare professional. [place] [address] any warranty or liability for your use of this information.

## 2019-10-23 NOTE — LETTER
10/23/19 Patient: Ivan Ayoub YOB: 1950 Date of Visit: 10/23/2019 Nate Hutchinson DO 
[address] [address] [place] [address] VIA Facsimile: [phone] Dear Nate Hutchinson DO, Thank you for referring Ms. Bardales Prior to [address] [facility] for evaluation. My notes for this consultation are attached. If you have questions, please do not hesitate to call me. I look forward to following your patient along with you. Sincerely, Floyd Nelson MD

## 2019-11-04 DIAGNOSIS — G57.92 NEURITIS OF LEFT LOWER EXTREMITY: ICD-10-CM

## 2019-11-04 DIAGNOSIS — M51.26 HNP (HERNIATED NUCLEUS PULPOSUS), LUMBAR: ICD-10-CM

## 2019-11-04 RX ORDER — GABAPENTIN 300 MG/1
CAPSULE ORAL
Qty: 90 CAP | Refills: 1 | Status: SHIPPED | OUTPATIENT
Start: 2019-11-04 | End: 2020-01-06 | Stop reason: SDUPTHER

## 2019-11-04 NOTE — TELEPHONE ENCOUNTER
She still plans to do the therapy. She is having trouble walking due to numbness in both feet. She is having trouble standing up straight due to her pain and is afraid she is going to fall. She would like to speak to a nurse or   Dr. Sarkis Hand.     Please call [phone]

## 2019-11-04 NOTE — TELEPHONE ENCOUNTER
Returned call to patient, verified Name/, informed patient of approval of gabapentin. Patient verbalized agreement/understanding. No further action required at this time.

## 2019-11-04 NOTE — TELEPHONE ENCOUNTER
Looks like Dr. Sahra Sandra was going to consider an MRI once she does the PT. Call pt and see what she needs.

## 2019-11-04 NOTE — TELEPHONE ENCOUNTER
Returned call to patient, verified Name/, per patient, she is having increased pain, now new onset sharp pain down her right leg with numbness/tingling. Per patient, it is more difficult to walk, per patient she is having a balance problem now. Per patient she has increased pain with standing straight/walking. Per patient if she is in her chair she is ok. Per patient, the increased pain began after she started her home exercises/stretches. Per patient she increased her gabapentin to 1 cap po qam, 2 caps po qpm, as directed by Dr. Elmer Devine. Per patient, she is running out of her gabapentin, will need a refill sent to [facility] [address] [facility] Patient was assisted with scheduling a sooner appt with Dr. Elmer Devine on tomorrow 19, at 21 [phone]. Patient verbalized agreement/understanding for appt. Patient would still like refill, as she will run out before she can be seen tomorrow.

## 2019-11-04 NOTE — TELEPHONE ENCOUNTER
Received call from patient advising she is in a lot of pain and cant walk. She advised she has shooting pain down her back and down her right leg. Her foot is now numb. She advised she wants to be seen here before she starts PT since she cant walk. I scheduled her with RS 11/7/19 @ [facility] location for a f/u appt. Patient advised she wants to discuss her dosage and wants a refill of her gabapentin.        Patient can be reached at: [phone]

## 2019-11-05 ENCOUNTER — APPOINTMENT (OUTPATIENT)
Dept: CT IMAGING | Age: 69
End: 2019-11-05
Attending: EMERGENCY MEDICINE
Payer: MEDICARE

## 2019-11-05 ENCOUNTER — OFFICE VISIT (OUTPATIENT)
Dept: ORTHOPEDIC SURGERY | Age: 69
End: 2019-11-05

## 2019-11-05 ENCOUNTER — APPOINTMENT (OUTPATIENT)
Dept: PHYSICAL THERAPY | Age: 69
End: 2019-11-05

## 2019-11-05 ENCOUNTER — HOSPITAL ENCOUNTER (EMERGENCY)
Age: 69
Discharge: HOME | End: 2019-11-05
Attending: EMERGENCY MEDICINE
Payer: MEDICARE

## 2019-11-05 VITALS
HEIGHT: 68 IN | SYSTOLIC BLOOD PRESSURE: 158 MMHG | WEIGHT: 179 LBS | BODY MASS INDEX: 27.13 KG/M2 | OXYGEN SATURATION: 100 % | HEART RATE: 102 BPM | DIASTOLIC BLOOD PRESSURE: 100 MMHG

## 2019-11-05 VITALS
SYSTOLIC BLOOD PRESSURE: 145 MMHG | HEIGHT: 68 IN | BODY MASS INDEX: 27.13 KG/M2 | DIASTOLIC BLOOD PRESSURE: 72 MMHG | RESPIRATION RATE: 16 BRPM | WEIGHT: 179 LBS | TEMPERATURE: 97.4 F | HEART RATE: 95 BPM | OXYGEN SATURATION: 99 %

## 2019-11-05 DIAGNOSIS — S16.1XXA NECK STRAIN, INITIAL ENCOUNTER: Primary | ICD-10-CM

## 2019-11-05 DIAGNOSIS — M48.02 CERVICAL SPINAL STENOSIS: ICD-10-CM

## 2019-11-05 DIAGNOSIS — R26.9 GAIT ABNORMALITY: ICD-10-CM

## 2019-11-05 DIAGNOSIS — R29.898 RIGHT ARM WEAKNESS: ICD-10-CM

## 2019-11-05 DIAGNOSIS — M62.838 MUSCLE SPASM: ICD-10-CM

## 2019-11-05 DIAGNOSIS — Z98.1 HISTORY OF FUSION OF CERVICAL SPINE: ICD-10-CM
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
## 2019-11-05 DIAGNOSIS — R29.898 RIGHT LEG WEAKNESS: ICD-10-CM

## 2019-11-05 PROCEDURE — 99283 EMERGENCY DEPT VISIT LOW MDM: CPT

## 2019-11-05 PROCEDURE — 72125 CT NECK SPINE W/O DYE: CPT

## 2019-11-05 RX ORDER — PREDNISONE 10 MG/1
TABLET ORAL
Qty: 32 TAB | Refills: 0 | Status: SHIPPED | OUTPATIENT
Start: 2019-11-05 | End: 2019-11-26 | Stop reason: ALTCHOICE

## 2019-11-05 NOTE — ED PROVIDER NOTES
HPI patient says she slipped and fell several days ago landing on her back and felt her head snapped back. Since that time she complains of some right-sided neck pain in his had difficulty turning her head to the left. She went to her physical therapy physician today for an evaluation and they were concerned about possible cervical spine fracture. She was then referred to emergency room for further evaluation. On arrival to the ER she says she is having some numbness and weakness in her right leg and right arm but that that is somewhat of a chronic problem that is been exacerbated by this injury. She also states her neck feels stiff and is sore with any type of range of motion. Past Medical History:   Diagnosis Date    Arthritis     rheumatoid arthritis    Chronic pain     Ill-defined condition     polymyalgia       Past Surgical History:   Procedure Laterality Date    COLONOSCOPY N/A 7/2/2019    COLONOSCOPY performed by Sophia Martines MD at [facility] ENDOSCOPY    HX ORTHOPAEDIC      multiple arm surgeries    HX ORTHOPAEDIC      multiple neck surgeries    HX ORTHOPAEDIC      Multiple rib repair     HX ORTHOPAEDIC      coccyx sx         History reviewed. No pertinent family history.     Social History     Socioeconomic History    Marital status:      Spouse name: Not on file    Number of children: Not on file    Years of education: Not on file    Highest education level: Not on file   Occupational History    Not on file   Social Needs    Financial resource strain: Not on file    Food insecurity:     Worry: Not on file     Inability: Not on file    Transportation needs:     Medical: Not on file     Non-medical: Not on file   Tobacco Use    Smoking status: Never Smoker    Smokeless tobacco: Never Used   Substance and Sexual Activity    Alcohol use: No    Drug use: No    Sexual activity: Never   Lifestyle    Physical activity:     Days per week: Not on file     Minutes per session: Not on file    Stress: Not on file   Relationships    Social connections:     Talks on phone: Not on file     Gets together: Not on file     Attends Taoism service: Not on file     Active member of club or organization: Not on file     Attends meetings of clubs or organizations: Not on file     Relationship status: Not on file    Intimate partner violence:     Fear of current or ex partner: Not on file     Emotionally abused: Not on file     Physically abused: Not on file     Forced sexual activity: Not on file   Other Topics Concern    Not on file   Social History Narrative    Not on file         ALLERGIES: Doxycycline and Statins-hmg-coa reductase inhibitors    Review of Systems   Constitutional: Negative. HENT: Negative. Eyes: Negative. Respiratory: Negative. Cardiovascular: Negative. Gastrointestinal: Negative. Genitourinary: Negative. Musculoskeletal: Positive for neck pain and neck stiffness. Skin: Negative. Neurological: Positive for numbness. Psychiatric/Behavioral: Negative. Vitals:    11/05/19 1206   BP: 148/76   Pulse: 98   Resp: 18   Temp: 97.4 °F (36.3 °C)   SpO2: 100%   Weight: 81.2 kg (179 lb)   Height: 5' 8\" (1.727 m)            Physical Exam   Constitutional: She is oriented to person, place, and time. She appears well-developed. HENT:   Head: Normocephalic and atraumatic. Mouth/Throat: Oropharynx is clear and moist.   Eyes: Pupils are equal, round, and reactive to light. Conjunctivae and EOM are normal.   Neck: Neck supple. Patient has palpable paracervical muscle tenderness and spasm on the right. There is some mild central cervical spine tenderness to palpation. No masses or skin changes noted. Cardiovascular: Normal rate and regular rhythm. Pulmonary/Chest: Effort normal and breath sounds normal.   Abdominal: Soft. Musculoskeletal: Normal range of motion. Neurological: She is alert and oriented to person, place, and time.    Skin: Skin is warm and dry. Psychiatric: She has a normal mood and affect. Nursing note and vitals reviewed. MDM         Procedures      Reviewed the radiology results with the patient who understands and agrees with the disposition and follow-up plan.   Sherie Severs, MD 2:40 PM

## 2019-11-05 NOTE — PATIENT INSTRUCTIONS
Neck Arthritis: Exercises  Introduction  Here are some examples of exercises for you to try. The exercises may be suggested for a condition or for rehabilitation. Start each exercise slowly. Ease off the exercises if you start to have pain. You will be told when to start these exercises and which ones will work best for you. How to do the exercises  Neck stretches to the side    1. This stretch works best if you keep your shoulder down as you lean away from it. To help you remember to do this, start by relaxing your shoulders and lightly holding on to your thighs or your chair. 2. Tilt your head toward your shoulder and hold for 15 to 30 seconds. Let the weight of your head stretch your muscles. 3. Repeat 2 to 4 times toward each shoulder. Chin tuck    1. Lie on the floor with a rolled-up towel under your neck. Your head should be touching the floor. 2. Slowly bring your chin toward your chest.  3. Hold for a count of 6, and then relax for up to 10 seconds. 4. Repeat 8 to 12 times. Active cervical rotation    1. Sit in a firm chair, or stand up straight. 2. Keeping your chin level, turn your head to the right, and hold for 15 to 30 seconds. 3. Turn your head to the left and hold for 15 to 30 seconds. 4. Repeat 2 to 4 times to each side. Shoulder blade squeeze    1. While standing, squeeze your shoulder blades together. 2. Do not raise your shoulders up as you are squeezing. 3. Hold for 6 seconds. 4. Repeat 8 to 12 times. Shoulder rolls    1. Sit comfortably with your feet shoulder-width apart. You can also do this exercise standing up. 2. Roll your shoulders up, then back, and then down in a smooth, circular motion. 3. Repeat 2 to 4 times. Follow-up care is a key part of your treatment and safety. Be sure to make and go to all appointments, and call your doctor if you are having problems.  It's also a good idea to know your test results and keep a list of the medicines you take.  Where can you learn more? Go to http://candice-abby.info/. Enter Q938 in the search box to learn more about \"Neck Arthritis: Exercises. \"  Current as of: June 26, 2019  Content Version: 12.2  © 3403-0900 "[employer] Incorporated. Care instructions adapted under license by [employer] (which disclaims liability or warranty for this information). If you have questions about a medical condition or this instruction, always ask your healthcare professional. [address] any warranty or liability for your use of this information.

## 2019-11-05 NOTE — ROUTINE PROCESS
Dianne Addison is a 68 y.o. female that was discharged in stable. Pt was accompanied by . Pt is not driving. The patients diagnosis, condition and treatment were explained to  patient and aftercare instructions were given. The patient verbalized understanding. Patient armband removed and shredded.

## 2019-11-05 NOTE — LETTER
11/5/19 Patient: Benito Gabriel YOB: 1950 Date of Visit: 11/5/2019 Cuca Taylor DO 
[address] [facility] [address] [address] [place] [address] VIA Facsimile: [phone] Dear Cuca Taylor DO, Thank you for referring Ms. Jennifer Scott to [address] [place] [address] for evaluation. My notes for this consultation are attached. If you have questions, please do not hesitate to call me. I look forward to following your patient along with you. Sincerely, Savannah King MD

## 2019-11-05 NOTE — PROGRESS NOTES
[facility]  [address], [facility] [address]  Phone: [phone]  Fax: [phone]    Pt's YOB: 1950    ASSESSMENT   Diagnoses and all orders for this visit:    1. Fall, initial encounter  -     predniSONE (DELTASONE) 10 mg tablet; 6 pills Day 1, 5 pills Day 2, 4 pills Day 3&4, 3 pills Day 5&6, 2 pills Day 7&8, 1 pill Day 9&10, 1/2 pill Day 11&12    2. Right arm weakness  -     predniSONE (DELTASONE) 10 mg tablet; 6 pills Day 1, 5 pills Day 2, 4 pills Day 3&4, 3 pills Day 5&6, 2 pills Day 7&8, 1 pill Day 9&10, 1/2 pill Day 11&12    3. Right leg weakness  -     predniSONE (DELTASONE) 10 mg tablet; 6 pills Day 1, 5 pills Day 2, 4 pills Day 3&4, 3 pills Day 5&6, 2 pills Day 7&8, 1 pill Day 9&10, 1/2 pill Day 11&12    4. Gait abnormality  -     predniSONE (DELTASONE) 10 mg tablet; 6 pills Day 1, 5 pills Day 2, 4 pills Day 3&4, 3 pills Day 5&6, 2 pills Day 7&8, 1 pill Day 9&10, 1/2 pill Day 11&12    5. Muscle spasm  -     predniSONE (DELTASONE) 10 mg tablet; 6 pills Day 1, 5 pills Day 2, 4 pills Day 3&4, 3 pills Day 5&6, 2 pills Day 7&8, 1 pill Day 9&10, 1/2 pill Day 11&12    6. Cervical spinal stenosis    7. History of fusion of cervical spine  -     predniSONE (DELTASONE) 10 mg tablet; 6 pills Day 1, 5 pills Day 2, 4 pills Day 3&4, 3 pills Day 5&6, 2 pills Day 7&8, 1 pill Day 9&10, 1/2 pill Day 11&12         IMPRESSION AND PLAN:  Tamika Govea is a 68 y.o. female with history of lumbar pain. She fell on Sunday, 11/03/2019 and reports difficulty holding up her neck. Pt complains of increased weakness arms and legs, R>L, and numbness/tingling in the hands. She has been taking Aleve 2 tabs QAM as needed. 1) Pt was given information on cervical arthritis exercises. 2) Her  drove her to the ER. Pt has new right arm and right leg weakness, has limited range of motion, and has a positive Lee's test on the right.    3) Pt was prescribed a large prednisone taper. 4) Ms. Annie Baker has a reminder for a \"due or due soon\" health maintenance. I have asked that she contact her primary care provider, Natalio Patterson DO, for follow-up on this health maintenance. 5)  demonstrated consistency with prescribing. 6) I spoke with Dr Felipe Duarte in the ER at [facility] and discussed pt's fall, weakness and the need for advanced imaging studies. Follow-up and Dispositions    · Return in about 1 week (around 11/12/2019) for Diagnostic Test follow up. HISTORY OF PRESENT ILLNESS:  Don Coyne is a 68 y.o. female with history of lumbar pain and presents to the office today for follow up. She fell on Sunday, 11/03/2019. Pt notes that she has experienced numbness and pain radiating down the left leg. She was stepping onto her patio with her left leg, but since she was unable to feel the leg, she tumbled over. Pt notes that when she was tumbling, she hyperextended the neck. She reports difficulty holding up her neck and notes some swelling in the neck. Pt also complains of increased weakness arms and legs, R>L, and numbness/tingling in the hands. She notes that her legs often feel heavy and she reports occasionally dragging the legs with ambulation. Pt has been taking Aleve 2 tabs QAM as needed. She was taking prednisone 5 mg daily as prescribed by Dr. Karol Espinoza for polymyalgia rheumatica but she discontinued this since her last office visit. Pt last took a Medrol Dosepak in 09/2019 with relief. Of note, she had previous cervical surgery with Dr. Casper Centers years ago. Pt at this time desires to proceed with cervical x-rays.     Pain Scale: 8/10    PCP: Natalio Patterson DO     Past Medical History:   Diagnosis Date    Arthritis     rheumatoid arthritis    Chronic pain     Ill-defined condition     polymyalgia        Social History     Socioeconomic History    Marital status:      Spouse name: Not on file    Number of children: Not on file    Years of education: Not on file    Highest education level: Not on file   Occupational History    Not on file   Social Needs    Financial resource strain: Not on file    Food insecurity:     Worry: Not on file     Inability: Not on file    Transportation needs:     Medical: Not on file     Non-medical: Not on file   Tobacco Use    Smoking status: Never Smoker    Smokeless tobacco: Never Used   Substance and Sexual Activity    Alcohol use: No    Drug use: No    Sexual activity: Never   Lifestyle    Physical activity:     Days per week: Not on file     Minutes per session: Not on file    Stress: Not on file   Relationships    Social connections:     Talks on phone: Not on file     Gets together: Not on file     Attends Buddhist service: Not on file     Active member of club or organization: Not on file     Attends meetings of clubs or organizations: Not on file     Relationship status: Not on file    Intimate partner violence:     Fear of current or ex partner: Not on file     Emotionally abused: Not on file     Physically abused: Not on file     Forced sexual activity: Not on file   Other Topics Concern    Not on file   Social History Narrative    Not on file       Current Outpatient Medications   Medication Sig Dispense Refill    predniSONE (DELTASONE) 10 mg tablet 6 pills Day 1, 5 pills Day 2, 4 pills Day 3&4, 3 pills Day 5&6, 2 pills Day 7&8, 1 pill Day 9&10, 1/2 pill Day 11&12 32 Tab 0    gabapentin (NEURONTIN) 300 mg capsule Take 1 Cap by mouth daily AND 2 Caps every evening. Max Daily Amount: 900 mg. 90 Cap 1    DULoxetine (CYMBALTA) 60 mg capsule Take 1 Cap by mouth daily.  predniSONE (DELTASONE) 5 mg tablet Take 1 Tab by mouth.  sertraline (ZOLOFT) 25 mg tablet Take 1 Tab by mouth daily. 0    pramipexole (MIRAPEX) 0.5 mg tablet Take 0.5 mg by mouth nightly.  pravastatin (PRAVACHOL) 80 mg tablet Take 80 mg by mouth nightly.       ibuprofen (ADVIL) 200 mg tablet Take 600 mg by mouth every eight (8) hours as needed.  multivitamin (ONE A DAY) tablet Take 1 Tab by mouth daily.  cholecalciferol, vitamin D3, (VITAMIN D3) 2,000 unit tab Take 1 Tab by mouth daily.  Omega-3 Fatty Acids (FISH OIL) 500 mg cap Take 1 Cap by mouth daily.  ibuprofen-famotidine (DUEXIS) 800-26.6 mg tab Take 1 Tab by mouth three (3) times daily as needed.  metaxalone (SKELAXIN) 800 mg tablet Take 1 Tab by mouth every evening.  tiZANidine (ZANAFLEX) 2 mg tablet Take 1 Tab by mouth nightly as needed.  diclofenac (VOLTAREN) 1 % gel Apply 4 g to affected area four (4) times daily. Apply 4 gm to left knee qid for osteoarthritis 5 Each 1       Allergies   Allergen Reactions    Doxycycline Other (comments)     Lower blood sugars    Statins-Hmg-Coa Reductase Inhibitors Other (comments)         REVIEW OF SYSTEMS    Constitutional: Negative for fever, chills, or weight change. Respiratory: Negative for cough or shortness of breath. Cardiovascular: Negative for chest pain or palpitations. Gastrointestinal: Negative for acid reflux, change in bowel habits, or constipation. Genitourinary: Negative for dysuria and flank pain. Musculoskeletal: Positive for cervical pain. Positive for right arm and right leg weakness. Skin: Negative for rash. Neurological: Negative for headaches, dizziness. Positive for numbness in the left leg. Endo/Heme/Allergies: Negative for increased bruising. Psychiatric/Behavioral: Negative for difficulty with sleep. PHYSICAL EXAMINATION  Visit Vitals  BP (!) 158/100 (BP 1 Location: Right arm, BP Patient Position: Sitting)   Pulse (!) 102   Ht 5' 8\" (1.727 m)   Wt 179 lb (81.2 kg)   SpO2 100%   BMI 27.22 kg/m²       Constitutional: Awake, alert, and in no acute distress. Neurological: 1+ symmetrical DTRs in the upper extremities. 1+ symmetrical DTRs in the lower extremities. Sensation to light touch is intact.  Positive Lee's sign on the right; negative on the left. Skin: warm, dry, and intact. Musculoskeletal: Marked decreased range of motion with side to side cervical flexion. Very limited range of motion with cervical extension. No pain with extension, axial loading, or forward flexion. No pain with internal or external rotation of her hips. Negative straight leg raise bilaterally. Biceps  Triceps Deltoids Wrist Ext Wrist Flex Hand Intrin   Right +3 to -4/5 +3 to -4/5 +3 to -4/5 +3 to -4/5 +3 to -4/5 +3 to -4/5   Left +4/5 +4/5 +4/5 +4/5 +4/5 +4/5      Hip Flex  Quads Hamstrings Ankle DF EHL Ankle PF   Right +3 to -4/5 +3 to -4/5 +3 to -4/5 +3 to -4/5 +3 to -4/5 +3 to -4/5   Left +4/5 +4/5 +4/5 +4/5 +4/5 +4/5     IMAGING:    Lumbar spine MRI from 04/20/2017 was personally reviewed with the Pt and demonstrated:  IMPRESSION:  Levoscoliosis. L3-4: Moderate central stenosis. Moderate posterior annular bulge. Grade 1 anterior degenerative listhesis. No evidence of advanced foraminal narrowing, lateral recess compromise, or advanced facet arthropathy.       Cervical spine MRI from 04/22/2014 was personally reviewed  and demonstrated:  Results from Hospital Encounter on 04/22/14   MRI CERV SPINE WO CONT     Narrative EXAM: MRI CERV SPINE WO CONT    INDICATION: Neck pain    COMPARISON: None. TECHNIQUE: MR imaging of the cervical spine was performed including sagittal  T1, T2, STIR; axial GRE, T2, T1. Contrast was not administered. FINDINGS:  Evidence of prior surgery with partial osseous fusion of C5-C6 and C6-C7. Please going with surgical history. Reversal of the cervical lordosis with apex  at C5-C6. Focal kyphosis and partial osseous fusion of C5-C6 with anterior  wedging of C6 vertebral body. Remainder vertebral bodies maintain normal  height. Slight anterior listhesis of C3 on C4. Mild degenerative discogenic  disease C3-C4, C4-C5 and C7-T1. The craniocervical junction is intact.  The  course, caliber, and signal intensity of the spinal cord are normal.    C2/3: Mild disc osteophyte complex. Marked left and mild right facet  arthropathy. Moderate to severe left foraminal stenosis. There is posterior  ligamentous hypertrophy. Mild to moderate narrowing of the central canal, AP  diameter is 7.7 mm.    C3/4: Marked left and mild right facet hypertrophy. 8mm cystic structure just  lateral to the left facet joint image 18 axial T2 may represent facet synovial  cyst. Severe left foraminal stenosis. There is posterior ligamentous  hypertrophy. Moderate narrowing of the central canal, AP diameter is 7.1 mm. Dorsal and ventral CSF are effaced. C4/5: Disc osteophyte complex. Moderate to severe left facet hypertrophy. Moderate left foraminal stenosis. Mild/moderate central canal stenosis, AP  diameter is 8 mm. C5/6: Central canal and neural foramen are patent. C6/7: Central canal and neural foramen are patent. C7/T1: Central canal and neural foramen are patent.              Impression IMPRESSION:  1. Disc osteophyte complex and marked left facet arthropathy C2-C3 through  C4-C5 resulting in significant left foraminal stenosis and mild to moderate  central canal stenosis. 2. Postsurgical changes, detailed above. Please correlate with surgical history. Thank you for this referral.        Written by Kelly Meek, as dictated by Ruel Felix MD.  I, Dr. Rule Felix confirm that all documentation is accurate.

## 2019-11-05 NOTE — DISCHARGE INSTRUCTIONS
Patient Education        Neck Strain: Care Instructions  Your Care Instructions    You have strained the muscles and ligaments in your neck. A sudden, awkward movement can strain the neck. This often occurs with falls or car accidents or during certain sports. Everyday activities like working on a computer or sleeping can also cause neck strain if they force you to hold your neck in an awkward position for a long time. It is common for neck pain to get worse for a day or two after an injury, but it should start to feel better after that. You may have more pain and stiffness for several days before it gets better. This is expected. It may take a few weeks or longer for it to heal completely. Good home treatment can help you get better faster and avoid future neck problems. Follow-up care is a key part of your treatment and safety. Be sure to make and go to all appointments, and call your doctor if you are having problems. It's also a good idea to know your test results and keep a list of the medicines you take. How can you care for yourself at home? · If you were given a neck brace (cervical collar) to limit neck motion, wear it as instructed for as many days as your doctor tells you to. Do not wear it longer than you were told to. Wearing a brace for too long can make neck stiffness worse and weaken the neck muscles. · You can try using heat or ice to see if it helps. ? Try using a heating pad on a low or medium setting for 15 to 20 minutes every 2 to 3 hours. Try a warm shower in place of one session with the heating pad. You can also buy single-use heat wraps that last up to 8 hours. ? You can also try an ice pack for 10 to 15 minutes every 2 to 3 hours. · Take pain medicines exactly as directed. ? If the doctor gave you a prescription medicine for pain, take it as prescribed. ? If you are not taking a prescription pain medicine, ask your doctor if you can take an over-the-counter medicine.   · Gently rub the area to relieve pain and help with blood flow. Do not massage the area if it hurts to do so. · Do not do anything that makes the pain worse. Take it easy for a couple of days. You can do your usual activities if they do not hurt your neck or put it at risk for more stress or injury. · Try sleeping on a special neck pillow. Place it under your neck, not under your head. Placing a tightly rolled-up towel under your neck while you sleep will also work. If you use a neck pillow or rolled towel, do not use your regular pillow at the same time. · To prevent future neck pain, do exercises to stretch and strengthen your neck and back. Learn how to use good posture, safe lifting techniques, and proper body mechanics. When should you call for help? Call 911 anytime you think you may need emergency care. For example, call if:    · You are unable to move an arm or a leg at all.   [facility] your doctor now or seek immediate medical care if:    · You have new or worse symptoms in your arms, legs, chest, belly, or buttocks. Symptoms may include:  ? Numbness or tingling. ? Weakness. ? Pain.     · You lose bladder or bowel control.    Watch closely for changes in your health, and be sure to contact your doctor if:    · You are not getting better as expected. Where can you learn more? Go to http://candice-abby.info/. Enter M253 in the search box to learn more about \"Neck Strain: Care Instructions. \"  Current as of: June 26, 2019  Content Version: 12.2  © 3973-7241 [employer]. Care instructions adapted under license by [employer] (which disclaims liability or warranty for this information). If you have questions about a medical condition or this instruction, always ask your healthcare professional. [address] any warranty or liability for your use of this information.

## 2019-11-05 NOTE — ED TRIAGE NOTES
Patient states she suffers from chronic back pain, has had multiple surgeries. Was tripped up by dog on Sunday and fell backwards. States her left leg has been numb, but now right leg is numb also. Was sent here from spine center for CT.

## 2019-11-06 ENCOUNTER — TELEPHONE (OUTPATIENT)
Dept: ORTHOPEDIC SURGERY | Age: 69
End: 2019-11-06

## 2019-11-06 NOTE — TELEPHONE ENCOUNTER
Returned call to patient, verified Name/, informed patient of below. Patient verbalized agreement/understanding. Patient would like to add Neck PT/Dry Needling to Physical Therapy plan. Please discuss with Dr. Hafsa Carpenter

## 2019-11-06 NOTE — TELEPHONE ENCOUNTER
Patient called for . Patient said that [address] [place] [address] saw her yesterday. That she had the patient go to the ER for a CT Scan. Patient said she went and had it done. Patient would like to know what [address] [place] [address] wants her to do now. What is the next step. If she wants her to have therapy or what would be next. Patient tel. [phone].

## 2019-11-06 NOTE — TELEPHONE ENCOUNTER
CT of neck is stable.  She should continue her current Tx plan, PT and meds, start Pedpak Dr Awilda Alejandro sent yesterday and Gabapentin

## 2019-11-07 NOTE — TELEPHONE ENCOUNTER
Spoke with patient -- she is aware of her cervical CT results (no acute fracture)  She has started the prednisone taper and states she needs orders for cervical/lumbar PT with dry needling because she has been to the ER -- will contact [facility] to clarify orders and she will resume PT next week.

## 2019-11-07 NOTE — TELEPHONE ENCOUNTER
Roslyn KENNEDY PT called and asked if a nurse could give her a call, stated patient went to the ER for her neck , would like to be advised on patient should continue PT . If so , she would need a new order.     Roslyn GARCIA:[phone]

## 2019-11-07 NOTE — TELEPHONE ENCOUNTER
Patient called to advise [facility] needs an updated p/t order and she was hoping to resume therapy as soon as possible.

## 2019-11-12 ENCOUNTER — TELEPHONE (OUTPATIENT)
Dept: ORTHOPEDIC SURGERY | Age: 69
End: 2019-11-12

## 2019-11-12 DIAGNOSIS — M48.02 CERVICAL SPINAL STENOSIS: Primary | ICD-10-CM

## 2019-11-12 DIAGNOSIS — M47.816 LUMBAR FACET ARTHROPATHY: ICD-10-CM

## 2019-11-12 NOTE — TELEPHONE ENCOUNTER
Patient requested order for dry needling to be sent to [facility]. Patient request that order contain both neck and back.      Best contact number for patient [phone]

## 2019-11-21 ENCOUNTER — TELEPHONE (OUTPATIENT)
Dept: ORTHOPEDIC SURGERY | Age: 69
End: 2019-11-21

## 2019-11-21 NOTE — TELEPHONE ENCOUNTER
Attempted to contact patient, phone rang numerous times, no voicemail was noted, phone call was ended on other party's behalf.

## 2019-11-22 NOTE — TELEPHONE ENCOUNTER
Returned call to patient, verified Name/, per patient she is still having some swelling in her neck, numbness/tingling to her right arm, right leg, and right foot. Per patient, she would like to be seen sooner to be evaluated. Patient also noted she has her first evaluation for PT on Monday. Patient was scheduled on  @ 4463 to see Dr. Lyndon Dubon at our [facility] facility. No further action required at this time.

## 2019-11-25 ENCOUNTER — APPOINTMENT (OUTPATIENT)
Dept: PHYSICAL THERAPY | Age: 69
End: 2019-11-25

## 2019-11-26 ENCOUNTER — OFFICE VISIT (OUTPATIENT)
Dept: ORTHOPEDIC SURGERY | Age: 69
End: 2019-11-26

## 2019-11-26 VITALS
OXYGEN SATURATION: 98 % | DIASTOLIC BLOOD PRESSURE: 83 MMHG | TEMPERATURE: 97.7 F | SYSTOLIC BLOOD PRESSURE: 143 MMHG | WEIGHT: 185.6 LBS | RESPIRATION RATE: 18 BRPM | BODY MASS INDEX: 27.49 KG/M2 | HEART RATE: 95 BPM | HEIGHT: 69 IN

## 2019-11-26 DIAGNOSIS — Z98.1 HISTORY OF FUSION OF CERVICAL SPINE: ICD-10-CM

## 2019-11-26 DIAGNOSIS — M48.02 CERVICAL SPINAL STENOSIS: Primary | ICD-10-CM
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
## 2019-11-26 DIAGNOSIS — R29.898 RIGHT ARM WEAKNESS: ICD-10-CM

## 2019-11-26 DIAGNOSIS — R26.9 GAIT ABNORMALITY: ICD-10-CM

## 2019-11-26 DIAGNOSIS — R29.898 RIGHT LEG WEAKNESS: ICD-10-CM

## 2019-11-26 DIAGNOSIS — M62.838 MUSCLE SPASM: ICD-10-CM

## 2019-11-26 DIAGNOSIS — M53.3 SACROILIAC JOINT PAIN: ICD-10-CM

## 2019-11-26 NOTE — PROGRESS NOTES
[facility]  [address] [place], [address]  Phone: [phone]  Fax: [phone]    Pt's YOB: 1950    ASSESSMENT   Diagnoses and all orders for this visit:    1. Cervical spinal stenosis  -     MRI CERV SPINE W WO CONT; Future    2. Fall, subsequent encounter  -     REFERRAL TO NEUROLOGY  -     MRI CERV SPINE W WO CONT; Future    3. Right arm weakness  -     REFERRAL TO NEUROLOGY  -     MRI CERV SPINE W WO CONT; Future    4. Gait abnormality  -     REFERRAL TO NEUROLOGY  -     MRI CERV SPINE W WO CONT; Future    5. Muscle spasm    6. Right leg weakness  -     REFERRAL TO NEUROLOGY    7. Sacroiliac joint pain    8. History of fusion of cervical spine         IMPRESSION AND PLAN:  Whitney Fleischer is a 68 y.o. female with history of lumbar pain. She is experiencing bilateral hand and feet numbness. Pt reports a recent fall where she hit her head because she  did not lift her leg high enough. Pt has been taking Aleve 2 tabs QAM as needed. Pt at this time desires to proceed with cervical MRI. 1) Pt was given information on cervical arthritis exercises. 2) She was referred to a neurology-- she has history of falling with most recent episode with 5 minutes+ unresponsive -- memory impaired of the event -she declines brain MRI (recommended) at this time; has right arm and right leg weakness and history of falling  3) Cervical MRI was ordered to assess for progressive spinal stenosis, history of falls and balance issues, right arm and leg weakness; pt offered MRI with sedation due to extreme claustrophobia but she requests sitting MRI  4) Ms. Celeste Ramon has a reminder for a \"due or due soon\" health maintenance. I have asked that she contact her primary care provider, Lis Jj DO, for follow-up on this health maintenance. 5)  demonstrated consistency with prescribing.    6) Pt will continue with physical therapy  7) Pt will follow up in 2 weeks -- she was counseled about any worsening of her symptoms and if she has any further falls, she needs to be evaluated sooner. HISTORY OF PRESENT ILLNESS:  Perez Vasquez is a 68 y.o. female with history of lumbar pain and presents to the office today for follow up. She is experiencing bilateral hand and feet numbness. Pt reports a recent fall where she hit her head because she  did not lift her leg high enough. Pt notes her daughter was talking to her after the fall and she did not respond because she lost consciousness. . She admits to arm and leg weakness as well and a frequent loss of balance. Pt denies any left sided sxs at this time. Pt has been taking Aleve 2 tabs QAM as needed. She was taking prednisone 5 mg daily as prescribed by Dr. Frank Montgomery for polymyalgia rheumatica but she discontinued this. She denies any loss of bowel/bladder control. Pt at this time desires to proceed with cervical MRI.     Pain Scale: 4/10    PCP: Yovani Saucedo DO     Past Medical History:   Diagnosis Date    Arthritis     rheumatoid arthritis    Chronic pain     Ill-defined condition     polymyalgia        Social History     Socioeconomic History    Marital status:      Spouse name: Not on file    Number of children: Not on file    Years of education: Not on file    Highest education level: Not on file   Occupational History    Not on file   Social Needs    Financial resource strain: Not on file    Food insecurity:     Worry: Not on file     Inability: Not on file    Transportation needs:     Medical: Not on file     Non-medical: Not on file   Tobacco Use    Smoking status: Never Smoker    Smokeless tobacco: Never Used   Substance and Sexual Activity    Alcohol use: No    Drug use: No    Sexual activity: Never   Lifestyle    Physical activity:     Days per week: Not on file     Minutes per session: Not on file    Stress: Not on file   Relationships    Social connections:     Talks on phone: Not on file     Gets together: Not on file     Attends Baptism service: Not on file     Active member of club or organization: Not on file     Attends meetings of clubs or organizations: Not on file     Relationship status: Not on file    Intimate partner violence:     Fear of current or ex partner: Not on file     Emotionally abused: Not on file     Physically abused: Not on file     Forced sexual activity: Not on file   Other Topics Concern    Not on file   Social History Narrative    Not on file       Current Outpatient Medications   Medication Sig Dispense Refill    gabapentin (NEURONTIN) 300 mg capsule Take 1 Cap by mouth daily AND 2 Caps every evening. Max Daily Amount: 900 mg. 90 Cap 1    DULoxetine (CYMBALTA) 60 mg capsule Take 1 Cap by mouth daily.  sertraline (ZOLOFT) 25 mg tablet Take 1 Tab by mouth daily. 0    diclofenac (VOLTAREN) 1 % gel Apply 4 g to affected area four (4) times daily. Apply 4 gm to left knee qid for osteoarthritis 5 Each 1    pramipexole (MIRAPEX) 0.5 mg tablet Take 0.5 mg by mouth nightly.  pravastatin (PRAVACHOL) 80 mg tablet Take 80 mg by mouth nightly.  ibuprofen (ADVIL) 200 mg tablet Take 600 mg by mouth every eight (8) hours as needed.  multivitamin (ONE A DAY) tablet Take 1 Tab by mouth daily.  cholecalciferol, vitamin D3, (VITAMIN D3) 2,000 unit tab Take 1 Tab by mouth daily.  Omega-3 Fatty Acids (FISH OIL) 500 mg cap Take 1 Cap by mouth daily.  ibuprofen-famotidine (DUEXIS) 800-26.6 mg tab Take 1 Tab by mouth three (3) times daily as needed.  metaxalone (SKELAXIN) 800 mg tablet Take 1 Tab by mouth every evening.  tiZANidine (ZANAFLEX) 2 mg tablet Take 1 Tab by mouth nightly as needed.          Allergies   Allergen Reactions    Doxycycline Other (comments)     Lower blood sugars    Statins-Hmg-Coa Reductase Inhibitors Other (comments)         REVIEW OF SYSTEMS    Constitutional: Negative for fever, chills, or weight change. Respiratory: Negative for cough or shortness of breath. Cardiovascular: Negative for chest pain or palpitations. Gastrointestinal: Negative for acid reflux, change in bowel habits, or constipation. Genitourinary: Negative for dysuria and flank pain. Musculoskeletal: Positive for cervical pain. Positive for right arm and right leg weakness. Skin: Negative for rash. Neurological: Negative for headaches, dizziness. Positive for numbness in the left leg. Endo/Heme/Allergies: Negative for increased bruising. Psychiatric/Behavioral: Negative for difficulty with sleep. PHYSICAL EXAMINATION  Visit Vitals  /83   Pulse 95   Temp 97.7 °F (36.5 °C) (Oral)   Resp 18   Ht 5' 8.5\" (1.74 m)   Wt 185 lb 9.6 oz (84.2 kg)   SpO2 98%   BMI 27.81 kg/m²       Constitutional: Awake, alert, and in no acute distress. Neurological: 1+ symmetrical DTRs in the upper extremities. 1+ symmetrical DTRs in the lower extremities. Sensation to light touch is intact. Positive Lee's sign on the right; negative on the left. Skin: warm, dry, and intact. Musculoskeletal: Marked decreased range of motion with side to side cervical flexion. Very limited range of motion with cervical extension. Minimal pain with extension, axial loading, and limited forward flexion. No pain with internal or external rotation of her hips. Negative straight leg raise bilaterally. Biceps  Triceps Deltoids Wrist Ext Wrist Flex Hand Intrin   Right +3 to -4/5 +3 to -4/5 +3 to -4/5 +3 to -4/5 +3 to -4/5 +3 to -4/5   Left +4/5 +4/5 +4/5 +4/5 +4/5 +4/5      Hip Flex  Quads Hamstrings Ankle DF EHL Ankle PF   Right +3 to -4/5 +3 to -4/5 +3 to -4/5 +3 to -4/5 +3 to -4/5 +3 to -4/5   Left +4/5 +4/5 +4/5 +4/5 +4/5 +4/5     IMAGING:  Cervical Spine CT from 11/05/2019 was personally reviewed with the Pt and demonstrated:  CT SCAN CERVICAL SPINE WITHOUT CONTRAST     HISTORY: Chronic back pain with multiple surgeries.  Fall backwards 2 days ago. Left leg numbness, now with additional right leg numbness.     COMPARISON: MRI cervical spine 4/22/2014.     TECHNIQUE: Axial images through the cervical spine were obtained without  intravenous contrast. Coronal and sagittal reformatted images were also obtained  for better evaluation of regional anatomy and alignment. All CT scans are  performed using dose optimization techniques as appropriate to the performed  exam including the following: Automated exposure control, adjustment of mA  and/or kV according to patient size, and use of iterative reconstructive  technique.      FINDINGS:     No acute fracture. No prevertebral soft tissue swelling. Reversal of cervical  curvature centered at C4-C5, similar but increased from 2014, likely chronic. Odontoid intact. Occipital condylar/C-1/C-2 relationships normal. Anterior C3-C4  fusion hardware has been placed. Mature bony fusion has occurred across the  C3-C4 vertebral body and left facet joint. Chronic, mature interbody fusion is  stable at C5-C6-C7. Severe facet hypertrophy at left C2-C3 and C4-C5. Degenerative disc space narrowing is mild between fusion sites at C4-C5 with  mild osteophytes. More severe degenerative disc space narrowing with endplate  sclerosis, subchondral cysts and osteophytes has newly developed at T1-T2.     There are no acute findings seen in the spinal canal within the limitations of  CT imaging. No clear evidence of marked spinal stenosis. Severe foraminal  stenoses are present at left C2-C3, left C3-C4, left C4-C5.        IMPRESSION:     1. No CT signs of cervical spine trauma. 2. Chronic interbody fusion at C5-C7. 3. Interval anterior fusion hardware placement at C3-4 since 4419 without  complication.   4. Mild degenerative changes between levels of fusion sites and more notably at  T1-T2, progressed from 2014.  5. Increased reversal of cervical curvature centered at C4-5.  6. Left facet hypertrophy contributing to severe left C2-3, C3-4 and C4-5  foraminal stenoses.         Lumbar spine MRI from 04/20/2017 was personally reviewed with the Pt and demonstrated:  IMPRESSION:  Levoscoliosis. L3-4: Moderate central stenosis. Moderate posterior annular bulge. Grade 1 anterior degenerative listhesis. No evidence of advanced foraminal narrowing, lateral recess compromise, or advanced facet arthropathy.       Cervical spine MRI from 04/22/2014 was personally reviewed  and demonstrated:  Results from Hospital Encounter on 04/22/14   MRI CERV SPINE WO CONT     Narrative EXAM: MRI CERV SPINE WO CONT    INDICATION: Neck pain    COMPARISON: None. TECHNIQUE: MR imaging of the cervical spine was performed including sagittal  T1, T2, STIR; axial GRE, T2, T1. Contrast was not administered. FINDINGS:  Evidence of prior surgery with partial osseous fusion of C5-C6 and C6-C7. Please going with surgical history. Reversal of the cervical lordosis with apex  at C5-C6. Focal kyphosis and partial osseous fusion of C5-C6 with anterior  wedging of C6 vertebral body. Remainder vertebral bodies maintain normal  height. Slight anterior listhesis of C3 on C4. Mild degenerative discogenic  disease C3-C4, C4-C5 and C7-T1. The craniocervical junction is intact. The  course, caliber, and signal intensity of the spinal cord are normal.    C2/3: Mild disc osteophyte complex. Marked left and mild right facet  arthropathy. Moderate to severe left foraminal stenosis. There is posterior  ligamentous hypertrophy. Mild to moderate narrowing of the central canal, AP  diameter is 7.7 mm.    C3/4: Marked left and mild right facet hypertrophy. 8mm cystic structure just  lateral to the left facet joint image 18 axial T2 may represent facet synovial  cyst. Severe left foraminal stenosis. There is posterior ligamentous  hypertrophy. Moderate narrowing of the central canal, AP diameter is 7.1 mm. Dorsal and ventral CSF are effaced. C4/5: Disc osteophyte complex. Moderate to severe left facet hypertrophy. Moderate left foraminal stenosis. Mild/moderate central canal stenosis, AP  diameter is 8 mm. C5/6: Central canal and neural foramen are patent. C6/7: Central canal and neural foramen are patent. C7/T1: Central canal and neural foramen are patent.              Impression IMPRESSION:  1. Disc osteophyte complex and marked left facet arthropathy C2-C3 through  C4-C5 resulting in significant left foraminal stenosis and mild to moderate  central canal stenosis. 2. Postsurgical changes, detailed above. Please correlate with surgical history. Thank you for this referral.        Written by Army Taylor, as dictated by Neo Carmichael MD.  I, Dr. Neo Carmichael confirm that all documentation is accurate.

## 2019-11-26 NOTE — PATIENT INSTRUCTIONS
Neck Arthritis: Exercises  Introduction  Here are some examples of exercises for you to try. The exercises may be suggested for a condition or for rehabilitation. Start each exercise slowly. Ease off the exercises if you start to have pain. You will be told when to start these exercises and which ones will work best for you. How to do the exercises  Neck stretches to the side    1. This stretch works best if you keep your shoulder down as you lean away from it. To help you remember to do this, start by relaxing your shoulders and lightly holding on to your thighs or your chair. 2. Tilt your head toward your shoulder and hold for 15 to 30 seconds. Let the weight of your head stretch your muscles. 3. Repeat 2 to 4 times toward each shoulder. Chin tuck    1. Lie on the floor with a rolled-up towel under your neck. Your head should be touching the floor. 2. Slowly bring your chin toward your chest.  3. Hold for a count of 6, and then relax for up to 10 seconds. 4. Repeat 8 to 12 times. Active cervical rotation    1. Sit in a firm chair, or stand up straight. 2. Keeping your chin level, turn your head to the right, and hold for 15 to 30 seconds. 3. Turn your head to the left and hold for 15 to 30 seconds. 4. Repeat 2 to 4 times to each side. Shoulder blade squeeze    1. While standing, squeeze your shoulder blades together. 2. Do not raise your shoulders up as you are squeezing. 3. Hold for 6 seconds. 4. Repeat 8 to 12 times. Shoulder rolls    1. Sit comfortably with your feet shoulder-width apart. You can also do this exercise standing up. 2. Roll your shoulders up, then back, and then down in a smooth, circular motion. 3. Repeat 2 to 4 times. Follow-up care is a key part of your treatment and safety. Be sure to make and go to all appointments, and call your doctor if you are having problems.  It's also a good idea to know your test results and keep a list of the medicines you take.  Where can you learn more? Go to http://candice-abby.info/. Enter Q313 in the search box to learn more about \"Neck Arthritis: Exercises. \"  Current as of: June 26, 2019  Content Version: 12.2  © 5201-5146 [employer]. Care instructions adapted under license by [employer] (which disclaims liability or warranty for this information). If you have questions about a medical condition or this instruction, always ask your healthcare professional. [place] [address] any warranty or liability for your use of this information.

## 2019-11-26 NOTE — LETTER
11/28/19 Patient: Allyn Burkitt YOB: 1950 Date of Visit: 11/26/2019 Suman Guzmán DO 
[address] [address] [place] [address] VIA Facsimile: [phone] Dear Suman Guzmán DO, Thank you for referring Ms. Najma Garzon to [address] [place] [address] for evaluation. My notes for this consultation are attached. If you have questions, please do not hesitate to call me. I look forward to following your patient along with you. Sincerely, Ruddy Guzman MD

## 2019-11-27 ENCOUNTER — HOSPITAL ENCOUNTER (OUTPATIENT)
Dept: PHYSICAL THERAPY | Age: 69
End: 2019-11-27

## 2019-12-02 NOTE — PROGRESS NOTES
[facility]  [address] [facility] [address]  [place], [address]  [phone] fax    Physical Therapy Discharge Summary  Patient name: Dianne Addison Start of Care: 2019   Referral source: Bethany Cooper DO : 1950               Medical Diagnosis: Low back pain [M54.5]  Thoracic back pain [M54.6]  Payor: Ana Quinn / Plan: [place] MEDICARE PART A & B / Product Type: Medicare /  Onset Date:1 year               Treatment Diagnosis: LBP/SIJ dysfunction   Prior Hospitalization: see medical history Provider#: [id number]   Medications: Verified on Patient summary List    Comorbidities: scoliosis, cervical fusion with plate fixation, arthritis, rib removal right, coccyx removal   Prior Level of Function: Pt able to perform ADLs with less back pain and void of LE paresthesias  Visits from Start of Care: 4    Missed Visits: 0  Reporting Period : 2019 to 10/3/2019    Summary of Care:  Short Term Goals: To be accomplished in 2-3 weeks:  1. Pt will be I and compliant with HEP to promote self management of symptoms. Reports compliance 2019  2. Pt will demonstrate ability to attain neutral trunk posture without increase in back pain to improve mechanics with ADLs. flexed forward posture noted 10/3/2019  Long Term Goals: To be accomplished in 8 weeks:  1. Pt will demonstrate neutral pelvic alignment in frontal plane to improve lumbopelvic irritation with daily tasks. right upslip and posterior innominate noted today 10/3/2019  2. Pt will improve left hip strength to 4/5 for improved stability with ADLs and reduced back pain. Reports spasms in the left buttock with PPT marches with tband today 10/3/2019  3. Pt will report ability to sit for 1 hour without increase in pain to allow return to Tao[employer] services. Reports limited activity tolerance 10/3/2019  4. Pt will improve FOTO score to 61 to demonstrate improved quality of life and function.  Assess at MD note 10/3/2019    ASSESSMENT/RECOMMENDATIONS: Pt has requested hold from PT per her MD orders since 10/7. She is still completing further workup at this time and will be D/C without ability to further assess goals.     [x]Discontinue therapy: []Patient has reached or is progressing toward set goals      [x]Patient is non-compliant or has abdicated      []Due to lack of appreciable progress towards set goals    Prashant KHANT, CMTPT 12/2/2019 10:27 AM

## 2019-12-04 ENCOUNTER — DOCUMENTATION ONLY (OUTPATIENT)
Dept: ORTHOPEDIC SURGERY | Age: 69
End: 2019-12-04

## 2019-12-04 NOTE — PROGRESS NOTES
Order and office notes faxed to [facility] [address] [facility] in [address] to have them set up [address] [facility] . They will call our patient with the appointment and call for insurance authorization if needed.

## 2019-12-05 ENCOUNTER — TELEPHONE (OUTPATIENT)
Dept: ORTHOPEDIC SURGERY | Age: 69
End: 2019-12-05

## 2019-12-05 ENCOUNTER — DOCUMENTATION ONLY (OUTPATIENT)
Dept: ORTHOPEDIC SURGERY | Age: 69
End: 2019-12-05

## 2019-12-05 DIAGNOSIS — Z01.812 PRE-PROCEDURE LAB EXAM: Primary | ICD-10-CM

## 2019-12-05 DIAGNOSIS — R26.9 GAIT ABNORMALITY: ICD-10-CM
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
## 2019-12-05 DIAGNOSIS — R29.898 RIGHT ARM WEAKNESS: ICD-10-CM

## 2019-12-05 DIAGNOSIS — M48.02 CERVICAL SPINAL STENOSIS: Primary | ICD-10-CM

## 2019-12-05 NOTE — PROGRESS NOTES
Spoke with patient and she questioned me about her referral to Dr. Broderick Daley. I told her that the referral had been processed and gave her the # to call. She also asked about the lab test she was to have prior to her going for her MRI. I asked Mikael Early about this and she gave the VO for a BUN and Creatinine. That is all that is required from the [address] in . Patient will go to [facility] and have labs done and then she will go to have MRI completed.

## 2019-12-05 NOTE — TELEPHONE ENCOUNTER
Patient called as the [address] in [place] yesterday faxed over a request for blood work which they require due to the type of CT being done. Please check on fax and have blood work order done and call patient to come get order for blood work.   Patient can be reached at [phone]

## 2019-12-06 ENCOUNTER — HOSPITAL ENCOUNTER (OUTPATIENT)
Dept: LAB | Age: 69
Discharge: HOME | End: 2019-12-06
Payer: MEDICARE

## 2019-12-06 DIAGNOSIS — Z01.812 PRE-PROCEDURE LAB EXAM: ICD-10-CM

## 2019-12-06 LAB
BUN SERPL-MCNC: 16 MG/DL (ref 7–18)
CREAT SERPL-MCNC: 0.82 MG/DL (ref 0.6–1.3)

## 2019-12-06 PROCEDURE — 84520 ASSAY OF UREA NITROGEN: CPT

## 2019-12-06 PROCEDURE — 82565 ASSAY OF CREATININE: CPT

## 2019-12-06 PROCEDURE — 36415 COLL VENOUS BLD VENIPUNCTURE: CPT

## 2019-12-06 NOTE — TELEPHONE ENCOUNTER
Lab order placed as per NP Breathitt. Returned call to patient, verified Name/, informed patient of above. Per patient, she called and spoke with Deven Wiley in scheduling. Per patient, Deven Wiley was able to get her lab work ordered, per patient, Deven Wiley informed her to go to a [facility] facility to have lab work done. Per patient, she appreciated the call, however everything has been taken care of. Labs as per NP Breathitt were canceled. No further action required at this time.

## 2019-12-10 ENCOUNTER — DOCUMENTATION ONLY (OUTPATIENT)
Dept: ORTHOPEDIC SURGERY | Age: 69
End: 2019-12-10

## 2019-12-10 NOTE — PROGRESS NOTES
Order and office notes faxed to [address] in [place] to have them set up [address] [place] [address] . They will call our patient with the appointment and call for insurance authorization if needed.

## 2019-12-26 ENCOUNTER — OFFICE VISIT (OUTPATIENT)
Dept: NEUROLOGY | Age: 69
End: 2019-12-26

## 2019-12-26 VITALS
OXYGEN SATURATION: 96 % | HEART RATE: 92 BPM | WEIGHT: 180.6 LBS | HEIGHT: 69 IN | SYSTOLIC BLOOD PRESSURE: 142 MMHG | TEMPERATURE: 97.5 F | RESPIRATION RATE: 20 BRPM | DIASTOLIC BLOOD PRESSURE: 84 MMHG | BODY MASS INDEX: 26.75 KG/M2

## 2019-12-26 DIAGNOSIS — G81.11 RIGHT SPASTIC HEMIPARESIS (HCC): ICD-10-CM

## 2019-12-26 DIAGNOSIS — R26.9 GAIT DISORDER: Primary | ICD-10-CM

## 2019-12-26 NOTE — LETTER
12/26/19 Patient: Sourav Szymanski YOB: 1950 Date of Visit: 12/26/2019 Ethan Canales DO 
[address] [address] [place] [address] VIA Facsimile: [phone] Karis Francisco, [address] [facility] [address] [place] [address] VIA In Basket Dear DO Karis Rapp MD, Thank you for referring Ms. Trever Montenegro to [facility] for evaluation. My notes for this consultation are attached. If you have questions, please do not hesitate to call me. I look forward to following your patient along with you.  
 
 
Sincerely, 
 
Rakesh Gibbs MD
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
## 2019-12-26 NOTE — PROGRESS NOTES
Dianne Addison is a 68 y.o. female new patient in today to discuss weakness of right arm and leg, frequent falls and gait abnormality; as referred by Tadeo Benton MD.

## 2019-12-27 ENCOUNTER — TELEPHONE (OUTPATIENT)
Dept: ORTHOPEDIC SURGERY | Age: 69
End: 2019-12-27

## 2019-12-27 DIAGNOSIS — M48.02 CERVICAL SPINAL STENOSIS: Primary | ICD-10-CM

## 2019-12-27 RX ORDER — TIZANIDINE 2 MG/1
2 TABLET ORAL
Qty: 45 TAB | Refills: 0 | Status: SHIPPED | OUTPATIENT
Start: 2019-12-27 | End: 2020-03-16 | Stop reason: ALTCHOICE

## 2019-12-27 NOTE — TELEPHONE ENCOUNTER
Reviewed C MRI, it looks stable no spinal cord deformities or compressions. However, neurology is concerned for some cervical myelopathy. However,  Dr. Leonardo Rodriguez not did not review her MRI    The apt on 1/06 w/ Dr Mamadou Gallego is actually one day sooner than when Dr. Amadou Adams could see her, she should follow up as scheduled as that's the soonest apt

## 2019-12-27 NOTE — TELEPHONE ENCOUNTER
Returned call to patient, verified Name/, informed patient of below. Patient is requesting a muscle relaxer to assist with muscle spasms until she can get in with PT. Patient aware due to her age, we are unable to prescribe flexeril. Patient is also requesting new order for PT, as it will need to be renewed from previous order placed on 19 by Dr. Tho Ovalles. PT referral pended as previously written. Please review/advise.

## 2019-12-27 NOTE — TELEPHONE ENCOUNTER
Returned call to patient, verified Name/, informed patient of below message per CHAR Dan. Patient verbalized agreement/understanding. However patient was unclear as to why Dr. Onelia Cantor was being considered, as patient is now concerned we are implying she is going to need surgery. Again informed patient, her MRI of her neck appears stable, no compression or deformities of the spinal cord as per CHAR Dan, informed patient Dr. Ernestina Ro will further review this with her at her follow up appointment. Patient then asked what she is supposed to do about the muscle spasms and numbness she is having down her arms. Per patient she is no longer taking any muscle relaxers, as well has still not started her PT. Per patient she wanted to wait until after her appointment with Dr. Ernestina Ro to see what Dr. Ernestina Ro thinks about the numbness down her arms and spasms. Informed patient, with MRI not showing any compression or deformities of spinal cord, as well as numbness/muscle spasms she is complaining of, PT/Dry Needling could help. Explained to patient, muscle tightness/pain/spasms, can also cause numbness/tingling/burning pain in the arms. Informed patient PT/Dry Needling could help with this pain. Patient would like to clarify is she should go ahead and start PT prior to her MRI f/u appt. Please review and advise.

## 2019-12-27 NOTE — TELEPHONE ENCOUNTER
Returned call to patient, verified Name/, informed patient of below. Patient verbalized agreement/understanding. No further action required at this time.

## 2019-12-31 ENCOUNTER — HOSPITAL ENCOUNTER (OUTPATIENT)
Dept: PHYSICAL THERAPY | Age: 69
Discharge: HOME | End: 2019-12-31
Payer: MEDICARE

## 2019-12-31 PROCEDURE — 97162 PT EVAL MOD COMPLEX 30 MIN: CPT

## 2019-12-31 NOTE — PROGRESS NOTES
[facility]  [address]  [place], [address]  [phone] fax    Plan of Care/ Statement of Necessity for Physical Therapy Services    Patient name: Toni Morrell Start of Care: 2019   Referral source: Marcia Krishnamurthy NP : [id number]    Medical Diagnosis: Neck pain [M54.2]  Low back pain [M54.5]  Payor: Sorin Many / Plan: [place] MEDICARE PART [place] & B / Product Type: Medicare /  Onset Date:6 months, gradually worsening over last 2-3 months    Treatment Diagnosis: neck and back pain and right UE and RLE weakness   Prior Hospitalization: see medical history Provider#: [id number]   Medications: Verified on Patient summary List    Comorbidities: 4 x neck surgeries including C5-7 fusion, another fusion of an unspecified level, another cervical surgery with hardware that pt was unsure of, C3-4 ant fusion, C5-6-7 fusion, 3 surgeries in right forearm including nerve involvement per pt, 2x right elbow surgeries, removed rib on right side, coccyx fracture, otherwise healthy per pt report   Prior Level of Function: independent and mobile without significant limitations      The Plan of Care and following information is based on the information from the initial evaluation. Assessment/ key information: Pt reports she has been seeing her physician for neck pain and back spasms and over the last 2-3 months she has had onset of numbness & tingling in her foot and arm and feels like she has \"a bowling ball on my legs when I walk\". She reports 6 months ago noting weakness in her right side, stiff neck and muscle spasms, and has been having frequent falls. She reports she has also been starting to get numbness and tingling in her left leg and hand in the last few weeks. She reports she has constant back spasms as well that worsen with activity. She reports if she is reclining or if she leans forward she feels better, but if she stands up its worse.  She also notes she is dragging her right foot when walking and that her weakness has been steadily getting worse and that she is limited in her ability to walk as she feels like her legs are \"not a part\" of her. She reports she has been referred for a CT & MRI of her neck and back, but is still awaiting the MRI results. At this time, pt presents with diffuse weakness througout her right upper and lower extremities with associated deficits in gait with potentially mild ataxic gait, diminished sensation primarily in the RLE but intermittent in the LLE and RUE, 3+ bilat triceps & right gastroc deep tendon reflex, very poor global cervical ROM, limited trunk ROM in all planes, and reports of neck pain provocation with bilat LE slump tests. Given pt's presentation as well as her reports of bilateral symptoms, progressive weakness, and involved cervical spine surgical hx, she has been advised to hold PT treatment at this time and immediately follow up with her spine specialist for further assessment (pt reports she has a visit scheduled on 1/6/2019 and was unable to obtain an earlier visit).      Evaluation Complexity History HIGH Complexity :3+ comorbidities / personal factors will impact the outcome/ POC ; Examination HIGH Complexity : 4+ Standardized tests and measures addressing body structure, function, activity limitation and / or participation in recreation  ;Presentation MEDIUM Complexity : Evolving with changing characteristics  ; Clinical Decision Making MEDIUM Complexity : FOTO score of 26-74  Overall Complexity Rating: MEDIUM  Problem List: pain affecting function, decrease ROM, decrease strength, impaired gait/ balance, decrease ADL/ functional abilitiies, decrease activity tolerance, decrease flexibility/ joint mobility and decrease transfer abilities   Treatment Plan may include any combination of the following: Therapeutic exercise, Therapeutic activities, Neuromuscular re-education, Physical agent/modality, Gait/balance training, Manual therapy, Aquatic therapy, Patient education and Self Care training  Patient / Family readiness to learn indicated by: asking questions, trying to perform skills and interest  Persons(s) to be included in education: patient (P)  Barriers to Learning/Limitations: None  Patient Goal (s): Improvement, pain and mobility.   Patient Self Reported Health Status: good  Rehabilitation Potential: poor    Short Term Goals: To be accomplished in NA   NA    Frequency / Duration: Patient to be seen 1 time per week for 1 treatment. Will assess for further treatment pending pt f/u with her physician. Patient/ Caregiver education and instruction: Diagnosis, prognosis, self care, activity modification and exercises   [x]  Plan of care has been reviewed with PTA    Certification Period: 12/31/2019 - 1/29/2019  Delilah Tilley PT, DPT, ATC 12/31/2019 4:55 PM    ________________________________________________________________________    I certify that the above Therapy Services are being furnished while the patient is under my care. I agree with the treatment plan and certify that this therapy is necessary.     [de-identified] Signature:____________________  Date:____________Time: _________    Please sign and return to [facility]  [address] [facility] [address]  [place], [address]  [phone] fax

## 2019-12-31 NOTE — PROGRESS NOTES
PT DAILY TREATMENT NOTE/LUMBAR EVAL 10-18    Patient Name: Jose Zaragoza  Date:2019  : 1950  [x]  Patient  Verified  Payor: [place] MEDICARE / Plan: [place] MEDICARE PART A & B / Product Type: Medicare /    In time:11:17am  Out time:12:25pm  Total Treatment Time (min): 76  Visit #: 1 of 1    Medicare/BCBS Only   Total Timed Codes (min):  00 1:1 Treatment Time:  68     Treatment Area: Neck pain [M54.2]  Low back pain [M54.5]  SUBJECTIVE  Pain Level (0-10 scale): 4/10  [x]constant []intermittent []improving [x]worsening []no change since onset    Any medication changes, allergies to medications, adverse drug reactions, diagnosis change, or new procedure performed?: [x] No    [] Yes (see summary sheet for update)  Subjective functional status/changes:     Pt reports she has been seeing her physician for neck pain and back spasms and over the last 2-3 months she has had onset of numbness & tingling in her foot and arm and feels like she has \"a bowling ball on my legs when I walk\". She reports 6 months ago noting weakness in her right side, stiff neck and muscle spasms, and has been having frequent falls. She reports she has also been starting to get numbness and tingling in her left leg and hand in the last few weeks. She reports she has constant back spasms as well that worsen with activity. She reports if she is reclining or if she leans forward she feels better, but if she stands up its worse. She also notes she is dragging her right foot when walking and that her weakness has been steadily getting worse and that she is limited in her ability to walk as she feels like her legs are \"not a part\" of her. She reports she has been referred for a CT & MRI of her neck and back, but is still awaiting the MRI results. She reports she saw a neurologist as well recently who advised her she had a \"cervical neuropathy\" per pt report and that there was nothing they could do.  She reports neurology thought she may have scar tissue pressing on her spinal cord and also referred her to PT. She reports she had been DC from PT and placed on hold per her MD pending cervical MRI and review previously in November 2019. She reports she had the MRI of her neck, but has yet to f/u with her physician as her physician is on vacation. She does have a f/u with Dr. Wright on 1/6/2019, but had called her office for pain relief for her neck and back and was referred to PT this past week per the nurse practitioner there. Denies saddle paresthesias, bowel/bladder changes, night pain.     PLOF: independent and mobile without significant limitations  Limitations to PLOF: difficulty reaching into cabinets, getting up from a chair, tripping and falling frequently, dragging right foot, some difficulty grasping with right hand, difficulty with sensation  Mechanism of Injury: insidious gradual onset  Current symptoms/Complaints: see above  Previous Treatment/Compliance: muscle relaxers  PMHx/Surgical Hx: 4 x neck surgeries including C5-7 fusion, another fusion of an unspecified level, another cervical surgery with hardware that pt was unsure of, C3-4 ant fusion, C5-6-7 fusion, 3 surgeries in right forearm including nerve involvement per pt, 2x right elbow surgeries, removed rib on right side, otherwise healthy per pt report  Work Hx: see intake  Living Situation:   Pt Goals: see intake  Barriers: []pain []financial []time []transportation []other  Motivation: appears motivated and cooperative  Substance use: []Alcohol []Tobacco []other:   FABQ Score: []low []elevate  Cognition: A & O x 4    Other:    OBJECTIVE/EXAMINATION  Domestic Life:   Activity/Recreational Limitations:   Mobility:   Self Care:     68 min [x]Eval                  []Re-Eval           With   [] TE   [] TA   [] neuro   [] other: Patient Education: [x] Review HEP    [] Progressed/Changed HEP based on:   [] positioning   [] body mechanics   [] transfers   [] heat/ice application    [] other: Other Objective/Functional Measures:    Physical Therapy Evaluation - Lumbar Spine ([facility])    SUBJECTIVE  Chief Complaint: neck pain, back pain, progressing weakness in primarily the right arm and leg    Mechanism of injury:    Symptoms:  Aggravated by:   [] Bending [] Sitting [x] Standing [] Walking   [] Moving [] Cough [] Sneeze [] Valsalva   [] AM  [] PM  Lying:  [] sup   [] pro   [] sidelying   [] Other:     Eased by:    [x] Bending [] Sitting [] Standing [] Walking   [] Moving [] AM  [] PM  Lying: [] sup  [] pro  [] sidelying   [] Other:     General Health:  Red Flags Indicated? [] Yes    [] No  [] Yes [x] No Recent weight change (If yes, due to dieting?  [] Yes  [] No)   [x] Yes [] No Weakness in legs during walking  [] Yes [x] No Unremitting pain at night  [] Yes [x] No Abdominal pain or problems  [] Yes [x] No Rectal bleeding  [] Yes [x] No Feet more cold or painful in cold weather  [] Yes [x] No Menstrual irregularities  [] Yes [x] No Blood or pain with urination  [] Yes [x] No Dysfunction of bowel or bladder  [] Yes [x] No Recent illness within past 3 weeks (i.e, cold, flu)  [] Yes [x] No Numbness/tingling in buttock/genitalia region    Past History/Treatments:     Diagnostic Tests: [] Lab work [] X-rays    [x] CT [x] MRI     [] Other:  Results: CT results in Epic, MRI results pending    Functional Status  Prior level of function:  Present functional limitations:  What position do you sleep in?:    OBJECTIVE  Posture:  Lateral Shift: [] R    [] L     [] +  [] -  Kyphosis: [x] Increased [] Decreased   []  WNL  Lordosis:  [] Increased [] Decreased   [] WNL  Pelvic symmetry: [] WNL    [] Other:     Flexed forward head posture    Gait:  [] Normal     [x] Abnormal: WIDE EDA, slow gait pattern, mild instability and gait path deviation, decreased step length, decreased foot clearance on right with mild foot drag intermittently    Active Movements: [] N/A   [] Too acute   [] Other:  ROM % AROM % PROM Comments:pain, area   Forward flexion 40-60 Flexion fingers to ankles  Relief of back pain reported   Extension 20-30 Neutral, minimal extension  Increased back discomfort   SB right 20-30 NT TC     SB left 20-30 NT TC     Rotation right 5-10 20%  Increased back and neck pain   Rotation left 5-10 20%  Increased back and neck pain     shoulder elevation AROM:  Right 95, left 133    C/S AROM: 10 degrees rotation bilat, SB ~10 degrees bilat, c/s ext to -5, flexion to 50 with relief of neck pain, special testing deferred 2' concerns regarding cervical myelopathy vs radiculopathy    Repeated Movements   Effects on present pain: produces (W[place]), abolishes (A), increases (incr), decreases (decr), centralizes (C), peripheral (PH), no effect (NE)   Pre-Test Sx Flexion Repeated Flexion Extension Repeated Extension Repeated SBL Repeated SBR   Sitting          Standing          Lying      N/A N/A   Comments:  Side Glide:  Sustained passive positioning test:    Neuro Screen [] WNL  Myotome/Dermatome/Reflexes:  Comments: triceps 3+ bilat, biceps 2+, brachioradialis 1+ bilat, patellar DTR 3+ bilat, achilles 2+ left, 3+ right    (-) babinski, (-) talley's     Strength: right 1 lb, left 20 lb  Wrist ext: right 3/5, left 5/5  Wrist flex: right 3/5, left 5/5  Elbow flexion: right 3+/5, left 4+/5  Elbow ext: right 3+/5, left 4+/5  Shoulder flexion: right 3-/5, left 3+/5  Shoulder ER: right 2+/5, left 4-/5    Diffuse diminished sensation to light touch in right distal UE and RLE below the knee, intermittent diminished sensation in LLE below knee    Dural Mobility:  SLR Sitting: [] R    [] L    [] +    [x] -  @ (degrees):           Supine: [] R    [] L    [] +    [] -  @ (degrees):   Slump Test: [] R    [] L    [] +    [x] -  @ (degrees): however, pt reports provocation of neck discomfort with slump test performance bilaterally with only movement of LE into knee ext  Prone Knee Bend: [] R    [] L    [] +    [x] -     Palpation  [] Min [x] Mod  [] Severe    Location: left levator scapulae, upper trapezius, middle trapezius & rhomboids, thoracic paraspinals on left down towards upper lumbar paraspinals with notable increased tone on left compared to right  [] Min  [] Mod  [] Severe    Location:  [] Min  [] Mod  [] Severe    Location:    Strength: LE   L(0-5) R (0-5) N/T   Hip Flexion (L1,2) 4 3 []   Knee Extension (L3,4) 5 4 []   Ankle Dorsiflexion (L4) 5 3+ []   Great Toe Extension (L5) 4 3 []   Ankle Plantarflexion (S1) 4 3 []   Knee Flexion (S1,2) 4 3 []   Upper Abdominals   []   Lower Abdominals   []   Paraspinals   []   Back Rotators NT NT []   Gluteus Juan Carlos NT NT []   Other   []     UE strength: Right, left     Special Tests  Lumbar:  Lumb. Compression: [] Pos  [] Neg               Lumbar Distraction:   [] Pos  [] Neg    Quadrant:  [] Pos  [] Neg   [] Flex  [] Ext    Sacroilliac:  Gaenslen's: [] R    [] L    [] +    [] -     Compression: [] +    [] -     Gapping:  [] +    [] -     Thigh Thrust: [] R    [] L    [] +    [] -     Leg Length: [] +    [] -   Position:    Crests:    ASIS:    PSIS:    Sacral Sulcus:    Mobility: Standing flex:     Sitting flex:     Supine to sit:     Prone knee bend:         Hip: Lenda Rustam:  [] R    [] L    [] +    [] -     Scour:  [] R    [] L    [] +    [] -     Piriformis: [] R    [] L    [] +    [] -          Deficits: Karen's: [] R    [] L    [] +    [] -     El: [] R    [] L    [] +    [] -     Hamstrings 90/90:    Gastrocsoleus (to neutral): Right: Left:       Global Muscular Weakness:  Abdominals:  Quadratus Lumborum:  Paraspinals: Other:    Other tests/comments:  Deferred some examination given concerns regarding cervical involvement and potential for myelopathy     Pain Level (0-10 scale) post treatment: 4/10    ASSESSMENT/Changes in Function: Per POC.     Patient will continue to benefit from skilled PT services to modify and progress therapeutic interventions, address functional mobility deficits, address ROM deficits, address strength deficits, analyze and address soft tissue restrictions, analyze and cue movement patterns, address imbalance/dizziness and instruct in home and community integration to attain remaining goals. [x]  See Plan of Care  []  See progress note/recertification  []  See Discharge Summary         Progress towards goals / Updated goals:  Per POC. PLAN  []  Upgrade activities as tolerated     []  Continue plan of care  []  Update interventions per flow sheet       []  Discharge due to:_  [x]  Other:_Pt advised to hold PT 2' concerns regarding right sided weakness and reports of gradually progressing weakness. She has a f/u with her spine specialist on 1/6/2019 who's office has been called and advised, requested to return our call, and patient was advised to f/u sooner if able.     Nusrat Jerome, PT, DPT, ATC 12/31/2019  11:18 AM

## 2020-01-06 ENCOUNTER — OFFICE VISIT (OUTPATIENT)
Dept: ORTHOPEDIC SURGERY | Age: 70
End: 2020-01-06

## 2020-01-06 VITALS
SYSTOLIC BLOOD PRESSURE: 160 MMHG | WEIGHT: 143 LBS | RESPIRATION RATE: 16 BRPM | BODY MASS INDEX: 21.18 KG/M2 | DIASTOLIC BLOOD PRESSURE: 87 MMHG | HEIGHT: 69 IN | OXYGEN SATURATION: 99 % | TEMPERATURE: 97.3 F | HEART RATE: 101 BPM

## 2020-01-06 DIAGNOSIS — M47.812 CERVICAL FACET JOINT SYNDROME: Primary | ICD-10-CM

## 2020-01-06 DIAGNOSIS — M79.18 CERVICAL MYOFASCIAL PAIN SYNDROME: ICD-10-CM

## 2020-01-06 DIAGNOSIS — M79.18 MYOFASCIAL PAIN: ICD-10-CM

## 2020-01-06 DIAGNOSIS — M51.26 HNP (HERNIATED NUCLEUS PULPOSUS), LUMBAR: ICD-10-CM

## 2020-01-06 DIAGNOSIS — G57.92 NEURITIS OF LEFT LOWER EXTREMITY: ICD-10-CM

## 2020-01-06 RX ORDER — KETOROLAC TROMETHAMINE 15 MG/ML
30 INJECTION, SOLUTION INTRAMUSCULAR; INTRAVENOUS ONCE
Qty: 1 VIAL | Refills: 0
Start: 2020-01-06 | End: 2020-01-06

## 2020-01-06 RX ORDER — CELECOXIB 200 MG/1
200 CAPSULE ORAL
Qty: 60 CAP | Refills: 1 | Status: SHIPPED | OUTPATIENT
Start: 2020-01-06 | End: 2020-04-30

## 2020-01-06 NOTE — PROGRESS NOTES
[facility]  [address] [place] [address], [facility] [address]  Phone: [phone]  Fax: [phone]    Pt's YOB: 1950    ASSESSMENT   Diagnoses and all orders for this visit:    1. Cervical facet joint syndrome  -     REFERRAL TO PAIN MANAGEMENT  -     celecoxib (CELEBREX) 200 mg capsule; Take 1 Cap by mouth two (2) times daily as needed for Pain for up to 90 days.  -     KETOROLAC TROMETHAMINE INJ  -     ketorolac (TORADOL) 15 mg/mL soln injection; 2 mL by IntraMUSCular route once for 1 dose. -     NV THER/PROPH/DIAG INJECTION, SUBCUT/IM    2. HNP (herniated nucleus pulposus), lumbar  -     REFERRAL TO PAIN MANAGEMENT  -     gabapentin (NEURONTIN) 300 mg capsule; Take 1 Cap by mouth daily AND 2 Caps every evening. Max Daily Amount: 900 mg.    3. Neuritis of left lower extremity  -     REFERRAL TO PAIN MANAGEMENT  -     gabapentin (NEURONTIN) 300 mg capsule; Take 1 Cap by mouth daily AND 2 Caps every evening. Max Daily Amount: 900 mg.    4. Cervical myofascial pain syndrome  -     REFERRAL TO PAIN MANAGEMENT    5. Myofascial pain  -     REFERRAL TO PAIN MANAGEMENT         IMPRESSION AND PLAN:  Padmini Ray is a 68 y.o. female with history of cervical pain. Pt reports complete numbness in the right hand with tingling in the fingers on the left. She also complains of numbness in the toes in both feet and admits that her symptoms have progressed since her last office visit. Pt reports numbing pain radiating down the left side of her neck with cervical extension. 1) Pt was given information on cervical arthritis exercises. 2) Discussed treatment options with the patient including cervical facet injections, a RFA, and surgical intervention. 3) She was referred to Dr. Deepika Vela for cervical facet injection evaluation. 4) Pt was prescribed Celebrex 200 mg 1 tab BID prn.  5) She received a refill of Neurontin 300 mg 1 tab QAM and 2 tabs QHS.    6) I recommended she try moist heat. 7) Ms. Castillo Amaya has a reminder for a \"due or due soon\" health maintenance. I have asked that she contact her primary care provider, Slim Sanchez DO, for follow-up on this health maintenance. 8)  demonstrated consistency with prescribing. 9) Consider advanced imaging studies including brain MRI also discussed pending response to injections and medication  Follow-up and Dispositions    · Return in about 6 weeks (around 2/17/2020) for Medication follow up, cervical facet injection. HISTORY OF PRESENT ILLNESS:  Alexandra Banda is a 68 y.o. female with history of cervical pain and presents to the office today for MRI follow up. Pt reports complete numbness in the right hand with tingling in the fingers on the left. She also complains of numbness in the toes in both feet and admits that her symptoms have progressed since her last office visit. Pt reports numbing pain radiating down the left side of her neck with cervical extension. Pt also complains of pain extending into the upper/middle back. She notes that she drags the left foot often with ambulation and often leans forward when walking. Pt also reports issues with balance and notes that she often stays home since she is afraid people will think she is drunk. She admits that she has had a number of significant falls and this seems to have triggered her symptoms. Pt admits that her pain was so severe on Saturday night, 01/04/2020, that it brought tears to her eyes. She followed up with Dr. Dami Guzmán and he suspected that some of weakness she is experiencing is due to spinal cord impingement prior to her previous cervical surgery. Pt denies any previous cervical injections. She notes that she is not interested in surgical intervention at this time. Pt previously attended physical therapy but when she asked about dry needling she was told by her therapist that they never received orders for it.  She notes improvement when taking prednisone but notes that her pain gradually returned. Pt is currently taking Neurontin 300 mg 1 tab QAM and 2 tabs QHS. She is not on NSAID's and denies a history of renal disease. Pt desires to proceed with cervical injections, medication evaluation. 25 minutes of face to face contact was spent with the patient during today's office visit extensively discussing her symptoms, reviewing her cervical MRI, consult with Dr Nikolas Fuentes, therapy experience, indications for surgery, cervical injections and treatment plan.     Pain Scale: 6/10    PCP: Carmen Barraza DO     Past Medical History:   Diagnosis Date    Arthritis     rheumatoid arthritis    Chronic pain     Ill-defined condition     polymyalgia        Social History     Socioeconomic History    Marital status:      Spouse name: Not on file    Number of children: Not on file    Years of education: Not on file    Highest education level: Not on file   Occupational History    Not on file   Social Needs    Financial resource strain: Not on file    Food insecurity:     Worry: Not on file     Inability: Not on file    Transportation needs:     Medical: Not on file     Non-medical: Not on file   Tobacco Use    Smoking status: Never Smoker    Smokeless tobacco: Never Used   Substance and Sexual Activity    Alcohol use: No    Drug use: No    Sexual activity: Never   Lifestyle    Physical activity:     Days per week: Not on file     Minutes per session: Not on file    Stress: Not on file   Relationships    Social connections:     Talks on phone: Not on file     Gets together: Not on file     Attends Druze service: Not on file     Active member of club or organization: Not on file     Attends meetings of clubs or organizations: Not on file     Relationship status: Not on file    Intimate partner violence:     Fear of current or ex partner: Not on file     Emotionally abused: Not on file     Physically abused: Not on file Forced sexual activity: Not on file   Other Topics Concern    Not on file   Social History Narrative    Not on file       Current Outpatient Medications   Medication Sig Dispense Refill    celecoxib (CELEBREX) 200 mg capsule Take 1 Cap by mouth two (2) times daily as needed for Pain for up to 90 days. 60 Cap 1    tiZANidine (ZANAFLEX) 2 mg tablet Take 1 Tab by mouth three (3) times daily as needed for Pain. 45 Tab 0    gabapentin (NEURONTIN) 300 mg capsule Take 1 Cap by mouth daily AND 2 Caps every evening. Max Daily Amount: 900 mg. 90 Cap 1    DULoxetine (CYMBALTA) 60 mg capsule Take 1 Cap by mouth daily.  sertraline (ZOLOFT) 25 mg tablet Take 1 Tab by mouth daily. 0    pramipexole (MIRAPEX) 0.5 mg tablet Take 0.5 mg by mouth nightly.  pravastatin (PRAVACHOL) 80 mg tablet Take 80 mg by mouth nightly.  ibuprofen (ADVIL) 200 mg tablet Take 600 mg by mouth every eight (8) hours as needed.  multivitamin (ONE A DAY) tablet Take 1 Tab by mouth daily.  cholecalciferol, vitamin D3, (VITAMIN D3) 2,000 unit tab Take 1 Tab by mouth daily.  Omega-3 Fatty Acids (FISH OIL) 500 mg cap Take 1 Cap by mouth daily.  ibuprofen-famotidine (DUEXIS) 800-26.6 mg tab Take 1 Tab by mouth three (3) times daily as needed.  metaxalone (SKELAXIN) 800 mg tablet Take 1 Tab by mouth every evening.  diclofenac (VOLTAREN) 1 % gel Apply 4 g to affected area four (4) times daily. Apply 4 gm to left knee qid for osteoarthritis 5 Each 1       Allergies   Allergen Reactions    Doxycycline Other (comments)     Lower blood sugars    Statins-Hmg-Coa Reductase Inhibitors Other (comments)         REVIEW OF SYSTEMS    Constitutional: Negative for fever, chills, or weight change. Respiratory: Negative for cough or shortness of breath. Cardiovascular: Negative for chest pain or palpitations. Gastrointestinal: Negative for acid reflux, change in bowel habits, or constipation.   Genitourinary: Negative for dysuria and flank pain. Musculoskeletal: Positive for cervical pain and right arm/leg weakness. Skin: Negative for rash. Neurological: Negative for headaches or dizziness. Positive for numbness in the hands and feet. Endo/Heme/Allergies: Negative for increased bruising. Psychiatric/Behavioral: Positive for difficulty with sleep. PHYSICAL EXAMINATION  Visit Vitals  /87 (BP 1 Location: Left arm, BP Patient Position: Sitting)   Pulse (!) 101   Temp 97.3 °F (36.3 °C)   Resp 16   Ht 5' 8.5\" (1.74 m)   Wt 143 lb (64.9 kg)   SpO2 99%   BMI 21.43 kg/m²       Constitutional: Awake, alert, and in no acute distress. Neurological: 1+ symmetrical DTRs in the upper extremities. 1+ symmetrical DTRs in the lower extremities. Sensation to light touch is intact. Negative Lee's sign bilaterally. Skin: warm, dry, and intact. Musculoskeletal: Tight across the upper trapezius bilaterally with scattered trigger points. Very limited range of motion and pain with side to side cervical flexion. No pain with cervical forward flexion. Pain with cervical extension. No pain with extension, axial loading, or forward flexion. No pain with internal or external rotation of her hips. Negative straight leg raise bilaterally. Biceps  Triceps Deltoids Wrist Ext Wrist Flex Hand Intrin   Right  +3 to -4/5   +3 to -4/5  +3 to -4/5  +3 to -4/5  +3 to -4/5  +3 to -4/5   Left +4/5 +4/5 +4/5 +4/5 +4/5 +4/5      Hip Flex  Quads Hamstrings Ankle DF EHL Ankle PF   Right  +3 to -4/5  +3 to -4/5  +3 to -4/5  +3 to -4/5  +3 to -4/5  +3 to -4/5   Left +4/5 +4/5 +4/5 +4/5 +4/5 +4/5     IMAGING:    Cervical spine MRI from 12/20/19 was personally reviewed with the patient and demonstrated:  IMPRESSION:  Prominent kyphotic deformity of the cervical spine. C2-3: Small central disc protrusion with moderate left foraminal narrowing and mild central stenosis. Advanced left facet arthropathy. C3-4: Solid anterior interbody fusion.  The left facet is fused. C4-5: Mild central stenosis. C5-6: Mild central stenosis. Mild central cord flattening. The disc space is fused. C6-7: Disc space is fused. C7-T1: Small right posterolateral disc protrusion. T1-T2: Mild-to-moderate posterior annular bulge and prominent Modic-type 1 changes. Cervical Spine CT from 11/05/2019 was personally reviewed with the Pt and demonstrated:  CT SCAN CERVICAL SPINE WITHOUT CONTRAST     HISTORY: Chronic back pain with multiple surgeries. Fall backwards 2 days ago. Left leg numbness, now with additional right leg numbness.     COMPARISON: MRI cervical spine 4/22/2014.     TECHNIQUE: Axial images through the cervical spine were obtained without  intravenous contrast. Coronal and sagittal reformatted images were also obtained  for better evaluation of regional anatomy and alignment.  All CT scans are  performed using dose optimization techniques as appropriate to the performed  exam including the following: Automated exposure control, adjustment of mA  and/or kV according to patient size, and use of iterative reconstructive  technique.      FINDINGS:     No acute fracture. No prevertebral soft tissue swelling. Reversal of cervical  curvature centered at C4-C5, similar but increased from 2014, likely chronic. Odontoid intact. Occipital condylar/C-1/C-2 relationships normal. Anterior C3-C4  fusion hardware has been placed. Mature bony fusion has occurred across the  C3-C4 vertebral body and left facet joint. Chronic, mature interbody fusion is  stable at C5-C6-C7. Severe facet hypertrophy at left C2-C3 and C4-C5. Degenerative disc space narrowing is mild between fusion sites at C4-C5 with  mild osteophytes. More severe degenerative disc space narrowing with endplate  sclerosis, subchondral cysts and osteophytes has newly developed at T1-T2.     There are no acute findings seen in the spinal canal within the limitations of  CT imaging.  No clear evidence of marked spinal stenosis. Severe foraminal  stenoses are present at left C2-C3, left C3-C4, left C4-C5.     IMPRESSION:     1. No CT signs of cervical spine trauma. 2. Chronic interbody fusion at C5-C7. 3. Interval anterior fusion hardware placement at C3-4 since 2384 without  complication. 4. Mild degenerative changes between levels of fusion sites and more notably at  T1-T2, progressed from 2014.  5. Increased reversal of cervical curvature centered at C4-5.  6. Left facet hypertrophy contributing to severe left C2-3, C3-4 and C4-5  foraminal stenoses.        Lumbar spine MRI from 04/20/2017 was personally reviewed with the Pt and demonstrated:  IMPRESSION:  Levoscoliosis. L3-4: Moderate central stenosis. Moderate posterior annular bulge. Grade 1 anterior degenerative listhesis. No evidence of advanced foraminal narrowing, lateral recess compromise, or advanced facet arthropathy. Written by Nessa Banegas, as dictated by Blayne Jorge MD.  I, Dr. Blayne Jorge confirm that all documentation is accurate.

## 2020-01-06 NOTE — LETTER
1/7/20 Patient: Padmini Litter YOB: 1950 Date of Visit: 1/6/2020 Carol Bullock DO 
[address] [id number] [place] [address] VIA Facsimile: [phone] Dear Carol Bullock DO, Thank you for referring Ms. Linda Gutierrez to [address] [place] [address] for evaluation. My notes for this consultation are attached. If you have questions, please do not hesitate to call me. I look forward to following your patient along with you. Sincerely, Lexi Price MD

## 2020-01-06 NOTE — PATIENT INSTRUCTIONS
Neck Arthritis: Exercises  Introduction  Here are some examples of exercises for you to try. The exercises may be suggested for a condition or for rehabilitation. Start each exercise slowly. Ease off the exercises if you start to have pain. You will be told when to start these exercises and which ones will work best for you. How to do the exercises  Neck stretches to the side    1. This stretch works best if you keep your shoulder down as you lean away from it. To help you remember to do this, start by relaxing your shoulders and lightly holding on to your thighs or your chair. 2. Tilt your head toward your shoulder and hold for 15 to 30 seconds. Let the weight of your head stretch your muscles. 3. Repeat 2 to 4 times toward each shoulder. Chin tuck    1. Lie on the floor with a rolled-up towel under your neck. Your head should be touching the floor. 2. Slowly bring your chin toward your chest.  3. Hold for a count of 6, and then relax for up to 10 seconds. 4. Repeat 8 to 12 times. Active cervical rotation    1. Sit in a firm chair, or stand up straight. 2. Keeping your chin level, turn your head to the right, and hold for 15 to 30 seconds. 3. Turn your head to the left and hold for 15 to 30 seconds. 4. Repeat 2 to 4 times to each side. Shoulder blade squeeze    1. While standing, squeeze your shoulder blades together. 2. Do not raise your shoulders up as you are squeezing. 3. Hold for 6 seconds. 4. Repeat 8 to 12 times. Shoulder rolls    1. Sit comfortably with your feet shoulder-width apart. You can also do this exercise standing up. 2. Roll your shoulders up, then back, and then down in a smooth, circular motion. 3. Repeat 2 to 4 times. Follow-up care is a key part of your treatment and safety. Be sure to make and go to all appointments, and call your doctor if you are having problems.  It's also a good idea to know your test results and keep a list of the medicines you take.  Where can you learn more? Go to http://candice-abby.info/. Enter R564 in the search box to learn more about \"Neck Arthritis: Exercises. \"  Current as of: June 26, 2019  Content Version: 12.2  © 5024-4689 [employer]. Care instructions adapted under license by [employer] (which disclaims liability or warranty for this information). If you have questions about a medical condition or this instruction, always ask your healthcare professional. [place] [address] any warranty or liability for your use of this information.

## 2020-01-07 ENCOUNTER — TELEPHONE (OUTPATIENT)
Dept: ORTHOPEDIC SURGERY | Age: 70
End: 2020-01-07

## 2020-01-07 RX ORDER — GABAPENTIN 300 MG/1
CAPSULE ORAL
Qty: 90 CAP | Refills: 3 | Status: SHIPPED | OUTPATIENT
Start: 2020-01-07 | End: 2020-04-28

## 2020-01-07 NOTE — TELEPHONE ENCOUNTER
Patient started taking Celebrex 200mg last night. She states all night she has had acid backing up in throat, vomiting and still very nauseated this morning.     Please advise [phone]    [phone]

## 2020-01-07 NOTE — TELEPHONE ENCOUNTER
Per Dr. Devika Beth, patient can stop the Celebrex 200, if patient would like she can try the Celebrex 100mg. Returned call to patient, verified Name/, informed patient of above. Per patient, she would like to try to take the medication in the morning with her food, as she really wants to try to take the medication. Patient inquired if she can take any OTC heart burn medication that may help as well. Per Dr. Devika Beth, patient can try OTC prilosec, take that first thing in the morning, then eat breakfast, then take her celebrex. Informed patient of above. Patient verbalized agreement/understanding. No further action required at this time.

## 2020-01-15 DIAGNOSIS — M48.02 CERVICAL SPINAL STENOSIS: ICD-10-CM

## 2020-01-15 DIAGNOSIS — R29.898 RIGHT ARM WEAKNESS: ICD-10-CM
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
## 2020-01-15 DIAGNOSIS — R26.9 GAIT ABNORMALITY: ICD-10-CM

## 2020-02-04 ENCOUNTER — OFFICE VISIT (OUTPATIENT)
Dept: ORTHOPEDIC SURGERY | Age: 70
End: 2020-02-04

## 2020-02-04 VITALS
HEIGHT: 69 IN | RESPIRATION RATE: 16 BRPM | BODY MASS INDEX: 27.4 KG/M2 | HEART RATE: 105 BPM | SYSTOLIC BLOOD PRESSURE: 133 MMHG | WEIGHT: 185 LBS | DIASTOLIC BLOOD PRESSURE: 75 MMHG

## 2020-02-04 DIAGNOSIS — R29.898 RIGHT ARM WEAKNESS: ICD-10-CM

## 2020-02-04 DIAGNOSIS — M54.12 CERVICAL RADICULOPATHY: ICD-10-CM

## 2020-02-04 DIAGNOSIS — M54.2 CERVICAL PAIN: Primary | ICD-10-CM

## 2020-02-04 DIAGNOSIS — R20.0 RIGHT ARM NUMBNESS: ICD-10-CM

## 2020-02-04 RX ORDER — GABAPENTIN 300 MG/1
600 CAPSULE ORAL 3 TIMES DAILY
Qty: 180 CAP | Refills: 1 | Status: SHIPPED | OUTPATIENT
Start: 2020-02-04 | End: 2020-02-11 | Stop reason: SDUPTHER

## 2020-02-04 RX ORDER — TRAMADOL HYDROCHLORIDE 50 MG/1
TABLET ORAL
COMMUNITY
Start: 2020-01-31 | End: 2020-03-16 | Stop reason: ALTCHOICE
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
## 2020-02-04 NOTE — PATIENT INSTRUCTIONS
Electromyogram (EMG) and Nerve Conduction Studies: About These Tests What are they? An electromyogram (EMG) measures the electrical activity of your muscles when you are not using them (at rest) and when you tighten them (muscle contraction). Nerve conduction studies (NCS) measure how well and how fast the nerves can send electrical signals. EMG and nerve conduction studies are often done together. If they are done together, the nerve conduction studies are done before the EMG. Why are they done? You may need an EMG to find diseases that damage your muscles or nerves or to find why you cannot move your muscles (paralysis), why they feel weak, or why they twitch. You may need nerve conduction studies to find damage to the nerves that lead from the brain and spinal cord to the rest of the body (peripheral nervous system). Nerve conduction studies are often used to help find nerve disorders, such as carpal tunnel syndrome. How can you prepare for these tests? · Tell your doctors ALL the medicines, vitamins, supplements, and herbal remedies you take. Some medicines can affect the test results. You may need to stop taking some medicines before you have this test.  
  · If you take aspirin or some other blood thinner, be sure to talk to your doctor. He or she will tell you if you should stop taking it before your test. Make sure that you understand exactly what your doctor wants you to do.  
  · Wear loose-fitting clothing. You may be given a hospital gown to wear.  
  · The electrodes for the test are attached to your skin. Your skin needs to be clean and free of sprays, oils, creams, and lotions. What happens during the tests? You lie on a table or bed or sit in a reclining chair so your muscles are relaxed. For an EMG: 
· Your doctor will insert a needle electrode into a muscle.  This will record the electrical activity while the muscle is at rest. You may feel a quick, sharp pain when the needle electrode is put into a muscle. · Your doctor will ask you to tighten the same muscle slowly and steadily while the electrical activity is recorded. · Your doctor may move the electrode to a different area of the muscle or a different muscle. For nerve conduction studies: 
· Your doctor will attach two types of electrodes to your skin. ? One type of electrode is placed over a nerve and will give the nerve an electrical pulse. ? The other type of electrode is placed over the muscle that the nerve controls. It will record how long it takes the muscle to react to the electrical pulse. · You will be able to feel the electrical pulses. They are small shocks and are safe. What else should you know about these tests? · After an EMG, you may be sore and have a tingling feeling in your muscles for up to 2 days. You may have small bruises or swelling at the needle site. · For an EMG, you may be asked to sign a consent form. Talk to your doctor about any concerns you have about the need for the test, its risks, how it will be done, or what the results will mean. How long do they take? · An EMG may take 30 to 60 minutes. · Nerve conduction tests may take from 15 minutes to 1 hour or more. It depends on how many nerves and muscles your doctor tests. What happens after these tests? · If any of the test areas are sore: 
? Put ice or a cold pack on the area for 10 to 20 minutes at a time. Put a thin cloth between the ice and your skin. ? Take an over-the-counter pain medicine, such as acetaminophen (Tylenol), ibuprofen (Advil, Motrin), or naproxen (Aleve). Be safe with medicines. Read and follow all instructions on the label. · You will probably be able to go home right away. · You can go back to your usual activities right away. When should you call for help? Watch closely for changes in your health, and be sure to contact your doctor if: · Muscle pain from an EMG test gets worse or you have swelling, tenderness, or pus at any of the needle sites. · You have any problems that you think may be from the test. 
· You have any questions about the test or have not received your results. Follow-up care is a key part of your treatment and safety. Be sure to make and go to all appointments, and call your doctor if you are having problems. It's also a good idea to keep a list of the medicines you take. Ask your doctor when you can expect to have your test results. Where can you learn more? Go to http://candice-abby.info/. Enter D180 in the search box to learn more about \"Electromyogram (EMG) and Nerve Conduction Studies: About These Tests. \" Current as of: March 28, 2019 Content Version: 12.2 © 6440-5652 [employer]. Care instructions adapted under license by [employer] (which disclaims liability or warranty for this information). If you have questions about a medical condition or this instruction, always ask your healthcare professional. [address] any warranty or liability for your use of this information.

## 2020-02-07 ENCOUNTER — OFFICE VISIT (OUTPATIENT)
Dept: ORTHOPEDIC SURGERY | Age: 70
End: 2020-02-07

## 2020-02-07 VITALS
TEMPERATURE: 98.5 F | RESPIRATION RATE: 24 BRPM | HEART RATE: 92 BPM | SYSTOLIC BLOOD PRESSURE: 101 MMHG | WEIGHT: 184.6 LBS | DIASTOLIC BLOOD PRESSURE: 63 MMHG | OXYGEN SATURATION: 97 % | BODY MASS INDEX: 27.34 KG/M2 | HEIGHT: 69 IN

## 2020-02-07 DIAGNOSIS — R94.131 ABNORMAL EMG: ICD-10-CM

## 2020-02-07 DIAGNOSIS — R20.0 RIGHT ARM NUMBNESS: ICD-10-CM

## 2020-02-07 DIAGNOSIS — R29.898 RIGHT ARM WEAKNESS: Primary | ICD-10-CM
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
## 2020-02-07 NOTE — LETTER
2/7/20 Patient: Pravin Ochoa YOB: 1950 Date of Visit: 2/7/2020 Mayte Ghotra DO 
[address] [address] [address] [id number] VIA Facsimile: [phone] Suzanna Condon MD 
[address] [place] [address] VIA In Basket Dear DO Suzanna Kim MD, Thank you for referring Ms. Caren Eugene to [address] [place] [address] for evaluation. My notes for this consultation are attached. If you have questions, please do not hesitate to call me. I look forward to following your patient along with you.  
 
 
Sincerely, 
 
Melba Miranda MD

## 2020-02-11 ENCOUNTER — OFFICE VISIT (OUTPATIENT)
Dept: ORTHOPEDIC SURGERY | Age: 70
End: 2020-02-11

## 2020-02-11 VITALS
TEMPERATURE: 97.7 F | DIASTOLIC BLOOD PRESSURE: 77 MMHG | RESPIRATION RATE: 16 BRPM | SYSTOLIC BLOOD PRESSURE: 147 MMHG | BODY MASS INDEX: 27.66 KG/M2 | OXYGEN SATURATION: 99 % | HEIGHT: 69 IN | HEART RATE: 105 BPM

## 2020-02-11 DIAGNOSIS — M40.202 KYPHOSIS OF CERVICAL REGION, UNSPECIFIED KYPHOSIS TYPE: Primary | ICD-10-CM

## 2020-02-11 DIAGNOSIS — M48.02 CERVICAL SPINAL STENOSIS: ICD-10-CM

## 2020-02-11 NOTE — PROGRESS NOTES
[address]  [address], [address]  [place]  Phone: [phone]  Fax: [phone]  PROGRESS NOTE  Patient: Jozef Hummel                MRN: [id number]       SSN: [id number]  YOB: 1950        AGE: 68 y.o. SEX: female  Body mass index is 27.66 kg/m². PCP: Tito Hay DO  02/11/20    Chief Complaint   Patient presents with    Neck Pain     neck pain, Sx consult        HISTORY OF PRESENT ILLNESS, RADIOGRAPHS, and PLAN:     HISTORY OF PRESENT ILLNESS:  Ms. Nichole Espinoza returns today. She found use of the soft collar helps her pain significantly, though she is still hurting very much. The EMGs we obtained demonstrated some carpal tunnel syndrome and an old C5-6 radiculopathy. ASSESSMENT/PLAN: I went over the nature of her problems again with her and discussed her surgical plan. In my hands, anteriorly, we would perform a cervical discectomy and fusion at C4-5, the junctional kyphotic segment between her two previous fusions. Then, posteriorly, we would perform a C3 laminectomy to decompress that area, as well as a posterior fusion probably from C2 down to C7 in an attempt to provide a posterior fusion tension band, improve her cervical kyphosis, and eliminate instability. I discussed the risks, benefits, complications, and alternatives to surgery. She wishes to proceed. I, once again, made an offer to refer her for a second opinion consultation at a tertiary medical center. She wishes to maintain with our current surgical plan. We will proceed with surgery once the appropriate approvals and clearances take place. cc: Yahir Carmichael D.O. Past Medical History:   Diagnosis Date    Arthritis     rheumatoid arthritis    Chronic pain     Ill-defined condition     polymyalgia       No family history on file.     Current Outpatient Medications   Medication Sig Dispense Refill    traMADol (ULTRAM) 50 mg tablet tramadol 50 mg tablet   Take 1 tablet twice a day by oral route as needed.  gabapentin (NEURONTIN) 300 mg capsule Take 1 Cap by mouth daily AND 2 Caps every evening. Max Daily Amount: 900 mg. 90 Cap 3    celecoxib (CELEBREX) 200 mg capsule Take 1 Cap by mouth two (2) times daily as needed for Pain for up to 90 days. 60 Cap 1    sertraline (ZOLOFT) 25 mg tablet Take 1 Tab by mouth daily. 0    pramipexole (MIRAPEX) 0.5 mg tablet Take 0.5 mg by mouth nightly.  pravastatin (PRAVACHOL) 80 mg tablet Take 80 mg by mouth nightly.  multivitamin (ONE A DAY) tablet Take 1 Tab by mouth daily.  cholecalciferol, vitamin D3, (VITAMIN D3) 2,000 unit tab Take 1 Tab by mouth daily.  Omega-3 Fatty Acids (FISH OIL) 500 mg cap Take 1 Cap by mouth daily.  tiZANidine (ZANAFLEX) 2 mg tablet Take 1 Tab by mouth three (3) times daily as needed for Pain. 45 Tab 0    DULoxetine (CYMBALTA) 60 mg capsule Take 1 Cap by mouth daily.  ibuprofen-famotidine (DUEXIS) 800-26.6 mg tab Take 1 Tab by mouth three (3) times daily as needed.  metaxalone (SKELAXIN) 800 mg tablet Take 1 Tab by mouth every evening.  diclofenac (VOLTAREN) 1 % gel Apply 4 g to affected area four (4) times daily. Apply 4 gm to left knee qid for osteoarthritis 5 Each 1    ibuprofen (ADVIL) 200 mg tablet Take 600 mg by mouth every eight (8) hours as needed.          Allergies   Allergen Reactions    Doxycycline Other (comments)     Lower blood sugars    Statins-Hmg-Coa Reductase Inhibitors Other (comments)       Past Surgical History:   Procedure Laterality Date    COLONOSCOPY N/A 7/2/2019    COLONOS[facility] performed by Raul Vila MD at [facility] ENDOSCOPY    HX ORTHOPAEDIC      multiple arm surgeries    HX ORTHOPAEDIC      multiple neck surgeries    HX ORTHOPAEDIC      Multiple rib repair     HX ORTHOPAEDIC      coccyx sx       Past Medical History:   Diagnosis Date    Arthritis     rheumatoid arthritis    Chronic pain     Ill-defined condition     polymyalgia       Social History     Socioeconomic History    Marital status:      Spouse name: Not on file    Number of children: Not on file    Years of education: Not on file    Highest education level: Not on file   Occupational History    Not on file   Social Needs    Financial resource strain: Not on file    Food insecurity:     Worry: Not on file     Inability: Not on file    Transportation needs:     Medical: Not on file     Non-medical: Not on file   Tobacco Use    Smoking status: Never Smoker    Smokeless tobacco: Never Used   Substance and Sexual Activity    Alcohol use: No    Drug use: No    Sexual activity: Never   Lifestyle    Physical activity:     Days per week: Not on file     Minutes per session: Not on file    Stress: Not on file   Relationships    Social connections:     Talks on phone: Not on file     Gets together: Not on file     Attends Restorationism service: Not on file     Active member of club or organization: Not on file     Attends meetings of clubs or organizations: Not on file     Relationship status: Not on file    Intimate partner violence:     Fear of current or ex partner: Not on file     Emotionally abused: Not on file     Physically abused: Not on file     Forced sexual activity: Not on file   Other Topics Concern    Not on file   Social History Narrative    Not on file         REVIEW OF SYSTEMS:   CONSTITUTIONAL SYMPTOMS:  Negative. EYES:  Negative. EARS, NOSE, THROAT AND MOUTH:  Negative. CARDIOVASCULAR:  Negative. RESPIRATORY:  Negative. GENITOURINARY: Per HPI. GASTROINTESTINAL:  Per HPI. INTEGUMENTARY (SKIN AND/OR BREAST):  Negative. MUSCULOSKELETAL: Per HPI.   ENDOCRINE/RHEUMATOLOGIC:  Negative. NEUROLOGICAL:  Per HPI. HEMATOLOGIC/LYMPHATIC:  Negative. ALLERGIC/IMMUNOLOGIC:  Negative. PSYCHIATRIC:  Negative.     PHYSICAL EXAMINATION:   Visit Vitals  /77 (BP 1 Location: Left arm, BP Patient Position: Sitting)   Pulse (!) 105   Temp 97.7 °F (36.5 °C) (Oral)   Resp 16   Ht 5' 8.5\" (1.74 m)   SpO2 99%   BMI 27.66 kg/m²    PAIN SCALE: 8/10    CONSTITUTIONAL: The patient is in no apparent distress and is alert and oriented x 3. HEENT: Normocephalic. Hearing grossly intact. NECK: Supple and symmetric. no tenderness, or masses were felt. RESPIRATORY: No labored breathing. CARDIOVASCULAR: The carotid pulses were normal. Peripheral pulses were 2+. CHEST: Normal AP diameter and normal contour without any kyphoscoliosis. LYMPHATIC: No lymphadenopathy was appreciated in the neck, axillae or groin. SKIN:  Negative for scars, rashes, lesions, or ulcers on the right upper, right lower, left upper, left lower and trunk. NEUROLOGICAL: Alert and oriented x 3. Ambulation without assistive device. FWB. EXTREMITIES: See musculoskeletal.  MUSCULOSKELETAL:   Head and Neck: Neck pain. Negative for misalignment, asymmetry, crepitation, defects, tenderness masses or effusions.  Left Upper Extremity: Inspection, percussion and palpation performed. Lees sign is negative.  Right Upper Extremity: Pain, numbness. Inspection, percussion and palpation performed. Lees sign is negative.  Spine, Ribs and Pelvis: Inspection, percussion and palpation performed. Negative for misalignment, asymmetry, crepitation, defects, tenderness masses or effusions.  Left Lower Extremity: Inspection, percussion and palpation performed. Negative straight leg raise.  Right Lower Extremity: Inspection, percussion and palpation performed. Negative straight leg raise. SPINE EXAM:     Cervical spine: Neck is midline. Normal muscle tone. No focal atrophy is noted. ASSESSMENT    ICD-10-CM ICD-9-CM    1. Kyphosis of cervical region, unspecified kyphosis type M40.202 737.10    2.  Cervical spinal stenosis M48.02 723.0        Written by Luz Hodgkins, as dictated by Gerardo Cain MD.    I, Dr. Caron Dyer Nicole Mueller MD, confirm that all documentation is accurate.
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
## 2020-02-18 ENCOUNTER — HOSPITAL ENCOUNTER (OUTPATIENT)
Dept: GENERAL RADIOLOGY | Age: 70
Discharge: HOME | End: 2020-02-18
Payer: MEDICARE

## 2020-02-18 ENCOUNTER — HOSPITAL ENCOUNTER (OUTPATIENT)
Dept: LAB | Age: 70
Discharge: HOME | End: 2020-02-18
Payer: MEDICARE

## 2020-02-18 DIAGNOSIS — M48.02 CERVICAL STENOSIS OF SPINE: ICD-10-CM

## 2020-02-18 DIAGNOSIS — Z01.818 OTHER SPECIFIED PRE-OPERATIVE EXAMINATION: ICD-10-CM

## 2020-02-18 LAB
ANION GAP SERPL CALC-SCNC: 4 MMOL/L (ref 3–18)
ATRIAL RATE: 80 BPM
BUN SERPL-MCNC: 15 MG/DL (ref 7–18)
BUN/CREAT SERPL: 16 (ref 12–20)
CALCIUM SERPL-MCNC: 8.7 MG/DL (ref 8.5–10.1)
CALCULATED P AXIS, ECG09: 33 DEGREES
CALCULATED R AXIS, ECG10: 45 DEGREES
CALCULATED T AXIS, ECG11: 48 DEGREES
CHLORIDE SERPL-SCNC: 104 MMOL/L (ref 100–111)
CO2 SERPL-SCNC: 32 MMOL/L (ref 21–32)
CREAT SERPL-MCNC: 0.92 MG/DL (ref 0.6–1.3)
DIAGNOSIS, 93000: NORMAL
ERYTHROCYTE [DISTWIDTH] IN BLOOD BY AUTOMATED COUNT: 13.3 % (ref 11.6–14.5)
GLUCOSE SERPL-MCNC: 86 MG/DL (ref 74–99)
HCT VFR BLD AUTO: 37.5 % (ref 35–45)
HGB BLD-MCNC: 11.7 G/DL (ref 12–16)
MCH RBC QN AUTO: 28.6 PG (ref 24–34)
MCHC RBC AUTO-ENTMCNC: 31.2 G/DL (ref 31–37)
MCV RBC AUTO: 91.7 FL (ref 74–97)
P-R INTERVAL, ECG05: 158 MS
PLATELET # BLD AUTO: 125 K/UL (ref 135–420)
PMV BLD AUTO: 12.1 FL (ref 9.2–11.8)
POTASSIUM SERPL-SCNC: 4.2 MMOL/L (ref 3.5–5.5)
Q-T INTERVAL, ECG07: 382 MS
QRS DURATION, ECG06: 70 MS
QTC CALCULATION (BEZET), ECG08: 440 MS
RBC # BLD AUTO: 4.09 M/UL (ref 4.2–5.3)
SODIUM SERPL-SCNC: 140 MMOL/L (ref 136–145)
VENTRICULAR RATE, ECG03: 80 BPM
WBC # BLD AUTO: 4.8 K/UL (ref 4.6–13.2)

## 2020-02-18 PROCEDURE — 85027 COMPLETE CBC AUTOMATED: CPT

## 2020-02-18 PROCEDURE — 71046 X-RAY EXAM CHEST 2 VIEWS: CPT

## 2020-02-18 PROCEDURE — 93005 ELECTROCARDIOGRAM TRACING: CPT

## 2020-02-18 PROCEDURE — 36415 COLL VENOUS BLD VENIPUNCTURE: CPT

## 2020-02-18 PROCEDURE — 80048 BASIC METABOLIC PNL TOTAL CA: CPT
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
## 2020-03-01 ENCOUNTER — ANESTHESIA EVENT (OUTPATIENT)
Dept: SURGERY | Age: 70
DRG: 455 | End: 2020-03-01
Payer: MEDICARE

## 2020-03-02 ENCOUNTER — ANESTHESIA (OUTPATIENT)
Dept: SURGERY | Age: 70
DRG: 455 | End: 2020-03-02
Payer: MEDICARE

## 2020-03-02 ENCOUNTER — APPOINTMENT (OUTPATIENT)
Dept: GENERAL RADIOLOGY | Age: 70
DRG: 455 | End: 2020-03-02
Attending: ORTHOPAEDIC SURGERY
Payer: MEDICARE

## 2020-03-02 ENCOUNTER — HOSPITAL ENCOUNTER (INPATIENT)
Age: 70
LOS: 1 days | Discharge: HOME | DRG: 455 | End: 2020-03-03
Attending: ORTHOPAEDIC SURGERY | Admitting: ORTHOPAEDIC SURGERY
Payer: MEDICARE

## 2020-03-02 DIAGNOSIS — Z98.1 S/P CERVICAL SPINAL FUSION: Primary | ICD-10-CM

## 2020-03-02 PROBLEM — M48.02 CERVICAL STENOSIS OF SPINE: Status: ACTIVE | Noted: 2020-03-02

## 2020-03-02 PROBLEM — G95.9 CERVICAL MYELOPATHY WITH CERVICAL RADICULOPATHY (HCC): Status: ACTIVE | Noted: 2020-03-02

## 2020-03-02 PROBLEM — M40.202 CERVICAL KYPHOSIS: Status: ACTIVE | Noted: 2020-03-02

## 2020-03-02 PROBLEM — M54.12 CERVICAL MYELOPATHY WITH CERVICAL RADICULOPATHY (HCC): Status: ACTIVE | Noted: 2020-03-02

## 2020-03-02 PROCEDURE — 74011250636 HC RX REV CODE- 250/636: Performed by: NURSE ANESTHETIST, CERTIFIED REGISTERED

## 2020-03-02 PROCEDURE — 65270000029 HC RM PRIVATE

## 2020-03-02 PROCEDURE — L0120 CERV FLEX N/ADJ FOAM PRE OTS: HCPCS | Performed by: ORTHOPAEDIC SURGERY

## 2020-03-02 PROCEDURE — C1713 ANCHOR/SCREW BN/BN,TIS/BN: HCPCS | Performed by: ORTHOPAEDIC SURGERY

## 2020-03-02 PROCEDURE — 76010000134 HC OR TIME 3.5 TO 4 HR: Performed by: ORTHOPAEDIC SURGERY

## 2020-03-02 PROCEDURE — 77030011267 HC ELECTRD BLD COVD -A: Performed by: ORTHOPAEDIC SURGERY

## 2020-03-02 PROCEDURE — 77030018836 HC SOL IRR NACL ICUM -A: Performed by: ORTHOPAEDIC SURGERY

## 2020-03-02 PROCEDURE — 77030031139 HC SUT VCRL2 J&J -A: Performed by: ORTHOPAEDIC SURGERY

## 2020-03-02 PROCEDURE — 77030030105 HC BIT DRL VUEPNT NUVA -B: Performed by: ORTHOPAEDIC SURGERY

## 2020-03-02 PROCEDURE — 97530 THERAPEUTIC ACTIVITIES: CPT

## 2020-03-02 PROCEDURE — 00NW0ZZ RELEASE CERVICAL SPINAL CORD, OPEN APPROACH: ICD-10-PCS | Performed by: ORTHOPAEDIC SURGERY

## 2020-03-02 PROCEDURE — 77030040356 HC CORD BPLR FRCP COVD -A: Performed by: ORTHOPAEDIC SURGERY

## 2020-03-02 PROCEDURE — 0PP304Z REMOVAL OF INTERNAL FIXATION DEVICE FROM CERVICAL VERTEBRA, OPEN APPROACH: ICD-10-PCS | Performed by: ORTHOPAEDIC SURGERY

## 2020-03-02 PROCEDURE — 77030004402 HC BUR NEUR STRY -C: Performed by: ORTHOPAEDIC SURGERY

## 2020-03-02 PROCEDURE — 74011250637 HC RX REV CODE- 250/637: Performed by: ORTHOPAEDIC SURGERY

## 2020-03-02 PROCEDURE — 0RT30ZZ RESECTION OF CERVICAL VERTEBRAL DISC, OPEN APPROACH: ICD-10-PCS | Performed by: ORTHOPAEDIC SURGERY

## 2020-03-02 PROCEDURE — 77030010512 HC APPL CLP LIG J&J -C: Performed by: ORTHOPAEDIC SURGERY

## 2020-03-02 PROCEDURE — 77030029099 HC BN WAX SSPC -A: Performed by: ORTHOPAEDIC SURGERY

## 2020-03-02 PROCEDURE — 74011000250 HC RX REV CODE- 250: Performed by: ORTHOPAEDIC SURGERY

## 2020-03-02 PROCEDURE — 97161 PT EVAL LOW COMPLEX 20 MIN: CPT

## 2020-03-02 PROCEDURE — 77030012406 HC DRN WND PENRS BARD -A: Performed by: ORTHOPAEDIC SURGERY

## 2020-03-02 PROCEDURE — 77030002933 HC SUT MCRYL J&J -A: Performed by: ORTHOPAEDIC SURGERY

## 2020-03-02 PROCEDURE — 77030040830 HC CATH URETH FOL MDII -A: Performed by: ORTHOPAEDIC SURGERY

## 2020-03-02 PROCEDURE — 77030035236 HC SUT PDS STRATFX BARB J&J -B: Performed by: ORTHOPAEDIC SURGERY

## 2020-03-02 PROCEDURE — 77030039266 HC ADH SKN EXOFIN S2SG -A: Performed by: ORTHOPAEDIC SURGERY

## 2020-03-02 PROCEDURE — 0RG20K1 FUSION OF 2 OR MORE CERVICAL VERTEBRAL JOINTS WITH NONAUTOLOGOUS TISSUE SUBSTITUTE, POSTERIOR APPROACH, POSTERIOR COLUMN, OPEN APPROACH: ICD-10-PCS | Performed by: ORTHOPAEDIC SURGERY

## 2020-03-02 PROCEDURE — 74011250637 HC RX REV CODE- 250/637: Performed by: NURSE ANESTHETIST, CERTIFIED REGISTERED

## 2020-03-02 PROCEDURE — 77030037102 HC SPCR CERV ALLGRFT TRIAD NUVA -G1: Performed by: ORTHOPAEDIC SURGERY

## 2020-03-02 PROCEDURE — 77030013567 HC DRN WND RESERV BARD -A: Performed by: ORTHOPAEDIC SURGERY

## 2020-03-02 PROCEDURE — 77030011265 HC ELECTRD BLD HEX COVD -A: Performed by: ORTHOPAEDIC SURGERY

## 2020-03-02 PROCEDURE — 77030030163 HC BN WAX J&J -A: Performed by: ORTHOPAEDIC SURGERY

## 2020-03-02 PROCEDURE — 77030039267 HC ADH SKN EXOFIN S2SG -B: Performed by: ORTHOPAEDIC SURGERY

## 2020-03-02 PROCEDURE — 77030040922 HC BLNKT HYPOTHRM STRY -A: Performed by: ORTHOPAEDIC SURGERY

## 2020-03-02 PROCEDURE — 77030012890: Performed by: ORTHOPAEDIC SURGERY

## 2020-03-02 PROCEDURE — 74011000272 HC RX REV CODE- 272: Performed by: ORTHOPAEDIC SURGERY

## 2020-03-02 PROCEDURE — 74011000250 HC RX REV CODE- 250: Performed by: NURSE ANESTHETIST, CERTIFIED REGISTERED

## 2020-03-02 PROCEDURE — 74011250636 HC RX REV CODE- 250/636: Performed by: ORTHOPAEDIC SURGERY

## 2020-03-02 PROCEDURE — 77030040361 HC SLV COMPR DVT MDII -B: Performed by: ORTHOPAEDIC SURGERY

## 2020-03-02 PROCEDURE — 77030020274 HC MISC IMPL ORTHOPEDIC: Performed by: ORTHOPAEDIC SURGERY

## 2020-03-02 PROCEDURE — 74011250636 HC RX REV CODE- 250/636

## 2020-03-02 PROCEDURE — 0RG10K0 FUSION OF CERVICAL VERTEBRAL JOINT WITH NONAUTOLOGOUS TISSUE SUBSTITUTE, ANTERIOR APPROACH, ANTERIOR COLUMN, OPEN APPROACH: ICD-10-PCS | Performed by: ORTHOPAEDIC SURGERY

## 2020-03-02 PROCEDURE — 74011250636 HC RX REV CODE- 250/636: Performed by: NURSE PRACTITIONER

## 2020-03-02 PROCEDURE — 74011000258 HC RX REV CODE- 258: Performed by: NURSE ANESTHETIST, CERTIFIED REGISTERED

## 2020-03-02 PROCEDURE — 76060000038 HC ANESTHESIA 3.5 TO 4 HR: Performed by: ORTHOPAEDIC SURGERY

## 2020-03-02 PROCEDURE — 76210000006 HC OR PH I REC 0.5 TO 1 HR: Performed by: ORTHOPAEDIC SURGERY

## 2020-03-02 PROCEDURE — 77030027138 HC INCENT SPIROMETER -A: Performed by: ORTHOPAEDIC SURGERY

## 2020-03-02 DEVICE — ROD SPNL L60MM DIA3.5MM POST OCCIPITOCERVICOTHORACIC: Type: IMPLANTABLE DEVICE | Site: SPINE CERVICAL | Status: FUNCTIONAL

## 2020-03-02 DEVICE — IMPLANTABLE DEVICE: Type: IMPLANTABLE DEVICE | Site: SPINE CERVICAL | Status: FUNCTIONAL

## 2020-03-02 DEVICE — GRAFT SPNL W11XH7XL14MM CORTICOCANCELLOUS LORD TRIAD: Type: IMPLANTABLE DEVICE | Site: SPINE CERVICAL | Status: FUNCTIONAL

## 2020-03-02 DEVICE — SCREW SPNL L13MM DIA4MM ANT CERV SELF DRL VAR ANG ARCHON: Type: IMPLANTABLE DEVICE | Site: SPINE CERVICAL | Status: FUNCTIONAL

## 2020-03-02 DEVICE — SCREW SPNL L L12MM DIA3.5MM POST OCCIPITOCERVICOTHORACIC: Type: IMPLANTABLE DEVICE | Site: SPINE CERVICAL | Status: FUNCTIONAL

## 2020-03-02 RX ORDER — NEOSTIGMINE METHYLSULFATE 1 MG/ML
INJECTION, SOLUTION INTRAVENOUS AS NEEDED
Status: DISCONTINUED | OUTPATIENT
Start: 2020-03-02 | End: 2020-03-02 | Stop reason: HOSPADM

## 2020-03-02 RX ORDER — CEFAZOLIN SODIUM 2 G/50ML
2 SOLUTION INTRAVENOUS EVERY 8 HOURS
Status: DISCONTINUED | OUTPATIENT
Start: 2020-03-02 | End: 2020-03-03 | Stop reason: HOSPADM

## 2020-03-02 RX ORDER — HYDROMORPHONE HYDROCHLORIDE 1 MG/ML
1 INJECTION, SOLUTION INTRAMUSCULAR; INTRAVENOUS; SUBCUTANEOUS
Status: DISCONTINUED | OUTPATIENT
Start: 2020-03-02 | End: 2020-03-03 | Stop reason: HOSPADM

## 2020-03-02 RX ORDER — PRAMIPEXOLE DIHYDROCHLORIDE 0.5 MG/1
0.5 TABLET ORAL
Status: DISCONTINUED | OUTPATIENT
Start: 2020-03-02 | End: 2020-03-03 | Stop reason: HOSPADM

## 2020-03-02 RX ORDER — ONDANSETRON 2 MG/ML
4 INJECTION INTRAMUSCULAR; INTRAVENOUS
Status: DISCONTINUED | OUTPATIENT
Start: 2020-03-02 | End: 2020-03-03 | Stop reason: HOSPADM

## 2020-03-02 RX ORDER — ROCURONIUM BROMIDE 10 MG/ML
INJECTION, SOLUTION INTRAVENOUS AS NEEDED
Status: DISCONTINUED | OUTPATIENT
Start: 2020-03-02 | End: 2020-03-02 | Stop reason: HOSPADM

## 2020-03-02 RX ORDER — LORAZEPAM 2 MG/ML
1 INJECTION INTRAMUSCULAR
Status: DISCONTINUED | OUTPATIENT
Start: 2020-03-02 | End: 2020-03-03 | Stop reason: HOSPADM

## 2020-03-02 RX ORDER — ONDANSETRON 2 MG/ML
4 INJECTION INTRAMUSCULAR; INTRAVENOUS ONCE
Status: COMPLETED | OUTPATIENT
Start: 2020-03-02 | End: 2020-03-02

## 2020-03-02 RX ORDER — PROPOFOL 10 MG/ML
INJECTION, EMULSION INTRAVENOUS AS NEEDED
Status: DISCONTINUED | OUTPATIENT
Start: 2020-03-02 | End: 2020-03-02 | Stop reason: HOSPADM

## 2020-03-02 RX ORDER — METHYLENE BLUE 10 MG/ML
INJECTION INTRAVENOUS AS NEEDED
Status: DISCONTINUED | OUTPATIENT
Start: 2020-03-02 | End: 2020-03-02 | Stop reason: HOSPADM

## 2020-03-02 RX ORDER — OXYCODONE HYDROCHLORIDE 5 MG/1
5-10 TABLET ORAL
Status: DISCONTINUED | OUTPATIENT
Start: 2020-03-02 | End: 2020-03-03 | Stop reason: HOSPADM

## 2020-03-02 RX ORDER — KETAMINE HCL 50MG/ML(1)
SYRINGE (ML) INTRAVENOUS AS NEEDED
Status: DISCONTINUED | OUTPATIENT
Start: 2020-03-02 | End: 2020-03-02 | Stop reason: HOSPADM

## 2020-03-02 RX ORDER — VANCOMYCIN HYDROCHLORIDE 1 G/20ML
INJECTION, POWDER, LYOPHILIZED, FOR SOLUTION INTRAVENOUS AS NEEDED
Status: DISCONTINUED | OUTPATIENT
Start: 2020-03-02 | End: 2020-03-02 | Stop reason: HOSPADM

## 2020-03-02 RX ORDER — SODIUM CHLORIDE 0.9 % (FLUSH) 0.9 %
5-40 SYRINGE (ML) INJECTION EVERY 8 HOURS
Status: DISCONTINUED | OUTPATIENT
Start: 2020-03-02 | End: 2020-03-02 | Stop reason: HOSPADM

## 2020-03-02 RX ORDER — FENTANYL CITRATE 50 UG/ML
INJECTION, SOLUTION INTRAMUSCULAR; INTRAVENOUS AS NEEDED
Status: DISCONTINUED | OUTPATIENT
Start: 2020-03-02 | End: 2020-03-02 | Stop reason: HOSPADM

## 2020-03-02 RX ORDER — MIDAZOLAM HYDROCHLORIDE 1 MG/ML
INJECTION, SOLUTION INTRAMUSCULAR; INTRAVENOUS AS NEEDED
Status: DISCONTINUED | OUTPATIENT
Start: 2020-03-02 | End: 2020-03-02 | Stop reason: HOSPADM

## 2020-03-02 RX ORDER — SODIUM CHLORIDE 0.9 % (FLUSH) 0.9 %
5-40 SYRINGE (ML) INJECTION EVERY 8 HOURS
Status: DISCONTINUED | OUTPATIENT
Start: 2020-03-02 | End: 2020-03-03 | Stop reason: HOSPADM

## 2020-03-02 RX ORDER — SODIUM CHLORIDE 0.9 % (FLUSH) 0.9 %
5-40 SYRINGE (ML) INJECTION AS NEEDED
Status: DISCONTINUED | OUTPATIENT
Start: 2020-03-02 | End: 2020-03-03 | Stop reason: HOSPADM

## 2020-03-02 RX ORDER — EPHEDRINE SULFATE/0.9% NACL/PF 50 MG/5 ML
SYRINGE (ML) INTRAVENOUS AS NEEDED
Status: DISCONTINUED | OUTPATIENT
Start: 2020-03-02 | End: 2020-03-02 | Stop reason: HOSPADM

## 2020-03-02 RX ORDER — DIAZEPAM 5 MG/1
5 TABLET ORAL
Status: DISCONTINUED | OUTPATIENT
Start: 2020-03-02 | End: 2020-03-03 | Stop reason: HOSPADM

## 2020-03-02 RX ORDER — DEXTROSE, SODIUM CHLORIDE, AND POTASSIUM CHLORIDE 5; .45; .15 G/100ML; G/100ML; G/100ML
25 INJECTION INTRAVENOUS CONTINUOUS
Status: DISCONTINUED | OUTPATIENT
Start: 2020-03-02 | End: 2020-03-03 | Stop reason: HOSPADM

## 2020-03-02 RX ORDER — MAGNESIUM SULFATE 100 %
4 CRYSTALS MISCELLANEOUS AS NEEDED
Status: DISCONTINUED | OUTPATIENT
Start: 2020-03-02 | End: 2020-03-02

## 2020-03-02 RX ORDER — SODIUM CHLORIDE 0.9 % (FLUSH) 0.9 %
5-40 SYRINGE (ML) INJECTION AS NEEDED
Status: DISCONTINUED | OUTPATIENT
Start: 2020-03-02 | End: 2020-03-02 | Stop reason: HOSPADM

## 2020-03-02 RX ORDER — DULOXETIN HYDROCHLORIDE 60 MG/1
60 CAPSULE, DELAYED RELEASE ORAL DAILY
Status: DISCONTINUED | OUTPATIENT
Start: 2020-03-03 | End: 2020-03-03 | Stop reason: HOSPADM

## 2020-03-02 RX ORDER — CEFAZOLIN SODIUM 2 G/50ML
2 SOLUTION INTRAVENOUS ONCE
Status: COMPLETED | OUTPATIENT
Start: 2020-03-02 | End: 2020-03-02

## 2020-03-02 RX ORDER — LIDOCAINE HYDROCHLORIDE 20 MG/ML
INJECTION, SOLUTION EPIDURAL; INFILTRATION; INTRACAUDAL; PERINEURAL AS NEEDED
Status: DISCONTINUED | OUTPATIENT
Start: 2020-03-02 | End: 2020-03-02 | Stop reason: HOSPADM

## 2020-03-02 RX ORDER — FAMOTIDINE 20 MG/1
20 TABLET, FILM COATED ORAL ONCE
Status: COMPLETED | OUTPATIENT
Start: 2020-03-02 | End: 2020-03-02

## 2020-03-02 RX ORDER — DEXMEDETOMIDINE HYDROCHLORIDE 100 UG/ML
INJECTION, SOLUTION INTRAVENOUS AS NEEDED
Status: DISCONTINUED | OUTPATIENT
Start: 2020-03-02 | End: 2020-03-02 | Stop reason: HOSPADM

## 2020-03-02 RX ORDER — SUCCINYLCHOLINE CHLORIDE 20 MG/ML
INJECTION INTRAMUSCULAR; INTRAVENOUS AS NEEDED
Status: DISCONTINUED | OUTPATIENT
Start: 2020-03-02 | End: 2020-03-02 | Stop reason: HOSPADM

## 2020-03-02 RX ORDER — ONDANSETRON 2 MG/ML
INJECTION INTRAMUSCULAR; INTRAVENOUS AS NEEDED
Status: DISCONTINUED | OUTPATIENT
Start: 2020-03-02 | End: 2020-03-02 | Stop reason: HOSPADM

## 2020-03-02 RX ORDER — DIPHENHYDRAMINE HYDROCHLORIDE 50 MG/ML
12.5 INJECTION, SOLUTION INTRAMUSCULAR; INTRAVENOUS
Status: DISCONTINUED | OUTPATIENT
Start: 2020-03-02 | End: 2020-03-03 | Stop reason: HOSPADM

## 2020-03-02 RX ORDER — DIPHENHYDRAMINE HCL 25 MG
25 CAPSULE ORAL
Status: DISCONTINUED | OUTPATIENT
Start: 2020-03-02 | End: 2020-03-03 | Stop reason: HOSPADM

## 2020-03-02 RX ORDER — DEXAMETHASONE SODIUM PHOSPHATE 4 MG/ML
INJECTION, SOLUTION INTRA-ARTICULAR; INTRALESIONAL; INTRAMUSCULAR; INTRAVENOUS; SOFT TISSUE AS NEEDED
Status: DISCONTINUED | OUTPATIENT
Start: 2020-03-02 | End: 2020-03-02 | Stop reason: HOSPADM

## 2020-03-02 RX ORDER — INSULIN LISPRO 100 [IU]/ML
INJECTION, SOLUTION INTRAVENOUS; SUBCUTANEOUS
Status: DISCONTINUED | OUTPATIENT
Start: 2020-03-02 | End: 2020-03-02

## 2020-03-02 RX ORDER — DEXTROSE 50 % IN WATER (D50W) INTRAVENOUS SYRINGE
25-50 AS NEEDED
Status: DISCONTINUED | OUTPATIENT
Start: 2020-03-02 | End: 2020-03-02

## 2020-03-02 RX ORDER — NALOXONE HYDROCHLORIDE 0.4 MG/ML
0.4 INJECTION, SOLUTION INTRAMUSCULAR; INTRAVENOUS; SUBCUTANEOUS AS NEEDED
Status: DISCONTINUED | OUTPATIENT
Start: 2020-03-02 | End: 2020-03-03 | Stop reason: HOSPADM

## 2020-03-02 RX ORDER — GLYCOPYRROLATE 0.2 MG/ML
INJECTION INTRAMUSCULAR; INTRAVENOUS AS NEEDED
Status: DISCONTINUED | OUTPATIENT
Start: 2020-03-02 | End: 2020-03-02 | Stop reason: HOSPADM

## 2020-03-02 RX ORDER — HYDROMORPHONE HYDROCHLORIDE 2 MG/ML
0.5 INJECTION, SOLUTION INTRAMUSCULAR; INTRAVENOUS; SUBCUTANEOUS
Status: DISCONTINUED | OUTPATIENT
Start: 2020-03-02 | End: 2020-03-02 | Stop reason: HOSPADM

## 2020-03-02 RX ORDER — ESMOLOL HYDROCHLORIDE 10 MG/ML
INJECTION INTRAVENOUS AS NEEDED
Status: DISCONTINUED | OUTPATIENT
Start: 2020-03-02 | End: 2020-03-02 | Stop reason: HOSPADM

## 2020-03-02 RX ORDER — GABAPENTIN 300 MG/1
300 CAPSULE ORAL 3 TIMES DAILY
Status: DISCONTINUED | OUTPATIENT
Start: 2020-03-02 | End: 2020-03-03 | Stop reason: HOSPADM

## 2020-03-02 RX ORDER — SODIUM CHLORIDE, SODIUM LACTATE, POTASSIUM CHLORIDE, CALCIUM CHLORIDE 600; 310; 30; 20 MG/100ML; MG/100ML; MG/100ML; MG/100ML
INJECTION, SOLUTION INTRAVENOUS
Status: DISCONTINUED | OUTPATIENT
Start: 2020-03-02 | End: 2020-03-02 | Stop reason: HOSPADM

## 2020-03-02 RX ORDER — SERTRALINE HYDROCHLORIDE 25 MG/1
25 TABLET, FILM COATED ORAL DAILY
Status: DISCONTINUED | OUTPATIENT
Start: 2020-03-03 | End: 2020-03-03 | Stop reason: HOSPADM

## 2020-03-02 RX ORDER — SODIUM CHLORIDE, SODIUM LACTATE, POTASSIUM CHLORIDE, CALCIUM CHLORIDE 600; 310; 30; 20 MG/100ML; MG/100ML; MG/100ML; MG/100ML
75 INJECTION, SOLUTION INTRAVENOUS CONTINUOUS
Status: DISCONTINUED | OUTPATIENT
Start: 2020-03-02 | End: 2020-03-02 | Stop reason: HOSPADM

## 2020-03-02 RX ORDER — PHENYLEPHRINE HCL IN 0.9% NACL 1 MG/10 ML
SYRINGE (ML) INTRAVENOUS AS NEEDED
Status: DISCONTINUED | OUTPATIENT
Start: 2020-03-02 | End: 2020-03-02 | Stop reason: HOSPADM

## 2020-03-02 RX ORDER — ACETAMINOPHEN 500 MG
1000 TABLET ORAL EVERY 6 HOURS
Status: DISCONTINUED | OUTPATIENT
Start: 2020-03-02 | End: 2020-03-03 | Stop reason: HOSPADM

## 2020-03-02 RX ADMIN — HYDROMORPHONE HYDROCHLORIDE 1 MG: 1 INJECTION, SOLUTION INTRAMUSCULAR; INTRAVENOUS; SUBCUTANEOUS at 18:47

## 2020-03-02 RX ADMIN — ONDANSETRON 4 MG: 2 INJECTION INTRAMUSCULAR; INTRAVENOUS at 11:17

## 2020-03-02 RX ADMIN — Medication 100 MCG: at 09:44

## 2020-03-02 RX ADMIN — Medication 100 MCG: at 09:11

## 2020-03-02 RX ADMIN — CEFAZOLIN SODIUM 2 G: 2 SOLUTION INTRAVENOUS at 15:06

## 2020-03-02 RX ADMIN — PROMETHAZINE HYDROCHLORIDE 12.5 MG: 25 INJECTION INTRAMUSCULAR; INTRAVENOUS at 07:46

## 2020-03-02 RX ADMIN — DEXMEDETOMIDINE 6 MCG: 100 INJECTION, SOLUTION, CONCENTRATE INTRAVENOUS at 08:29

## 2020-03-02 RX ADMIN — DEXMEDETOMIDINE 4 MCG: 100 INJECTION, SOLUTION, CONCENTRATE INTRAVENOUS at 07:40

## 2020-03-02 RX ADMIN — Medication 20 MG: at 10:00

## 2020-03-02 RX ADMIN — PROPOFOL 120 MG: 10 INJECTION, EMULSION INTRAVENOUS at 07:36

## 2020-03-02 RX ADMIN — DEXMEDETOMIDINE 6 MCG: 100 INJECTION, SOLUTION, CONCENTRATE INTRAVENOUS at 09:53

## 2020-03-02 RX ADMIN — DEXMEDETOMIDINE 4 MCG: 100 INJECTION, SOLUTION, CONCENTRATE INTRAVENOUS at 09:09

## 2020-03-02 RX ADMIN — Medication 100 MCG: at 07:57

## 2020-03-02 RX ADMIN — FAMOTIDINE 20 MG: 20 TABLET ORAL at 06:54

## 2020-03-02 RX ADMIN — HYDROMORPHONE HYDROCHLORIDE 0.5 MG: 2 INJECTION, SOLUTION INTRAMUSCULAR; INTRAVENOUS; SUBCUTANEOUS at 11:31

## 2020-03-02 RX ADMIN — ESMOLOL HYDROCHLORIDE 20 MG: 10 INJECTION, SOLUTION INTRAVENOUS at 11:02

## 2020-03-02 RX ADMIN — DEXMEDETOMIDINE 4 MCG: 100 INJECTION, SOLUTION, CONCENTRATE INTRAVENOUS at 09:35

## 2020-03-02 RX ADMIN — Medication 100 MCG: at 10:20

## 2020-03-02 RX ADMIN — DEXMEDETOMIDINE 4 MCG: 100 INJECTION, SOLUTION, CONCENTRATE INTRAVENOUS at 10:24

## 2020-03-02 RX ADMIN — DEXMEDETOMIDINE 4 MCG: 100 INJECTION, SOLUTION, CONCENTRATE INTRAVENOUS at 10:13

## 2020-03-02 RX ADMIN — DEXMEDETOMIDINE 6 MCG: 100 INJECTION, SOLUTION, CONCENTRATE INTRAVENOUS at 07:45

## 2020-03-02 RX ADMIN — Medication 10 ML: at 22:58

## 2020-03-02 RX ADMIN — Medication 50 MG: at 07:46

## 2020-03-02 RX ADMIN — LIDOCAINE HYDROCHLORIDE 40 MG: 20 INJECTION, SOLUTION EPIDURAL; INFILTRATION; INTRACAUDAL; PERINEURAL at 07:51

## 2020-03-02 RX ADMIN — MIDAZOLAM HYDROCHLORIDE 1 MG: 2 INJECTION, SOLUTION INTRAMUSCULAR; INTRAVENOUS at 07:31

## 2020-03-02 RX ADMIN — HYDROMORPHONE HYDROCHLORIDE 0.5 MG: 2 INJECTION, SOLUTION INTRAMUSCULAR; INTRAVENOUS; SUBCUTANEOUS at 11:17

## 2020-03-02 RX ADMIN — ROCURONIUM BROMIDE 10 MG: 10 INJECTION, SOLUTION INTRAVENOUS at 08:50

## 2020-03-02 RX ADMIN — SUCCINYLCHOLINE CHLORIDE 100 MG: 20 INJECTION, SOLUTION INTRAMUSCULAR; INTRAVENOUS at 07:36

## 2020-03-02 RX ADMIN — FENTANYL CITRATE 50 MCG: 50 INJECTION INTRAMUSCULAR; INTRAVENOUS at 07:58

## 2020-03-02 RX ADMIN — ONDANSETRON 4 MG: 2 INJECTION INTRAMUSCULAR; INTRAVENOUS at 07:46

## 2020-03-02 RX ADMIN — Medication 10 ML: at 13:17

## 2020-03-02 RX ADMIN — ROCURONIUM BROMIDE 40 MG: 10 INJECTION, SOLUTION INTRAVENOUS at 07:42

## 2020-03-02 RX ADMIN — Medication 3 MG: at 10:44

## 2020-03-02 RX ADMIN — ACETAMINOPHEN 1000 MG: 500 TABLET ORAL at 18:47

## 2020-03-02 RX ADMIN — DEXMEDETOMIDINE 4 MCG: 100 INJECTION, SOLUTION, CONCENTRATE INTRAVENOUS at 07:58

## 2020-03-02 RX ADMIN — DEXAMETHASONE SODIUM PHOSPHATE 10 MG: 4 INJECTION, SOLUTION INTRAMUSCULAR; INTRAVENOUS at 07:40

## 2020-03-02 RX ADMIN — LIDOCAINE HYDROCHLORIDE 60 MG: 20 INJECTION, SOLUTION EPIDURAL; INFILTRATION; INTRACAUDAL; PERINEURAL at 07:35

## 2020-03-02 RX ADMIN — CEFAZOLIN 2 G: 10 INJECTION, POWDER, FOR SOLUTION INTRAVENOUS at 07:53

## 2020-03-02 RX ADMIN — Medication 30 MG: at 08:50

## 2020-03-02 RX ADMIN — DEXTROSE MONOHYDRATE, SODIUM CHLORIDE, AND POTASSIUM CHLORIDE 25 ML/HR: 50; 4.5; 1.49 INJECTION, SOLUTION INTRAVENOUS at 13:12

## 2020-03-02 RX ADMIN — DEXMEDETOMIDINE 6 MCG: 100 INJECTION, SOLUTION, CONCENTRATE INTRAVENOUS at 09:24

## 2020-03-02 RX ADMIN — GLYCOPYRROLATE 0.4 MG: 0.2 INJECTION INTRAMUSCULAR; INTRAVENOUS at 10:44

## 2020-03-02 RX ADMIN — PRAMIPEXOLE DIHYDROCHLORIDE 0.5 MG: 0.5 TABLET ORAL at 22:56

## 2020-03-02 RX ADMIN — DEXMEDETOMIDINE 6 MCG: 100 INJECTION, SOLUTION, CONCENTRATE INTRAVENOUS at 08:53

## 2020-03-02 RX ADMIN — GABAPENTIN 300 MG: 300 CAPSULE ORAL at 15:05

## 2020-03-02 RX ADMIN — MIDAZOLAM HYDROCHLORIDE 1 MG: 2 INJECTION, SOLUTION INTRAMUSCULAR; INTRAVENOUS at 07:32

## 2020-03-02 RX ADMIN — ONDANSETRON 4 MG: 2 INJECTION INTRAMUSCULAR; INTRAVENOUS at 10:34

## 2020-03-02 RX ADMIN — FENTANYL CITRATE 50 MCG: 50 INJECTION INTRAMUSCULAR; INTRAVENOUS at 11:02

## 2020-03-02 RX ADMIN — ACETAMINOPHEN 1000 MG: 500 TABLET ORAL at 13:13

## 2020-03-02 RX ADMIN — DEXMEDETOMIDINE 10 MCG: 100 INJECTION, SOLUTION, CONCENTRATE INTRAVENOUS at 07:30

## 2020-03-02 RX ADMIN — Medication 5 MG: at 09:06

## 2020-03-02 RX ADMIN — OXYCODONE HYDROCHLORIDE 10 MG: 5 TABLET ORAL at 22:56

## 2020-03-02 RX ADMIN — GABAPENTIN 300 MG: 300 CAPSULE ORAL at 22:56

## 2020-03-02 RX ADMIN — FENTANYL CITRATE 50 MCG: 50 INJECTION INTRAMUSCULAR; INTRAVENOUS at 09:32

## 2020-03-02 RX ADMIN — SODIUM CHLORIDE, SODIUM LACTATE, POTASSIUM CHLORIDE, AND CALCIUM CHLORIDE 75 ML/HR: 600; 310; 30; 20 INJECTION, SOLUTION INTRAVENOUS at 06:54

## 2020-03-02 RX ADMIN — SODIUM CHLORIDE, SODIUM LACTATE, POTASSIUM CHLORIDE, AND CALCIUM CHLORIDE: 600; 310; 30; 20 INJECTION, SOLUTION INTRAVENOUS at 08:15

## 2020-03-02 RX ADMIN — FENTANYL CITRATE 50 MCG: 50 INJECTION INTRAMUSCULAR; INTRAVENOUS at 07:35

## 2020-03-02 RX ADMIN — DIAZEPAM 5 MG: 5 TABLET ORAL at 15:05

## 2020-03-02 NOTE — PERIOP NOTES
TRANSFER - OUT REPORT:    Verbal report given to Roma Dowsn RN(name) on rEicka Andrew  being transferred to [address] [place] [address](unit) for routine post - op       Report consisted of patients Situation, Background, Assessment and   Recommendations(SBAR). Information from the following report(s) SBAR, OR Summary, Procedure Summary, Intake/Output and MAR was reviewed with the receiving nurse. Lines:   Peripheral IV 03/02/20 Anterior;Distal;Right Forearm (Active)   Site Assessment Clean, dry, & intact 3/2/2020 11:06 AM   Phlebitis Assessment 0 3/2/2020 11:06 AM   Infiltration Assessment 0 3/2/2020 11:06 AM   Dressing Status Clean, dry, & intact 3/2/2020 11:06 AM   Dressing Type Transparent 3/2/2020 11:06 AM   Hub Color/Line Status Pink 3/2/2020 11:06 AM       Peripheral IV 03/02/20 Anterior;Left;Proximal Forearm (Active)   Site Assessment Clean, dry, & intact 3/2/2020 11:06 AM   Phlebitis Assessment 0 3/2/2020 11:06 AM   Infiltration Assessment 0 3/2/2020 11:06 AM   Dressing Status Clean, dry, & intact 3/2/2020 11:06 AM   Dressing Type Transparent 3/2/2020 11:06 AM   Hub Color/Line Status Pink 3/2/2020 11:06 AM        Opportunity for questions and clarification was provided.       Patient transported with:   O2 @ 3 liters  Registered Nurse  [facility]

## 2020-03-02 NOTE — PROGRESS NOTES
OR today  VSS  Sitting up in recliner with aisha collar on  I have called Ruslan Lares to provide a new aisha collar that fits better  Per nursing pt has worked with PT today  Dressing posterior neck with minute amount of bloody drainage  NOEMÍ = 20 cc  Swallowing well  Urine output = 775cc  States right arm pain is improved  3/5 RUE remains  C/o right trapezius spasm, just given valium      Carolina Mac, NP

## 2020-03-02 NOTE — ANESTHESIA POSTPROCEDURE EVALUATION
Procedure(s):  ANTERIOR CERVICAL DISCECTOMY WITH FUSION C4/5; POSSIBLE REMOVAL OF C4 PLATE/C-ARM/NUVASIVE  POSTERIOR C3 LAMINECTOMY; C2-7 FUSION/DTRAX.    general    Anesthesia Post Evaluation      Multimodal analgesia: multimodal analgesia used between 6 hours prior to anesthesia start to PACU discharge  Patient location during evaluation: bedside  Patient participation: complete - patient participated  Level of consciousness: awake  Pain score: 0  Pain management: adequate  Airway patency: patent  Anesthetic complications: no  Cardiovascular status: stable  Respiratory status: acceptable  Hydration status: acceptable  Post anesthesia nausea and vomiting:  controlled      Vitals Value Taken Time   /61 3/2/2020 11:57 AM   Temp 36.9 °C (98.5 °F) 3/2/2020 11:09 AM   Pulse 73 3/2/2020 12:06 PM   Resp 14 3/2/2020 12:06 PM   SpO2 100 % 3/2/2020 12:06 PM   Vitals shown include unvalidated device data.

## 2020-03-02 NOTE — ANESTHESIA PREPROCEDURE EVALUATION
Relevant Problems   No relevant active problems       Anesthetic History     PONV          Review of Systems / Medical History  Patient summary reviewed and pertinent labs reviewed    Pulmonary  Within defined limits                 Neuro/Psych   Within defined limits           Cardiovascular                  Exercise tolerance: >4 METS     GI/Hepatic/Renal  Within defined limits              Endo/Other        Arthritis     Other Findings              Physical Exam    Airway  Mallampati: III  TM Distance: 4 - 6 cm  Neck ROM: decreased range of motion   Mouth opening: Diminished (comment)     Cardiovascular               Dental  No notable dental hx       Pulmonary  Breath sounds clear to auscultation               Abdominal  GI exam deferred       Other Findings            Anesthetic Plan    ASA: 2  Anesthesia type: general          Induction: Intravenous  Anesthetic plan and risks discussed with: Patient Shift report given to Andree Dunaway RN  at bedside. Patient care handed off in stable condition at this time.    Electronically signed by Deirdre Domínguez RN on 6/9/2019 at 7:18 PM

## 2020-03-02 NOTE — PROGRESS NOTES
Problem: Mobility Impaired (Adult and Pediatric)  Goal: *Acute Goals and Plan of Care (Insert Text)  Description  Physical Therapy Goals  Initiated 3/2/2020 and to be accomplished within 7 day(s)  1. Patient will move from supine to sit and sit to supine , scoot up and down and roll side to side in bed with modified independence. 2.  Patient will transfer from bed to chair and chair to bed with supervision/set-up using the least restrictive device. 3.  Patient will perform sit to stand with supervision/set-up. 4.  Patient will ambulate with supervision/set-up for 250 feet with the least restrictive device. Prior Level of Function:   Patient was independence for all mobility including gait without use of an assistive device. Patient lives with her  in a single story home with no steps to enter through garage. Patient has access to [facility] if necessary. Outcome: Progressing Towards Goal    PHYSICAL THERAPY EVALUATION    Patient: Coral Nagel (19 y.o. female)  Date: 3/2/2020  Primary Diagnosis: Kyphosis of cervical region, unspecified kyphosis type [M40.202]  Cervical spinal stenosis [M48.02]  Cervical myelopathy with cervical radiculopathy [M47.12]  Cervical kyphosis [M40.202]  Cervical stenosis of spine [M48.02]  Procedure(s) (LRB):  ANTERIOR CERVICAL DISCECTOMY WITH FUSION C4/5; POSSIBLE REMOVAL OF C4 PLATE/C-ARM/NUVASIVE (N/A)  POSTERIOR C3 LAMINECTOMY; C2-7 FUSION/DTRAX (N/A) Day of Surgery   Precautions:   Fall(cervical)    ASSESSMENT :  PT orders received and patient cleared by nursing to participate with therapy. Patient is a 68 y.o. female admitted to the hospital due to s/p ACDF C4/5, posterior C4/5/6 fusion by Dr. Cosme Alexander POD#0. Patient consents to PT evaluation and treatment. Patient received semi-reclined in bed, soft neck brace donned. Patient education regarding neck precautions, sleeping upright, performance of seated therapeutic exercise, and OOB mobility.  Patient performed supine to sit x2 SBA. On first attempt patient acknowledged pain and leaned back into [facility], then began again. Patient with good sitting balance at EOBl; good strength and AROM of B LE. Patient performed sit<>stand to RW CGA. Patient ambulated 5ft CGA to RW with decreased shaheed, decreased step length B. Patient required mod cuing to keep her eyes open with ambulation and to turn walker to center herself in front of chair before sitting. Patient performed stand to sit CGA. Session ended with patient seated upright in recliner, all needs in reach. Patient will benefit from skilled intervention to address the above impairments. Patient's rehabilitation potential is considered to be Good  Factors which may influence rehabilitation potential include:   []         None noted  []         Mental ability/status  []         Medical condition  []         Home/family situation and support systems  [x]         Safety awareness  [x]         Pain tolerance/management  []         Other:      PLAN :  Recommendations and Planned Interventions:   [x]           Bed Mobility Training             [x]    Neuromuscular Re-Education  [x]           Transfer Training                   []    Orthotic/Prosthetic Training  [x]           Gait Training                          [x]    Modalities  [x]           Therapeutic Exercises           [x]    Edema Management/Control  [x]           Therapeutic Activities            [x]    Family Training/Education  [x]           Patient Education  []           Other (comment):    Frequency/Duration: Patient will be followed by physical therapy 1-2 times per day/4-7 days per week to address goals. Discharge Recommendations: Home Health  Further Equipment Recommendations for Discharge: N/A     SUBJECTIVE:   Patient stated I'm sorry but I'm just not really thinking right right now.     OBJECTIVE DATA SUMMARY:     Past Medical History:   Diagnosis Date    Arthritis     rheumatoid arthritis    Chronic pain     Ill-defined condition     polymyalgia    Nausea & vomiting      Past Surgical History:   Procedure Laterality Date    COLONOSCOPY N/A 7/2/2019    [facility] ENDOSCOPY    HX APPENDECTOMY      HX LAP CHOLECYSTECTOMY      HX ORTHOPAEDIC      multiple arm surgeries    HX ORTHOPAEDIC      multiple neck surgeries    HX ORTHOPAEDIC      Multiple rib repair     HX ORTHOPAEDIC      coccyx sx    HX TONSILLECTOMY       Barriers to Learning/Limitations: yes;  altered mental status (i.e.Sedation, Confusion)  Compensate with: Visual Cues, Verbal Cues, and Tactile Cues  Home Situation:  Home Situation  Home Environment: Private residence  # Steps to Enter: 0  One/Two Story Residence: One story  Living Alone: No  Support Systems: Spouse/Significant Other/Partner  Patient Expects to be Discharged to[de-identified] Private residence  Current DME Used/Available at Home: Walker, rolling, Grab bars, [address], [address], straight, Commode, bedside  Critical Behavior:  Neurologic State: Confused;Drowsy  Orientation Level: Oriented X4  Cognition: Decreased attention/concentration; Follows commands  Safety/Judgement: Fall prevention  Psychosocial  Patient Behaviors: Confused;Calm  Purposeful Interaction: Yes  Pt Identified Daily Priority: Clinical issues (comment)  Caritas Process: Nurture loving kindness;Establish trust;Teaching/learning  Caring Interventions: Reassure; Therapeutic modalities  Reassure: Therapeutic listening; Informing;Caring rounds  Therapeutic Modalities: Intentional therapeutic touch  Skin Condition/Temp: Warm     Skin Integrity: Incision (comment)  Skin Integumentary  Skin Color: Appropriate for ethnicity  Skin Condition/Temp: Warm  Skin Integrity: Incision (comment)     B LE Strength:    Strength:  Within functional limits              B LE Tone & Sensation:   Tone: Normal          Sensation: Intact           B LE Range Of Motion:  AROM: Within functional limits Posture:  Posture (WDL): Exceptions to WDL  Posture Assessment: Forward head;Kyphosis;Rounded shoulders  Functional Mobility:  Bed Mobility:  Supine to Sit: Stand-by assistance  Scooting: Stand-by assistance  Transfers:  Sit to Stand: Contact guard assistance   Stand to Sit: Contact guard assistance     Balance:   Sitting: Intact  Standing: Impaired; With support  Standing - Static: Fair  Standing - Dynamic : Occasional    Ambulation/Gait Training:  Distance (ft): 5 Feet (ft)  Assistive Device: Walker, rolling  Ambulation - Level of Assistance: Minimal assistance  Base of Support: Center of gravity altered  Speed/Francine: Slow  Step Length: Right shortened;Left shortened      Therapeutic Exercises:   Reviewed and performed ankle pumps to increase blood flow and circulation. Pain:  Pain level pre-treatment: 8/10 muscle spasms of shoulders, back  Pain level post-treatment: same  Pain Intervention(s) : Medication (see MAR); Rest, Repositioning  Response to intervention: Nurse notified, See doc flow    Activity Tolerance:   Fair  Please refer to the flowsheet for vital signs taken during this treatment. After treatment:   [x]         Patient left in no apparent distress sitting up in chair  []         Patient left in no apparent distress in bed  [x]         Call bell left within reach  [x]   Personal items in reach   [x]         Nursing notified Nataliiaene Brake  []         Caregiver present  []         Bed/chair alarm activated  []         SCDs applied    COMMUNICATION/EDUCATION:   [x]         Role of Physical Therapy in the acute care setting. [x]         Fall prevention education was provided and the patient/caregiver indicated understanding. [x]         Patient/family have participated as able in goal setting and plan of care. [x]         Patient/family agree to work toward stated goals and plan of care. []         Patient understands intent and goals of therapy, but is neutral about his/her participation.   [] Patient is unable to participate in goal setting/plan of care: ongoing with therapy staff. [x]         Out of bed with nursing assistance 3-5 times a day. []         Other: Thank you for this referral.  Ramon Valentine DPT   Time Calculation: 26 mins      Eval Complexity: History: HIGH Complexity :3+ comorbidities / personal factors will impact the outcome/ POC Exam:HIGH Complexity : 4+ Standardized tests and measures addressing body structure, function, activity limitation and / or participation in recreation  Presentation: LOW Complexity : Stable, uncomplicated  Clinical Decision Making:Low Complexity    Overall Complexity:LOW      A student participated in this treatment session. Per CMS Medicare statements and guidelines I certify that the following was true:   1. I was present and directly observed the entire session. 2. I made all skilled judgments and clinical decisions regarding care. 3. I am the practitioner responsible for assessment, treatment, and documentation.     Thank you,   Sammi Ma, PT, DPT

## 2020-03-02 NOTE — PROGRESS NOTES
Dr Selvin Mccray wants pt to have an [place] collar. I called [facility] and spoke with Fernando Lamar  She states that they do not have any Aspen collars and it would take 2-3 days to get one in  They do have the [facility] J collar   They will be able to get the [facility] collar to patient in the morning.      Riya Potter, NP

## 2020-03-02 NOTE — PROGRESS NOTES
conducted a pre-surgery visit with Joyce Anderson, who is a 68 y.o.,female. The  provided the following Interventions:  Initiated a relationship of care and support. Plan:  Chaplains will continue to follow and will provide pastoral care on an as needed/requested basis.  recommends bedside caregivers page  on duty if patient shows signs of acute spiritual or emotional distress.     [facility]  [phone]
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
## 2020-03-02 NOTE — BRIEF OP NOTE
BRIEF OPERATIVE NOTE    Date of Procedure: 3/2/2020   Preoperative Diagnosis: Kyphosis of cervical region, unspecified kyphosis type [M40.202]  Cervical spinal stenosis [M48.02]  Postoperative Diagnosis: Kyphosis of cervical region, unspecified kyphosis type     Procedure(s):  ANTERIOR CERVICAL DISCECTOMY WITH FUSION C4/5; REMOVAL OF C4 PLATE/C-ARM/NUVASIVE  POSTERIOR C3/4/5/6 fusion,  lateral mass screws at C4/5/6   Surgeon(s) and Role:     * Param Escobar MD - Primary         Surgical Assistant: 0    Surgical Staff:  Circ-1: Dada Decker RN  Radiology Technician: Ramila Dc  Scrub Tech-1: Althea Ly  Scrub Tech-2: Anh Arrieta  Surg Asst-1: Butch Rodriguez  Event Time In Time Out   Incision Start 4861    Incision Close       Anesthesia: General   Estimated Blood Loss: 50  Specimens: * No specimens in log *   Findings: kyphosis, instability, congenital anatomic anomaly   Complications: 0  Implants:   Implant Name Type Inv.  Item Serial No.  Lot No. LRB No. Used Action   SPACER CERV TRIAD [id number] -- ALLOGRAFT - [id number]  SPACER CERV TRIAD [id number] -- ALLOGRAFT [id number] NUVASIVE NA N/A 1 Implanted

## 2020-03-02 NOTE — PROGRESS NOTES
Problem: Falls - Risk of  Goal: *Absence of Falls  Description  Document Jerrica Huffman Fall Risk and appropriate interventions in the flowsheet.   Outcome: Progressing Towards Goal  Note: Fall Risk Interventions:  Problem: Pain  Goal: *Control of Pain  Outcome: Progressing Towards Goal     Problem: Patient Education: Go to Patient Education Activity  Goal: Patient/Family Education  Outcome: Progressing Towards Goal     Problem: Patient Education: Go to Patient Education Activity  Goal: Patient/Family Education  Outcome: Progressing Towards Goal

## 2020-03-02 NOTE — INTERVAL H&P NOTE
H&P Update: 
Prachi Christine was seen and examined. History and physical has been reviewed. The patient has been examined.  There have been no significant clinical changes since the completion of the originally dated History and Physical.

## 2020-03-03 ENCOUNTER — TELEPHONE (OUTPATIENT)
Dept: ORTHOPEDIC SURGERY | Age: 70
End: 2020-03-03

## 2020-03-03 VITALS
RESPIRATION RATE: 18 BRPM | OXYGEN SATURATION: 98 % | SYSTOLIC BLOOD PRESSURE: 152 MMHG | TEMPERATURE: 96.7 F | DIASTOLIC BLOOD PRESSURE: 87 MMHG | WEIGHT: 172 LBS | HEART RATE: 100 BPM | BODY MASS INDEX: 26.07 KG/M2 | HEIGHT: 68 IN

## 2020-03-03 DIAGNOSIS — Z98.1 S/P CERVICAL SPINAL FUSION: Primary | ICD-10-CM

## 2020-03-03 PROCEDURE — 74011250636 HC RX REV CODE- 250/636: Performed by: ORTHOPAEDIC SURGERY

## 2020-03-03 PROCEDURE — 74011250637 HC RX REV CODE- 250/637: Performed by: ORTHOPAEDIC SURGERY

## 2020-03-03 PROCEDURE — 97116 GAIT TRAINING THERAPY: CPT

## 2020-03-03 PROCEDURE — 97165 OT EVAL LOW COMPLEX 30 MIN: CPT

## 2020-03-03 PROCEDURE — 51798 US URINE CAPACITY MEASURE: CPT

## 2020-03-03 PROCEDURE — 97530 THERAPEUTIC ACTIVITIES: CPT

## 2020-03-03 PROCEDURE — 97535 SELF CARE MNGMENT TRAINING: CPT

## 2020-03-03 RX ORDER — OXYCODONE HYDROCHLORIDE 5 MG/1
5 TABLET ORAL
Qty: 20 TAB | Refills: 0 | Status: SHIPPED | OUTPATIENT
Start: 2020-03-03 | End: 2020-03-08

## 2020-03-03 RX ADMIN — OXYCODONE HYDROCHLORIDE 10 MG: 5 TABLET ORAL at 12:50

## 2020-03-03 RX ADMIN — ACETAMINOPHEN 1000 MG: 500 TABLET ORAL at 00:24

## 2020-03-03 RX ADMIN — HYDROMORPHONE HYDROCHLORIDE 1 MG: 1 INJECTION, SOLUTION INTRAMUSCULAR; INTRAVENOUS; SUBCUTANEOUS at 02:32

## 2020-03-03 RX ADMIN — GABAPENTIN 300 MG: 300 CAPSULE ORAL at 08:44

## 2020-03-03 RX ADMIN — ACETAMINOPHEN 1000 MG: 500 TABLET ORAL at 05:51

## 2020-03-03 RX ADMIN — Medication 10 ML: at 13:56

## 2020-03-03 RX ADMIN — DIPHENHYDRAMINE HYDROCHLORIDE 12.5 MG: 50 INJECTION, SOLUTION INTRAMUSCULAR; INTRAVENOUS at 13:55

## 2020-03-03 RX ADMIN — DIAZEPAM 5 MG: 5 TABLET ORAL at 08:44

## 2020-03-03 RX ADMIN — SERTRALINE HYDROCHLORIDE 25 MG: 25 TABLET ORAL at 08:44

## 2020-03-03 RX ADMIN — DIAZEPAM 5 MG: 5 TABLET ORAL at 00:24

## 2020-03-03 RX ADMIN — DIAZEPAM 5 MG: 5 TABLET ORAL at 13:57

## 2020-03-03 RX ADMIN — CEFAZOLIN SODIUM 2 G: 2 SOLUTION INTRAVENOUS at 00:25

## 2020-03-03 RX ADMIN — Medication 10 ML: at 05:52

## 2020-03-03 NOTE — PROGRESS NOTES
I have called [address] [facility]. They will fit the patient for an aspen collar at her home tomorrow.   Pt may go home in a soft collar

## 2020-03-03 NOTE — OP NOTES
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 PROCEDURES PERFORMED:  Anterior cervical decompression and fusion C4-C5; anterior cervical plate fixation C4, C5, with NuVasive anterior cervical locking plate; removal of anterior cervical locking plate U4-8; posterior cervical fusion C3, C4, C5, C6; segmental spinal instrumentation; NuVasive lateral mass screws C4 and C5 on the right, C4, C5, and C6 on the left. SURGEON:  Do Padron MD    ASSISTANT:  None. ANESTHESIA:  General endotracheal.    COMPLICATIONS:  No complications. SPECIMENS REMOVED:  No specimens. IMPLANTS:  NuVasive instrumentation in the anterior and posterior cervical spine, a structural allograft anteriorly, cancellous allograft posteriorly. ESTIMATED BLOOD LOSS:  5 mL. FINDINGS:  The patient had gross instability at C4-5. We were able to improve overall cervical alignment. Decompressed the cord to provide an anterior column graft and anterior column fixation posteriorly. This is a very aberrant spinal anatomy. The patient has a rotatory deformity in her cervical spine. The initial plate that had been placed previously was placed onto the far right lateral surface. We tried to medialize the new plate, but there is clear rotatory deformity at 1-2 and 2-3. The facets posteriorly at C4-5 were not readily discernible on the left and barely discernible on the right due to advanced degenerative change. Lateral mass screws were placed at C4 bilaterally, C5 bilaterally, and C6 on the patient's left.   On the right, the lateral mass did not appear to have structural enough support to take a lateral mass screw, and at C3, there was degenerative collapse of the lateral masses to the point where a screw placed into C3 would involve 2 or go into to the foramina of 3-4 and it was avoided. C2 appeared to be auto-fused with large marginal osteophytes. I did not feel it needed to be addressed. DESCRIPTION OF PROCEDURE:  Following induction of general endotracheal anesthesia, the patient was placed in a supine position on a radiolucent table. The patient was prepped and draped in usual fashion. Right-sided approach was utilized. Sternocleidomastoid and great vessels mobilized laterally. The esophagus and trachea mobilized medially with blunt finger dissection. There was one large vein crossing the operative field that I hemoclipped and ligated. The previous plate was easily discernible. The screws were removed and the plate removed. The C4-5 disk space was defined, and anterior marginal scarification and spurring were debrided. The Brandon pins were placed in the bodies of 4 and 5. Self-retaining retractor blades were placed under the cover of longus colli musculature bilaterally, annular tissue incised, the radical diskectomy was done back to the posterior margin, posterior marginal osteophytes were thinned with a high-speed bur, then resected with a 4.0 Yulissa curette and sella punch. Posterior longitudinal ligament was taken. Thorough direct decompression of the epidural space was accomplished with direct visualization of the dura. The disk space that initially was kyphosed, it measured approximately 4 or 5 mm, took a 7-mm structural allograft that was tightly fit. C-arm image verified improved positioning overall of the cervical spine. An anterior cervical locking plate was placed with 2 screws in C4, 2 in C5, and the locking device engaged. Fixation was good. The wound was irrigated, no apparent bleeding. Vancomycin powder instilled for infection prophylaxis, the deep drain utilized. The cervical spine was closed in layers, and the skin with a subcuticular suture and Dermabond. Sterile occlusive dressing was placed upon the wound. All counts were correct. The patient was then turned in a prone position on the spinal frame. The patient was prepped and draped in usual fashion. Midline incision was made. Paramedian incisions were made in the cervical dorsal fascia, and subperiosteal dissection done of 3, 4, 5, and 6. The spinal anatomy was aberrant. There was a rotatory and angulatory deformity making it difficult to define facets. One could not really visualize with any clarity if this was the 4-5 facet on the left though I had a sense to where it is, it was not gapped at all and I could not so much as get a Penfield into it. On the right, the facet was more readily discernible and I was able to  Kurisu into it and define the facet anatomy. Lateral mass screws were then placed in the lateral masses of 4, 5, and 6 on the left and 4 and 5 on the right, attempted to place the screw in 6, found that the lateral mass could be deficient, and looking at C3 after the placements of screws in C4, there was simply no run on C3 that would not make you have to put screws into C2. Fixation appeared to be adequate. I decorticated the lateral masses as well as the lamina of 3, 4, 5, and 6. I placed allograft bone across this area confluently. Rods were placed within the screw heads, and final tightening and torquing done rigidly providing a posterior tension band in addition to the anterior column structural support provided. There is no active bleeding. Vancomycin powder instilled for infection prophylaxis. The cervical dorsal fascia was then closed with #1 Vicryl, subcutaneous tissue was closed with 2-0 Vicryl, skin closed with a 4-0 Monocryl subcuticular suture and Dermabond.   Sterile occlusive dressing was placed upon the wound. All counts were correct. Estimated blood loss less than 50 mL. No complications. No specimens.       MD TERRY [email or web address]  D:  03/02/2020 11:42  T:  03/02/2020 23:38  JOB #:  [id number]

## 2020-03-03 NOTE — PROGRESS NOTES
OT order received and chart reviewed. Patient seen for skilled OT evaluation and is safe for discharge home when medically stable/cleared by PT. Full note to follow.       Thank you for the referral.    Fidelina Garcia MS, OTR/L

## 2020-03-03 NOTE — PROGRESS NOTES
Problem: Self Care Deficits Care Plan (Adult)  Goal: *Acute Goals and Plan of Care (Insert Text)  Outcome: Resolved/Met   OCCUPATIONAL THERAPY EVALUATION/DISCHARGE    Patient: Bearl Snellen (71 y.o. female)  Date: 3/3/2020  Primary Diagnosis: Kyphosis of cervical region, unspecified kyphosis type [M40.202]  Cervical spinal stenosis [M48.02]  Cervical myelopathy with cervical radiculopathy [M47.12]  Cervical kyphosis [M40.202]  Cervical stenosis of spine [M48.02]  Procedure(s) (LRB):  ANTERIOR CERVICAL DISCECTOMY WITH FUSION C4/5; POSSIBLE REMOVAL OF C4 PLATE/C-ARM/NUVASIVE (N/A)  POSTERIOR C3 LAMINECTOMY; C2-7 FUSION/DTRAX (N/A) 1 Day Post-Op   Precautions:   Fall(cervical)  PLOF: Pt reports she lives with her  in a [facility]. Pt was (I) with basic self-care/ADLs, IADLs, and functional mobility without AD PTA. Pt has a shower chair, grab bars, and raised toilet seat at home. In addition, pt has a RW and SPC. ASSESSMENT AND RECOMMENDATIONS:  Pt cleared to participate in OT evaluation by Rn. Upon entering room, pt supine with HOB elevated, alert, and agreeable to therapy session. Based on the objective data described below, the patient presents she is motivated to return home as she is Mod(I) with basic self-care tasks and functional transfers. Prior to session, pt was provided a soft collar from RN. Pt c/o muscle spasms in site of procedure but willing to participate in OT eval. Reviewed with pt on cervical precautions and log roll technique in bed. Pt was able to perform cross legs method while following precautions to doff/don B socks in prep for further self care. Pt performed toileting tasks and transfers without AD with no LOB noted. Will defer to PT for further functional balance and mobility tasks. Issued pt a reacher for IADL retraining and educated pt on home modifications while following cervical precautions.  Pt reports she has a shower chair and grab bars to decrease the risk of falls, and raised toilet seat for easier transition in/out of toilet. In addition, pt has a supportive  to assist PRN. At the end of the session, pt resting comfortably in recliner, call bell in reacher, with all needs met. Pt does not require further skilled OT at this level of care. Discharge Recommendations: None  Further Equipment Recommendations for Discharge: N/A; Pt has all the recommended equipment to safely return home     SUBJECTIVE:   Patient stated i've been practicing    OBJECTIVE DATA SUMMARY:     Past Medical History:   Diagnosis Date    Arthritis     rheumatoid arthritis    Chronic pain     Ill-defined condition     polymyalgia    Nausea & vomiting      Past Surgical History:   Procedure Laterality Date    COLONOSCOPY N/A 7/2/2019    COLONOSCOPY performed by David Hernandez MD at [facility] ENDOSCOPY    HX APPENDECTOMY      HX LAP CHOLECYSTECTOMY      HX ORTHOPAEDIC      multiple arm surgeries    HX ORTHOPAEDIC      multiple neck surgeries    HX ORTHOPAEDIC      Multiple rib repair     HX ORTHOPAEDIC      coccyx sx    HX TONSILLECTOMY       Barriers to Learning/Limitations: None  Compensate with: visual, verbal, tactile, kinesthetic cues/model    Home Situation:   Home Situation  Home Environment: Private residence  # Steps to Enter: 0  One/Two Story Residence: One story  Living Alone: No  Support Systems: Spouse/Significant Other/Partner  Patient Expects to be Discharged to[de-identified] Private residence  Current DME Used/Available at Home: Vonna Papa, rolling, Grab bars, [place], straight, Commode, bedside  []     Right hand dominant   []     Left hand dominant    Cognitive/Behavioral Status:  Neurologic State: Alert  Orientation Level: Oriented X4  Cognition: Follows commands  Safety/Judgement: Fall prevention    Skin: Visible skin appeared intact  Edema: None noted        Coordination: BUE  Coordination: Within functional limits  Fine Motor Skills-Upper: Left Intact; Right Intact    Gross Motor Skills-Upper: Left Intact; Right Intact    Balance:  Sitting: Intact  Standing: Intact; Without support    Strength: BUE  Strength: Within functional limits    Tone & Sensation: BUE  Tone: Normal  Sensation: Intact    Range of Motion: BUE  AROM: Within functional limits      Functional Mobility and Transfers for ADLs:  Bed Mobility:  Supine to Sit: Modified independent  Scooting: Modified independent  Transfers:  Sit to Stand: Modified independent  Stand to Sit: Modified independent   Toilet Transfer : Modified independent     ADL Assessment:  Feeding: Modified independent    Oral Facial Hygiene/Grooming: Modified Independent    Bathing: Modified independent    Upper Body Dressing: Modified independent    Lower Body Dressing: Modified independent    Toileting: Modified independent      ADL Intervention:  Grooming at sink level: Modified Independent    Lower Body Dressing Assistance  Dressing Assistance: Modified independent  Pants With Button/Zipper: Modified independent  Leg Crossed Method Used: Yes (bringing foot to opposite knee to follow cervical precautions)  Position Performed: Seated in chair    Toileting  Toileting Assistance: Modified independent  Clothing Management: Modified independent    Issued and educated pt on use of a reacher for IADL retraining and home modification while following cervical precautions; pt acknowledged understanding. Cognitive Retraining  Safety/Judgement: Fall prevention      Pain:  Pain level pre-treatment: 7/10 (Pt reports muscle spasms in neck)  Pain level post-treatment: 7/10   Pain Intervention(s): Medication (see MAR); Rest, Ice, Repositioning  Response to intervention: Nurse notified, See doc flow    Activity Tolerance:   Good    Please refer to the flowsheet for vital signs taken during this treatment.   After treatment:   [x]  Patient left in no apparent distress sitting up in chair  []  Patient left in no apparent distress in bed  [x]  Call bell left within reach  [x]  Nursing notified  [] Caregiver present  []  Bed alarm activated    COMMUNICATION/EDUCATION:   [x]      Role of Occupational Therapy in the acute care setting  [x]      Home safety education was provided and the patient/caregiver indicated understanding. [x]      Patient/family have participated as able and agree with findings and recommendations. []      Patient is unable to participate in plan of care at this time. Thank you for this referral.  David Medina, MS, OTR/L  Time Calculation: 25 mins      Eval Complexity: History: LOW Complexity : Brief history review ; Examination: LOW Complexity : 1-3 performance deficits relating to physical, cognitive , or psychosocial skils that result in activity limitations and / or participation restrictions ;    Decision Making:LOW Complexity : No comorbidities that affect functional and no verbal or physical assistance needed to complete eval tasks

## 2020-03-03 NOTE — PROGRESS NOTES
Problem: Mobility Impaired (Adult and Pediatric)  Goal: *Acute Goals and Plan of Care (Insert Text)  Description  Physical Therapy Goals  Initiated 3/2/2020 and to be accomplished within 7 day(s)  1. Patient will move from supine to sit and sit to supine , scoot up and down and roll side to side in bed with modified independence. 2.  Patient will transfer from bed to chair and chair to bed with supervision/set-up using the least restrictive device. 3.  Patient will perform sit to stand with supervision/set-up. 4.  Patient will ambulate with supervision/set-up for 250 feet with the least restrictive device. Prior Level of Function:   Patient was independence for all mobility including gait without use of an assistive device. Patient lives with her  in a single story home with no steps to enter through garage. Patient has access to  and [address] [place] [address] if necessary. Outcome: Progressing Towards Goal   PHYSICAL THERAPY TREATMENT    Patient: Milind Saldana (39 y.o. female)  Date: 3/3/2020  Diagnosis: Kyphosis of cervical region, unspecified kyphosis type [M40.202]  Cervical spinal stenosis [M48.02]  Cervical myelopathy with cervical radiculopathy [M47.12]  Cervical kyphosis [M40.202]  Cervical stenosis of spine [M48.02]   <principal problem not specified>  Procedure(s) (LRB):  ANTERIOR CERVICAL DISCECTOMY WITH FUSION C4/5; POSSIBLE REMOVAL OF C4 PLATE/C-ARM/NUVASIVE (N/A)  POSTERIOR C3 LAMINECTOMY; C2-7 FUSION/DTRAX (N/A) 1 Day Post-Op  Precautions: Fall(cervical)      ASSESSMENT:  Pt found supine HOB elevated willing to participate w/ therapy. Pt able to make great mobility progress this visit, all while voicing good recall and maintaining all spinal precautions. Upon entry of first visit, new cervical bracing not available at this time as pt unsupported in supine. For safety of pt, pt provided education on spinal precautions w/ demonstrations and examples.  Pt voiced understanding and motivated to participate w/ arrival of new brace. On 2nd visit, new collar remains absent, however pt properly supported this time w/ soft collar. Pt voiced good recall of precautions and was able to perform all tasks at a SBA to mod I level. Only cueing pt req was proper sequencing for log roll technique to mitigate pain and stress through neck. Pt amb w/o AD w/ no LOB and no abnormal sway for 300'. Pt also safely performed 4 steps safely and returned to room to EOB. Additional time spent for education on proper posture, importance of maintaining precautions to facilitate healing, and strengthening there-ex to facilitate posture. From a PT standpoint, pt is safe to return home and would benefit from HHPT for safety assessment. Nursing notified. Progression toward goals: good   [x]      Improving appropriately and progressing toward goals  []      Improving slowly and progressing toward goals  []      Not making progress toward goals and plan of care will be adjusted     PLAN:  Patient continues to benefit from skilled intervention to address the above impairments. Continue treatment per established plan of care. Discharge Recommendations:  Home Health  Further Equipment Recommendations for Discharge:  none     SUBJECTIVE:   Patient stated I been through a lot of surgeries.     OBJECTIVE DATA SUMMARY:   Critical Behavior:  Neurologic State: Confused, Drowsy  Orientation Level: Oriented X4  Cognition: Decreased attention/concentration, Follows commands  Safety/Judgement: Fall prevention  Functional Mobility Training:  Bed Mobility:  Supine to Sit: Modified independent  Scooting: Modified independent  Transfers:  Sit to Stand: Supervision;Modified independent  Stand to Sit: Supervision;Modified independent  Balance:  Sitting: Intact  Standing: Intact; Without support  Ambulation/Gait Training:  Distance (ft): 300 Feet (ft)  Assistive Device: (none)  Ambulation - Level of Assistance: Stand-by assistance;Supervision  Gait Abnormalities: Decreased step clearance  Base of Support: Center of gravity altered  Speed/Francine: Pace decreased (<100 feet/min)  Step Length: Right shortened;Left shortened  Stairs:  Number of Stairs Trained: 4  Stairs - Level of Assistance: Supervision  Rail Use: Right   Therapeutic Exercises:       EXERCISE   Sets   Reps   Active Active Assist   Passive Self ROM   Comments   Scap squeezes  1 10  [x] [] [] []    Glut Sets 1 10  [x] [] [] [] Hold for 5 secs                                                                                                 Pain:  Pain level pre-treatment: 0/10  Pain level post-treatment: 0/10   Did req adjustment of collar for discomfort once upright    Activity Tolerance:   Good   Please refer to the flowsheet for vital signs taken during this treatment. After treatment:   [] Patient left in no apparent distress sitting up in chair  [x] Patient left in no apparent distress in bed w/ HOB elevated   [x] Call bell left within reach  [x] Nursing notified  [] Caregiver present  [] Bed alarm activated  [] SCDs applied      COMMUNICATION/EDUCATION:   [x]         Role of Physical Therapy in the acute care setting. [x]         Fall prevention education was provided and the patient/caregiver indicated understanding. [x]         Patient/family have participated as able in working toward goals and plan of care. [x]         Patient/family agree to work toward stated goals and plan of care. []         Patient understands intent and goals of therapy, but is neutral about his/her participation.   []         Patient is unable to participate in stated goals/plan of care: ongoing with therapy staff.  []         Other:        Lane Ulrich PTA   Time Calculation: 23 mins

## 2020-03-03 NOTE — PROGRESS NOTES
Katherine Blas post cervical surgery. Educated patient/family:    Activity:   Walk every hours and eat all meals in a chair. walking every hour to prevent clots. Turn head slowly from side to side for ROM. Sleep with HOB elevated for the next 3 nights to prevent swelling in neck. Follow neck precautions (no raising hands above head, no lifting, no bending & log roll in/out of bed)  VTE prophylaxis:  SCD on both legs when not walking. Ankle pumps 10 x per hour in hospital & at home. Pain Control:  Pain medications side effects discussed. Wean off narcotics ASAP. Use Tylenol ( 3000 mg/24 hours) , distraction, & change position to help with pain. Swallow every hour to help sore throat pain. Don't get nauseated. Eat a snack before taking pain medication    Do not get constipated: take stool softener/mild laxative daily while on narcotics. Wearing collar for comfort & reminder not to move neck up/down. Incentive Spirometry:   Use of incentive spirometer 10 x/hr. Demonstration  1000   ml x 3  Wound Care:   Dressing dry and intact. Leave dressing alone unless comes loose at home. Take off old dressing & put clean guaze over wound. Instructed to shower per MD protocol once home. Keep wound dry. Keep incision clean and dry. No lotions, powders, creams to surgical leg. .    Patient Safety:   Call light & belongings in reach. Call for help when want to walk or get OOB. Diet: Following swallowing precautions  Eat for healing. Soft foods with protein for healing. Eat small bites  Drink 8 glasses of water a day. Drink lots of fluids through a straw. Report to staff or surgeon any trouble swallowing. Educational material given. Patient verbalized understanding:Use of incentive spirometer, ankle pumping, doing exercises twice a day, taking medication to prevent constipation, how to manage their wound, drinking lots of water and eating protein.    Given the opportunity for asking questions. Belongings left in reach, call light in reach.

## 2020-03-03 NOTE — DISCHARGE SUMMARY
Discharge  Summary     Patient: Prachi Christine MRN: [id number]  SSN: [id number]    YOB: 1950  Age: 68 y.o. Sex: female       Admit Date: 3/2/2020    Discharge Date: 3/3/2020      Admission Diagnoses: Kyphosis of cervical region, unspecified kyphosis type [M40.202]  Cervical spinal stenosis [M48.02]  Cervical myelopathy with cervical radiculopathy [M47.12]  Cervical kyphosis [M40.202]  Cervical stenosis of spine [M48.02]    Discharge Diagnoses:   Problem List as of 3/3/2020 Date Reviewed: 2/11/2020          Codes Class Noted - Resolved    Cervical myelopathy with cervical radiculopathy ICD-10-CM: M47.12  ICD-9-CM: 721.1  3/2/2020 - Present        Cervical stenosis of spine ICD-10-CM: M48.02  ICD-9-CM: 723.0  3/2/2020 - Present        Cervical kyphosis ICD-10-CM: M40.202  ICD-9-CM: 737.10  3/2/2020 - Present        HNP (herniated nucleus pulposus), lumbar ICD-10-CM: M51.26  ICD-9-CM: 722.10  5/31/2017 - Present        Neuritis of left lower extremity ICD-10-CM: G57.92  ICD-9-CM: 355.8  5/31/2017 - Present        Neuritis ICD-10-CM: M79.2  ICD-9-CM: 729.2  4/11/2017 - Present        Lumbar facet arthropathy ICD-10-CM: M47.816  ICD-9-CM: 721.3  4/11/2017 - Present        Sacroiliac joint pain ICD-10-CM: M53.3  ICD-9-CM: 724.6  4/11/2017 - Present        DDD (degenerative disc disease), lumbar ICD-10-CM: M51.36  ICD-9-CM: 722.52  4/11/2017 - Present        Low back pain ICD-10-CM: M54.5  ICD-9-CM: 724.2  4/11/2017 - Present               Discharge Condition: Good    Procedure:  Anterior cervical decompression and fusion C4-C5; anterior cervical plate fixation C4, C5, with NuVasive anterior cervical locking plate; removal of anterior cervical locking plate Q9-8; posterior cervical fusion C3, C4, C5, C6; segmental spinal instrumentation; NuVasive lateral mass screws C4 and C5 on the right, C4, C5, and C6 on the left.       Hospital Course: Normal hospital course for this procedure.   Tolerated surgical intervention well. Incision dry and intact. Ambulatory. 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 I certify that the patient needs intermittent skilled nursing care, physical therapy and/or speech therapy. I will be following this patient in the Community and will be responsible for reviewing and signing the [address] 2. Initial Orders for Care: Must be completed only if Face to Face MD will not be signing the [address] Skilled Nursing and Physical Therapy    3. I certify that this patient is homebound for the following reason(s): Requires considerable and taxing effort to leave the home     4. I certify that this patient is under my care and that I, or a nurse practitioner or [facility] working with me, had a Face-to-Face Encounter that meets the physician Face-to-Face Encounter requirements. Document the physical findings from the Face-to-Face Encounter that support the need for skilled services: Has wound that requires skilled nursing assessment and treatment     Dagmar Rainey NP  3/3/2020          Discharge Medications:   Current Discharge Medication List      START taking these medications    Details   oxyCODONE IR (ROXICODONE) 5 mg immediate release tablet Take 1 Tab by mouth every six (6) hours as needed for Pain for up to 5 days.  Max Daily Amount: 20 mg.  Qty: 20 Tab, Refills: 0    Associated Diagnoses: S/P cervical spinal fusion         CONTINUE these medications which have NOT CHANGED    Details   traMADol (ULTRAM) 50 mg tablet tramadol 50 mg tablet   Take 1 tablet twice a day by oral route as needed. gabapentin (NEURONTIN) 300 mg capsule Take 1 Cap by mouth daily AND 2 Caps every evening. Max Daily Amount: 900 mg. Qty: 90 Cap, Refills: 3    Associated Diagnoses: HNP (herniated nucleus pulposus), lumbar; Neuritis of left lower extremity      celecoxib (CELEBREX) 200 mg capsule Take 1 Cap by mouth two (2) times daily as needed for Pain for up to 90 days. Qty: 60 Cap, Refills: 1    Associated Diagnoses: Cervical facet joint syndrome      sertraline (ZOLOFT) 25 mg tablet Take 1 Tab by mouth daily. Indications: anxiousness associated with depression  Refills: 0      pramipexole (MIRAPEX) 0.5 mg tablet Take 0.5 mg by mouth nightly. multivitamin (ONE A DAY) tablet Take 1 Tab by mouth daily. cholecalciferol, vitamin D3, (VITAMIN D3) 2,000 unit tab Take 1 Tab by mouth daily. tiZANidine (ZANAFLEX) 2 mg tablet Take 1 Tab by mouth three (3) times daily as needed for Pain. Qty: 45 Tab, Refills: 0      DULoxetine (CYMBALTA) 60 mg capsule Take 1 Cap by mouth daily. ibuprofen-famotidine (DUEXIS) 800-26.6 mg tab Take 1 Tab by mouth three (3) times daily as needed. metaxalone (SKELAXIN) 800 mg tablet Take 1 Tab by mouth every evening. diclofenac (VOLTAREN) 1 % gel Apply 4 g to affected area four (4) times daily. Apply 4 gm to left knee qid for osteoarthritis  Qty: 5 Each, Refills: 1    Associated Diagnoses: Osteoarthritis of left knee, unspecified osteoarthritis type      pravastatin (PRAVACHOL) 80 mg tablet Take 80 mg by mouth nightly. Omega-3 Fatty Acids (FISH OIL) 500 mg cap Take 1 Cap by mouth daily. STOP taking these medications       ibuprofen (ADVIL) 200 mg tablet Comments:   Reason for Stopping: Follow-up Appointments   Procedures    FOLLOW UP VISIT Appointment in: Two Weeks     Standing Status:   Standing     Number of Occurrences:   1     Order Specific Question:   Appointment in     Answer:    Two Weeks       Signed By: Pennie López NP     March 3, 2020

## 2020-03-03 NOTE — PROGRESS NOTES
Problem: Falls - Risk of  Goal: *Absence of Falls  Description  Document Juanita Thrasher Fall Risk and appropriate interventions in the flowsheet.   Outcome: Progressing Towards Goal  Note: Fall Risk Interventions:  Mobility Interventions: Patient to call before getting OOB    Medication Interventions: Patient to call before getting OOB, Teach patient to arise slowly    Elimination Interventions: Patient to call for help with toileting needs, Toileting schedule/hourly rounds    History of Falls Interventions: Room close to nurse's station    Problem: Patient Education: Go to Patient Education Activity  Goal: Patient/Family Education  Outcome: Progressing Towards Goal     Problem: Pain  Goal: *Control of Pain  Outcome: Progressing Towards Goal     Problem: Patient Education: Go to Patient Education Activity  Goal: Patient/Family Education  Outcome: Progressing Towards Goal     Problem: Patient Education: Go to Patient Education Activity  Goal: Patient/Family Education  Outcome: Progressing Towards Goal

## 2020-03-03 NOTE — ROUTINE PROCESS
Bedside and Verbal shift change report given to [facility] (oncoming nurse) by Blaise Gowers (offgoing nurse). Report included the following information SBAR, Kardex, Intake/Output and MAR.
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
## 2020-03-03 NOTE — PROGRESS NOTES
vss afeb  Neuro intact  Doing well  Arms improved  For new [place] J collar this am- if it fits  Otherwise will use soft collar  Pt ot  Dc home  Fu 2 weeks

## 2020-03-06 ENCOUNTER — TELEPHONE (OUTPATIENT)
Dept: ORTHOPEDIC SURGERY | Age: 70
End: 2020-03-06

## 2020-03-06 RX ORDER — CYCLOBENZAPRINE HCL 5 MG
5 TABLET ORAL
Qty: 21 TAB | Refills: 0 | Status: SHIPPED | OUTPATIENT
Start: 2020-03-06 | End: 2020-03-16 | Stop reason: SDUPTHER

## 2020-03-06 NOTE — TELEPHONE ENCOUNTER
Patient's  [place] (on [employer]) called stating that the patient had surgery on Monday and is in a lot of pain with muscle spasms. He was wondering if there is something she can take to help her with the pain and spasms.  Please advise Kenny at [phone]

## 2020-03-06 NOTE — TELEPHONE ENCOUNTER
Spoke with patient spouse, informed of NP Daija message below. He stated understanding, no further actions needed at this time.

## 2020-03-12 NOTE — PROGRESS NOTES
[facility]  [address] [facility] ([place], [place]) [address]  [place], [address]  [phone] fax    Physical Therapy Discharge Summary    Patient name: Kira Grissom Start of Care: 2019   Referral source: Anais Garzon NP : 1950   Medical/Treatment Diagnosis: Neck pain [M54.2]  Low back pain [M54.5]  Payor: Donny Zhong / Plan: [place] MEDICARE PART A & B / Product Type: Medicare /  Onset Date:6 months, gradually worsening over last 2-3 months       Prior Hospitalization: see medical history Provider#: [id number]   Medications: Verified on Patient Summary List     Comorbidities: 4 x neck surgeries including C5-7 fusion, another fusion of an unspecified level, another cervical surgery with hardware that pt was unsure of, C3-4 ant fusion, C5-6-7 fusion, 3 surgeries in right forearm including nerve involvement per pt, 2x right elbow surgeries, removed rib on right side, coccyx fracture, otherwise healthy per pt report   Prior Level of Function: independent and mobile without significant limitations                          Visits from Start of Care: 1    Missed Visits: 0  Reporting Period : 2019 to 2019    Summary of Care:    ASSESSMENT/RECOMMENDATIONS: As of evaluation, pt had been placed on hold pending f/u with her spine specialist. She has since not f/u in PT. Unable to further assess progress towards goals at this time due to non-compliance/lack of attendance. DC at this time with no further instructions to the patient.   Thank you for this referral.    [x]Discontinue therapy: []Patient has reached or is progressing toward set goals      [x]Patient has abdicated      []Due to lack of appreciable progress towards set goals    Ángela Guardado, PT, DPT, ATC 3/12/2020 5:54 PM

## 2020-03-16 ENCOUNTER — OFFICE VISIT (OUTPATIENT)
Dept: ORTHOPEDIC SURGERY | Age: 70
End: 2020-03-16

## 2020-03-16 VITALS
SYSTOLIC BLOOD PRESSURE: 126 MMHG | HEART RATE: 110 BPM | HEIGHT: 68 IN | TEMPERATURE: 97.5 F | DIASTOLIC BLOOD PRESSURE: 64 MMHG | BODY MASS INDEX: 26.15 KG/M2 | RESPIRATION RATE: 16 BRPM | OXYGEN SATURATION: 100 %

## 2020-03-16 DIAGNOSIS — Z98.1 S/P CERVICAL SPINAL FUSION: ICD-10-CM

## 2020-03-16 DIAGNOSIS — M48.02 CERVICAL SPINAL STENOSIS: Primary | ICD-10-CM

## 2020-03-16 RX ORDER — CYCLOBENZAPRINE HCL 5 MG
5 TABLET ORAL
Qty: 45 TAB | Refills: 0 | Status: SHIPPED | OUTPATIENT
Start: 2020-03-16 | End: 2020-05-26 | Stop reason: SDUPTHER
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
## 2020-03-16 NOTE — PATIENT INSTRUCTIONS
Cervical Spinal Fusion: What to Expect at [facility] Your Recovery After surgery, you can expect your neck to feel stiff and sore. This should improve in the weeks after surgery. You may have trouble sitting or standing in one position for very long and may need pain medicine in the weeks after your surgery. You may need to wear a neck brace for a while. It may take 4 to 6 weeks to get back to your usual activities, but it may depend on what kind of surgery you had. Your doctor may advise you to work with a physical therapist to strengthen the muscles around your neck and back. This care sheet gives you a general idea about how long it will take for you to recover. But each person recovers at a different pace. Follow the steps below to get better as quickly as possible. How can you care for yourself at home? Activity 
  · Rest when you feel tired. Getting enough sleep will help you recover.  
  · Try to walk each day. Start by walking a little more than you did the day before. Bit by bit, increase the amount you walk. Walking boosts blood flow and helps prevent pneumonia and constipation. Walking may also decrease your muscle soreness after surgery.  
  · Follow your doctor's directions about not lifting anything that would strain your neck and back. This may include a child, heavy grocery bags and milk containers, a heavy briefcase or backpack, cat litter or dog food bags, or a vacuum .  
  · Avoid strenuous activities, such as bicycle riding, jogging, weightlifting, or aerobic exercise, until your doctor says it is okay.  
  · Do not drive for 2 to 4 weeks after your surgery or until your doctor says it is okay.  
  · Avoid taking long car trips for 2 to 4 weeks after surgery. Your neck may become tired and painful from sitting too long in one position.  
  · You will probably need to take 4 to 6 weeks off from work. It depends on the type of work you do and how you feel.   · You may have sex as soon as you feel able, but avoid positions that put stress on your neck or cause pain. Diet 
  · You can eat your normal diet. If your stomach is upset, try bland, low-fat foods like plain rice, broiled chicken, toast, and yogurt.  
  · Drink plenty of fluids. If you have kidney, heart, or liver disease and have to limit fluids, talk with your doctor before you increase the amount of fluids you drink.  
  · You may notice that your bowel movements are not regular right after your surgery. This is common. Try to avoid constipation and straining with bowel movements. You may want to take a fiber supplement every day. If you have not had a bowel movement after a couple of days, ask your doctor about taking a mild laxative. Medicines 
  · Your doctor will tell you if and when you can restart your medicines. He or she will also give you instructions about taking any new medicines.  
  · If you take aspirin or some other blood thinner, ask your doctor if and when to start taking it again. Make sure that you understand exactly what your doctor wants you to do.  
  · Be safe with medicines. Take pain medicines exactly as directed. ? If the doctor gave you a prescription medicine for pain, take it as prescribed. ? If you are not taking a prescription pain medicine, ask your doctor if you can take an over-the-counter medicine.  
  · If your doctor prescribed antibiotics, take them as directed. Do not stop taking them just because you feel better. You need to take the full course of antibiotics.  
  · If you think your pain pill is making you sick to your stomach: 
? Take your pills after meals (unless your doctor has told you not to). ? Ask your doctor for a different pain pill. Incision care 
  · You will be given specific instructions about how to care for the cut (incision) the doctor made. The instructions will depend on the type of materials used to close the cut. Exercise   · Do exercises as instructed by your doctor or physical therapist to improve your strength and flexibility. Other instructions 
  · To reduce stiffness and help sore muscles, use a warm water bottle, a heating pad set on low, or a warm cloth on your neck. Do not put heat right over the incision. Do not go to sleep with a heating pad on your skin. Follow-up care is a key part of your treatment and safety. Be sure to make and go to all appointments, and call your doctor if you are having problems. It's also a good idea to know your test results and keep a list of the medicines you take. When should you call for help? Call 911 anytime you think you may need emergency care. For example, call if: 
  · You passed out (lost consciousness).  
  · You have chest pain, are short of breath, or cough up blood.  
  · You are unable to move an arm or a leg at all.  
[facility] your doctor now or seek immediate medical care if: 
  · You have pain that does not get better after you take pain medicine.  
  · You have loose stitches, or your incision comes open.  
  · Bright red blood has soaked through the bandage over your incision.  
  · You have signs of a blood clot in your leg (called a deep vein thrombosis), such as: 
? Pain in your calf, back of the knee, thigh, or groin. ? Redness or swelling in your leg.  
  · You have signs of infection, such as: 
? Increased pain, swelling, warmth, or redness. ? Red streaks leading from the incision. ? Pus draining from the incision. ? A fever.  
  · You have new or worse symptoms in your arms, legs, chest, belly, or buttocks. Symptoms may include: 
? Numbness or tingling. ? Weakness. ? Pain.  
  · You lose bladder or bowel control.  
 Watch closely for any changes in your health, and be sure to contact your doctor if: 
  · You do not get better as expected. Where can you learn more? Go to http://candice-abby.info/ Enter V974 in the search box to learn more about \"Cervical Spinal Fusion: What to Expect at Home. \" Current as of: June 26, 2019Content Version: 12.4 © 0249-1627 Healthwise, Incorporated. Care instructions adapted under license by [employer] (which disclaims liability or warranty for this information). If you have questions about a medical condition or this instruction, always ask your healthcare professional. [place] [address] any warranty or liability for your use of this information.

## 2020-04-14 ENCOUNTER — VIRTUAL VISIT (OUTPATIENT)
Dept: ORTHOPEDIC SURGERY | Age: 70
End: 2020-04-14

## 2020-04-14 DIAGNOSIS — Z98.1 S/P CERVICAL SPINAL FUSION: Primary | ICD-10-CM
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
## 2020-04-14 NOTE — PATIENT INSTRUCTIONS
Neck: Exercises Introduction Here are some examples of exercises for you to try. The exercises may be suggested for a condition or for rehabilitation. Start each exercise slowly. Ease off the exercises if you start to have pain. You will be told when to start these exercises and which ones will work best for you. How to do the exercises Neck stretch 1. This stretch works best if you keep your shoulder down as you lean away from it. To help you remember to do this, start by relaxing your shoulders and lightly holding on to your thighs or your chair. 2. Tilt your head toward your shoulder and hold for 15 to 30 seconds. Let the weight of your head stretch your muscles. 3. If you would like a little added stretch, use your hand to gently and steadily pull your head toward your shoulder. For example, keeping your right shoulder down, lean your head to the left. 4. Repeat 2 to 4 times toward each shoulder. Diagonal neck stretch 1. Turn your head slightly toward the direction you will be stretching, and tilt your head diagonally toward your chest and hold for 15 to 30 seconds. 2. If you would like a little added stretch, use your hand to gently and steadily pull your head forward on the diagonal. 
3. Repeat 2 to 4 times toward each side. Dorsal glide stretch 1. Sit or stand tall and look straight ahead. 2. Slowly tuck your chin as you glide your head backward over your body 3. Hold for a count of 6, and then relax for up to 10 seconds. 4. Repeat 8 to 12 times. Chest and shoulder stretch 1. Sit or stand tall and glide your head backward as in the dorsal glide stretch. 2. Raise both arms so that your hands are next to your ears. 3. Take a deep breath, and as you breathe out, lower your elbows down and behind your back. You will feel your shoulder blades slide down and together, and at the same time you will feel a stretch across your chest and the front of your shoulders. 4. Hold for about 6 seconds, and then relax for up to 10 seconds. 5. Repeat 8 to 12 times. Strengthening: Hands on head 1. Move your head backward, forward, and side to side against gentle pressure from your hands, holding each position for about 6 seconds. 2. Repeat 8 to 12 times. Follow-up care is a key part of your treatment and safety. Be sure to make and go to all appointments, and call your doctor if you are having problems. It's also a good idea to know your test results and keep a list of the medicines you take. Where can you learn more? Go to http://candice-abby.info/ Enter P975 in the search box to learn more about \"Neck: Exercises. \" Current as of: June 26, 2019Content Version: 12.4 © 1282-7357 Healthwise, Incorporated. Care instructions adapted under license by [employer] (which disclaims liability or warranty for this information). If you have questions about a medical condition or this instruction, always ask your healthcare professional. [address] any warranty or liability for your use of this information. Neck Arthritis: Exercises Introduction Here are some examples of exercises for you to try. The exercises may be suggested for a condition or for rehabilitation. Start each exercise slowly. Ease off the exercises if you start to have pain. You will be told when to start these exercises and which ones will work best for you. How to do the exercises Neck stretches to the side 1. This stretch works best if you keep your shoulder down as you lean away from it. To help you remember to do this, start by relaxing your shoulders and lightly holding on to your thighs or your chair. 2. Tilt your head toward your shoulder and hold for 15 to 30 seconds. Let the weight of your head stretch your muscles. 3. Repeat 2 to 4 times toward each shoulder. Chin tuck 1. Lie on the floor with a rolled-up towel under your neck. Your head should be touching the floor. 2. Slowly bring your chin toward your chest. 
3. Hold for a count of 6, and then relax for up to 10 seconds. 4. Repeat 8 to 12 times. Active cervical rotation 1. Sit in a firm chair, or stand up straight. 2. Keeping your chin level, turn your head to the right, and hold for 15 to 30 seconds. 3. Turn your head to the left and hold for 15 to 30 seconds. 4. Repeat 2 to 4 times to each side. Shoulder blade squeeze 1. While standing, squeeze your shoulder blades together. 2. Do not raise your shoulders up as you are squeezing. 3. Hold for 6 seconds. 4. Repeat 8 to 12 times. Shoulder rolls 1. Sit comfortably with your feet shoulder-width apart. You can also do this exercise standing up. 2. Roll your shoulders up, then back, and then down in a smooth, circular motion. 3. Repeat 2 to 4 times. Follow-up care is a key part of your treatment and safety. Be sure to make and go to all appointments, and call your doctor if you are having problems. It's also a good idea to know your test results and keep a list of the medicines you take. Where can you learn more? Go to http://candice-abby.info/ Enter K084 in the search box to learn more about \"Neck Arthritis: Exercises. \" Current as of: June 26, 2019Content Version: 12.4 © 7894-5329 Healthwise, Incorporated. Care instructions adapted under license by [employer] (which disclaims liability or warranty for this information). If you have questions about a medical condition or this instruction, always ask your healthcare professional. [address] any warranty or liability for your use of this information. Healthy Upper Back: Exercises Introduction Here are some examples of exercises for your upper back. Start each exercise slowly. Ease off the exercise if you start to have pain. Your doctor or physical therapist will tell you when you can start these exercises and which ones will work best for you. How to do the exercises Lower neck and upper back stretch 1. Stretch your arms out in front of your body. Clasp one hand on top of your other hand. 2. Gently reach out so that you feel your shoulder blades stretching away from each other. 3. Gently bend your head forward. 4. Hold for 15 to 30 seconds. 5. Repeat 2 to 4 times. Midback stretch 1. Kneel on the floor, and sit back on your ankles. 2. Lean forward, place your hands on the floor, and stretch your arms out in front of you. Rest your head between your arms. 3. Gently push your chest toward the floor, reaching as far in front of you as possible. 4. Hold for 15 to 30 seconds. 5. Repeat 2 to 4 times. Shoulder rolls 1. Sit comfortably with your feet shoulder-width apart. You can also do this exercise while standing. 2. Roll your shoulders up, then back, and then down in a smooth, circular motion. 3. Repeat 2 to 4 times. Wall push-up 1. Stand against a wall with your feet about 12 to 24 inches back from the wall. If you feel any pain when you do this exercise, stand closer to the wall. 2. Place your hands on the wall slightly wider apart than your shoulders, and lean forward. 3. Gently lean your body toward the wall. Then push back to your starting position. Keep the motion smooth and controlled. 4. Repeat 8 to 12 times. Resisted shoulder blade squeeze 1. Sit or stand, holding the band in both hands in front of you. Keep your elbows close to your sides, bent at a 90-degree angle. Your palms should face up. 2. Squeeze your shoulder blades together, and move your arms to the outside, stretching the band. Be sure to keep your elbows at your sides while you do this. 3. Relax. 4. Repeat 8 to 12 times. Resisted rows 1.  Put the band around a solid object, such as a bedpost, at about waist level. Hold one end of the band in each hand. 2. With your elbows at your sides and bent to 90 degrees, pull the band back to move your shoulder blades toward each other. Return to the starting position. 3. Repeat 8 to 12 times. Follow-up care is a key part of your treatment and safety. Be sure to make and go to all appointments, and call your doctor if you are having problems. It's also a good idea to know your test results and keep a list of the medicines you take. Where can you learn more? Go to http://candice-abby.info/ Enter V888 in the search box to learn more about \"Healthy Upper Back: Exercises. \" Current as of: June 26, 2019Content Version: 12.4 © 1040-6297 Healthwise, Incorporated. Care instructions adapted under license by [employer] (which disclaims liability or warranty for this information). If you have questions about a medical condition or this instruction, always ask your healthcare professional. [address] any warranty or liability for your use of this information.

## 2020-04-28 DIAGNOSIS — G57.92 NEURITIS OF LEFT LOWER EXTREMITY: ICD-10-CM

## 2020-04-28 DIAGNOSIS — M51.26 HNP (HERNIATED NUCLEUS PULPOSUS), LUMBAR: ICD-10-CM

## 2020-04-28 RX ORDER — GABAPENTIN 300 MG/1
CAPSULE ORAL
Qty: 180 CAP | Refills: 0 | Status: SHIPPED | OUTPATIENT
Start: 2020-04-28 | End: 2020-06-10

## 2020-04-29 DIAGNOSIS — M47.812 CERVICAL FACET JOINT SYNDROME: ICD-10-CM

## 2020-04-30 RX ORDER — CELECOXIB 200 MG/1
CAPSULE ORAL
Qty: 60 CAP | Refills: 1 | Status: SHIPPED | OUTPATIENT
Start: 2020-04-30 | End: 2021-05-12

## 2020-05-26 ENCOUNTER — VIRTUAL VISIT (OUTPATIENT)
Dept: ORTHOPEDIC SURGERY | Age: 70
End: 2020-05-26

## 2020-05-26 DIAGNOSIS — M54.12 CERVICAL RADICULOPATHY: ICD-10-CM

## 2020-05-26 DIAGNOSIS — Z98.1 S/P CERVICAL SPINAL FUSION: Primary | ICD-10-CM

## 2020-05-26 RX ORDER — CYCLOBENZAPRINE HCL 5 MG
5 TABLET ORAL
Qty: 60 TAB | Refills: 1 | Status: SHIPPED | OUTPATIENT
Start: 2020-05-26 | End: 2020-12-09 | Stop reason: SDUPTHER
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
## 2020-05-26 NOTE — PATIENT INSTRUCTIONS
Healthy Upper Back: Exercises Introduction Here are some examples of exercises for your upper back. Start each exercise slowly. Ease off the exercise if you start to have pain. Your doctor or physical therapist will tell you when you can start these exercises and which ones will work best for you. How to do the exercises Lower neck and upper back stretch 1. Stretch your arms out in front of your body. Clasp one hand on top of your other hand. 2. Gently reach out so that you feel your shoulder blades stretching away from each other. 3. Gently bend your head forward. 4. Hold for 15 to 30 seconds. 5. Repeat 2 to 4 times. Midback stretch 1. Kneel on the floor, and sit back on your ankles. 2. Lean forward, place your hands on the floor, and stretch your arms out in front of you. Rest your head between your arms. 3. Gently push your chest toward the floor, reaching as far in front of you as possible. 4. Hold for 15 to 30 seconds. 5. Repeat 2 to 4 times. Shoulder rolls 1. Sit comfortably with your feet shoulder-width apart. You can also do this exercise while standing. 2. Roll your shoulders up, then back, and then down in a smooth, circular motion. 3. Repeat 2 to 4 times. Wall push-up 1. Stand against a wall with your feet about 12 to 24 inches back from the wall. If you feel any pain when you do this exercise, stand closer to the wall. 2. Place your hands on the wall slightly wider apart than your shoulders, and lean forward. 3. Gently lean your body toward the wall. Then push back to your starting position. Keep the motion smooth and controlled. 4. Repeat 8 to 12 times. Resisted shoulder blade squeeze 1. Sit or stand, holding the band in both hands in front of you. Keep your elbows close to your sides, bent at a 90-degree angle. Your palms should face up.  
2. Squeeze your shoulder blades together, and move your arms to the outside, stretching the band. Be sure to keep your elbows at your sides while you do this. 3. Relax. 4. Repeat 8 to 12 times. Resisted rows 1. Put the band around a solid object, such as a bedpost, at about waist level. Hold one end of the band in each hand. 2. With your elbows at your sides and bent to 90 degrees, pull the band back to move your shoulder blades toward each other. Return to the starting position. 3. Repeat 8 to 12 times. Follow-up care is a key part of your treatment and safety. Be sure to make and go to all appointments, and call your doctor if you are having problems. It's also a good idea to know your test results and keep a list of the medicines you take. Where can you learn more? Go to http://candice-abby.info/ Enter J344 in the search box to learn more about \"Healthy Upper Back: Exercises. \" Current as of: June 26, 2019Content Version: 12.4 © 8048-3016 [employer]wise, [employer]orporated. Care instructions adapted under license by [employer] (which disclaims liability or warranty for this information). If you have questions about a medical condition or this instruction, always ask your healthcare professional. [address] any warranty or liability for your use of this information.

## 2020-06-10 DIAGNOSIS — G57.92 NEURITIS OF LEFT LOWER EXTREMITY: ICD-10-CM

## 2020-06-10 DIAGNOSIS — M51.26 HNP (HERNIATED NUCLEUS PULPOSUS), LUMBAR: ICD-10-CM

## 2020-06-10 RX ORDER — GABAPENTIN 300 MG/1
CAPSULE ORAL
Qty: 180 CAP | Refills: 0 | Status: SHIPPED | OUTPATIENT
Start: 2020-06-10 | End: 2020-07-17

## 2020-07-16 DIAGNOSIS — G57.92 NEURITIS OF LEFT LOWER EXTREMITY: ICD-10-CM

## 2020-07-16 DIAGNOSIS — M51.26 HNP (HERNIATED NUCLEUS PULPOSUS), LUMBAR: ICD-10-CM

## 2020-07-17 RX ORDER — GABAPENTIN 300 MG/1
CAPSULE ORAL
Qty: 180 CAP | Refills: 1 | Status: SHIPPED | OUTPATIENT
Start: 2020-07-17 | End: 2020-10-14

## 2020-10-12 DIAGNOSIS — G57.92 NEURITIS OF LEFT LOWER EXTREMITY: ICD-10-CM

## 2020-10-12 DIAGNOSIS — M51.26 HNP (HERNIATED NUCLEUS PULPOSUS), LUMBAR: ICD-10-CM

## 2020-10-14 RX ORDER — GABAPENTIN 300 MG/1
CAPSULE ORAL
Qty: 180 CAP | Refills: 2 | Status: SHIPPED | OUTPATIENT
Start: 2020-10-14 | End: 2021-05-12

## 2020-12-07 RX ORDER — CYCLOBENZAPRINE HCL 5 MG
5 TABLET ORAL
Qty: 60 TAB | Refills: 1 | OUTPATIENT
Start: 2020-12-07

## 2020-12-07 NOTE — TELEPHONE ENCOUNTER
Last Visit: 5/26/20 with NP Ben Cabral  Next Appointment: Advised to follow-up in 3 months  Previous Refill Encounter(s): 5/26/20 #60 with 1 refill    Requested Prescriptions     Pending Prescriptions Disp Refills    cyclobenzaprine (FLEXERIL) 5 mg tablet 60 Tab 1     Sig: Take 1 Tab by mouth two (2) times daily as needed for Muscle Spasm(s).  Indications: muscle spasm

## 2020-12-09 ENCOUNTER — VIRTUAL VISIT (OUTPATIENT)
Dept: ORTHOPEDIC SURGERY | Age: 70
End: 2020-12-09
Payer: MEDICARE

## 2020-12-09 DIAGNOSIS — M62.838 MUSCLE SPASM: ICD-10-CM

## 2020-12-09 DIAGNOSIS — Z98.1 S/P CERVICAL SPINAL FUSION: Primary | ICD-10-CM

## 2020-12-09 DIAGNOSIS — M54.12 CERVICAL RADICULOPATHY: ICD-10-CM

## 2020-12-09 PROCEDURE — G8536 NO DOC ELDER MAL SCRN: HCPCS | Performed by: NURSE PRACTITIONER

## 2020-12-09 PROCEDURE — 1090F PRES/ABSN URINE INCON ASSESS: CPT | Performed by: NURSE PRACTITIONER

## 2020-12-09 PROCEDURE — 3288F FALL RISK ASSESSMENT DOCD: CPT | Performed by: NURSE PRACTITIONER

## 2020-12-09 PROCEDURE — G8432 DEP SCR NOT DOC, RNG: HCPCS | Performed by: NURSE PRACTITIONER

## 2020-12-09 PROCEDURE — 99213 OFFICE O/P EST LOW 20 MIN: CPT | Performed by: NURSE PRACTITIONER

## 2020-12-09 PROCEDURE — 3017F COLORECTAL CA SCREEN DOC REV: CPT | Performed by: NURSE PRACTITIONER

## 2020-12-09 PROCEDURE — G8428 CUR MEDS NOT DOCUMENT: HCPCS | Performed by: NURSE PRACTITIONER

## 2020-12-09 PROCEDURE — G8419 CALC BMI OUT NRM PARAM NOF/U: HCPCS | Performed by: NURSE PRACTITIONER

## 2020-12-09 PROCEDURE — G8400 PT W/DXA NO RESULTS DOC: HCPCS | Performed by: NURSE PRACTITIONER

## 2020-12-09 PROCEDURE — 1100F PTFALLS ASSESS-DOCD GE2>/YR: CPT | Performed by: NURSE PRACTITIONER

## 2020-12-09 RX ORDER — CYCLOBENZAPRINE HCL 5 MG
5 TABLET ORAL
Qty: 60 TAB | Refills: 2 | Status: SHIPPED | OUTPATIENT
Start: 2020-12-09 | End: 2021-05-12
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
## 2020-12-10 ENCOUNTER — TELEPHONE (OUTPATIENT)
Dept: ORTHOPEDIC SURGERY | Age: 70
End: 2020-12-10

## 2020-12-10 NOTE — TELEPHONE ENCOUNTER
PA is required for Cyclobenzaprine 5mg    Cover My Meds Key:  Aixa Mohamud Richey: Grace Hopkins - Rx #: [id number]

## 2020-12-14 NOTE — TELEPHONE ENCOUNTER
Patient called to inquire status of prior auth for below, please advise patient once submitted to insurance, [phone]

## 2021-05-12 RX ORDER — ASCORBIC ACID 500 MG
1000 TABLET ORAL DAILY
COMMUNITY

## 2021-05-12 RX ORDER — GABAPENTIN 300 MG/1
300 CAPSULE ORAL EVERY OTHER DAY
COMMUNITY
End: 2021-05-27

## 2021-05-12 RX ORDER — CALCIUM POLYCARBOPHIL 625 MG
1250 TABLET ORAL DAILY
COMMUNITY

## 2021-05-12 RX ORDER — CALCIUM CARBONATE/VITAMIN D3 600 MG-125
1 TABLET ORAL DAILY
COMMUNITY

## 2021-05-12 RX ORDER — MONTELUKAST SODIUM 4 MG/1
1 TABLET, CHEWABLE ORAL
COMMUNITY

## 2021-05-12 RX ORDER — DEXTROMETHORPHAN HYDROBROMIDE, GUAIFENESIN 5; 100 MG/5ML; MG/5ML
1300 LIQUID ORAL DAILY
COMMUNITY

## 2021-05-12 RX ORDER — PANTOPRAZOLE SODIUM 40 MG/1
40 TABLET, DELAYED RELEASE ORAL DAILY
COMMUNITY

## 2021-05-12 NOTE — PERIOP NOTES
PAT - SURGICAL PRE-ADMISSION INSTRUCTIONS    NAME:  Grover Shone                                                          TODAY'S DATE:  5/12/2021    SURGERY DATE:  5/18/2021                                  SURGERY ARRIVAL TIME:   TDA    1. Do NOT eat or drink anything, including candy or gum, after MIDNIGHT on 5/17 , unless you have specific instructions from your Surgeon or Anesthesia Provider to do so. 2. No smoking 24 hours before surgery. 3. No alcohol 24 hours prior to the day of surgery. 4. No recreational drugs for one week prior to the day of surgery. 5. Leave all valuables, including money/purse, at home. 6. Remove all jewelry, nail polish, makeup (including mascara); no lotions, powders, deodorant, or perfume/cologne/after shave. 7. Glasses/Contact lenses and Dentures may be worn to the hospital.  They will be removed prior to surgery. 8. Call your doctor if symptoms of a cold or illness develop within 24 ours prior to surgery. 9. AN ADULT MUST DRIVE YOU HOME AFTER OUTPATIENT SURGERY. 10. If you are having an OUTPATIENT procedure, please make arrangements for a responsible adult to be with you for 24 hours after your surgery. 11. If you are admitted to the hospital, you will be assigned to a bed after surgery is complete. Normally a family member will not be able to see you until you are in your assigned bed. 12. Visitation Restrictions Explained. Special Instructions:  Covid Test 5/13, Quarantine requirements discussed  Complete bowel prep per MD instructions. Patient Prep:    shower with anti-bacterial soap    These surgical instructions were reviewed with Jaspal Cuevas during the PAT phone call.

## 2021-05-13 ENCOUNTER — HOSPITAL ENCOUNTER (OUTPATIENT)
Dept: LAB | Age: 71
Discharge: HOME | End: 2021-05-13
Payer: MEDICARE

## 2021-05-13 PROCEDURE — U0003 INFECTIOUS AGENT DETECTION BY NUCLEIC ACID (DNA OR RNA); SEVERE ACUTE RESPIRATORY SYNDROME CORONAVIRUS 2 (SARS-COV-2) (CORONAVIRUS DISEASE [COVID-19]), AMPLIFIED PROBE TECHNIQUE, MAKING USE OF HIGH THROUGHPUT TECHNOLOGIES AS DESCRIBED BY CMS-2020-01-R: HCPCS

## 2021-05-14 LAB — SARS-COV-2, COV2NT: NOT DETECTED

## 2021-05-18 ENCOUNTER — HOSPITAL ENCOUNTER (OUTPATIENT)
Age: 71
Setting detail: OUTPATIENT SURGERY
Discharge: HOME | End: 2021-05-18
Attending: INTERNAL MEDICINE | Admitting: INTERNAL MEDICINE
Payer: MEDICARE

## 2021-05-18 VITALS
RESPIRATION RATE: 20 BRPM | DIASTOLIC BLOOD PRESSURE: 58 MMHG | SYSTOLIC BLOOD PRESSURE: 121 MMHG | TEMPERATURE: 97.6 F | WEIGHT: 180 LBS | HEART RATE: 74 BPM | OXYGEN SATURATION: 100 % | BODY MASS INDEX: 27.28 KG/M2 | HEIGHT: 68 IN

## 2021-05-18 PROCEDURE — 77030021593 HC FCPS BIOP ENDOSC BSC -A: Performed by: INTERNAL MEDICINE

## 2021-05-18 PROCEDURE — 74011250636 HC RX REV CODE- 250/636: Performed by: NURSE ANESTHETIST, CERTIFIED REGISTERED

## 2021-05-18 PROCEDURE — 88305 TISSUE EXAM BY PATHOLOGIST: CPT

## 2021-05-18 PROCEDURE — 74011250637 HC RX REV CODE- 250/637: Performed by: NURSE ANESTHETIST, CERTIFIED REGISTERED

## 2021-05-18 PROCEDURE — 76040000019: Performed by: INTERNAL MEDICINE

## 2021-05-18 PROCEDURE — 2709999900 HC NON-CHARGEABLE SUPPLY: Performed by: INTERNAL MEDICINE

## 2021-05-18 RX ORDER — ATROPINE SULFATE 0.1 MG/ML
0.5 INJECTION INTRAVENOUS
Status: CANCELLED | OUTPATIENT
Start: 2021-05-18 | End: 2021-05-19

## 2021-05-18 RX ORDER — FLUMAZENIL 0.1 MG/ML
0.2 INJECTION INTRAVENOUS
Status: CANCELLED | OUTPATIENT
Start: 2021-05-18 | End: 2021-05-18

## 2021-05-18 RX ORDER — FAMOTIDINE 20 MG/1
20 TABLET, FILM COATED ORAL ONCE
Status: COMPLETED | OUTPATIENT
Start: 2021-05-18 | End: 2021-05-18

## 2021-05-18 RX ORDER — SODIUM CHLORIDE 0.9 % (FLUSH) 0.9 %
5-40 SYRINGE (ML) INJECTION EVERY 8 HOURS
Status: CANCELLED | OUTPATIENT
Start: 2021-05-18

## 2021-05-18 RX ORDER — SODIUM CHLORIDE 0.9 % (FLUSH) 0.9 %
5-40 SYRINGE (ML) INJECTION AS NEEDED
Status: CANCELLED | OUTPATIENT
Start: 2021-05-18

## 2021-05-18 RX ORDER — DEXTROMETHORPHAN/PSEUDOEPHED 2.5-7.5/.8
1.2 DROPS ORAL
Status: CANCELLED | OUTPATIENT
Start: 2021-05-18

## 2021-05-18 RX ORDER — SODIUM CHLORIDE, SODIUM LACTATE, POTASSIUM CHLORIDE, CALCIUM CHLORIDE 600; 310; 30; 20 MG/100ML; MG/100ML; MG/100ML; MG/100ML
50 INJECTION, SOLUTION INTRAVENOUS CONTINUOUS
Status: DISCONTINUED | OUTPATIENT
Start: 2021-05-18 | End: 2021-05-18 | Stop reason: HOSPADM

## 2021-05-18 RX ORDER — NALOXONE HYDROCHLORIDE 0.4 MG/ML
0.4 INJECTION, SOLUTION INTRAMUSCULAR; INTRAVENOUS; SUBCUTANEOUS
Status: CANCELLED | OUTPATIENT
Start: 2021-05-18 | End: 2021-05-18

## 2021-05-18 RX ORDER — EPINEPHRINE 0.1 MG/ML
1 INJECTION INTRACARDIAC; INTRAVENOUS
Status: CANCELLED | OUTPATIENT
Start: 2021-05-18 | End: 2021-05-19

## 2021-05-18 RX ADMIN — SODIUM CHLORIDE, SODIUM LACTATE, POTASSIUM CHLORIDE, AND CALCIUM CHLORIDE 50 ML/HR: 600; 310; 30; 20 INJECTION, SOLUTION INTRAVENOUS at 11:55

## 2021-05-18 RX ADMIN — FAMOTIDINE 20 MG: 20 TABLET ORAL at 12:00
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
## 2021-05-18 NOTE — DISCHARGE INSTRUCTIONS
Patient Education        Sigmoidoscopy: What to Expect at [facility]     A sigmoidoscopy lets your doctor look inside the lower part of your large intestine. This is also called the colon. The doctor uses a lighted tube called a sigmoidoscope (or scope). This test let the doctor look for small growths (called polyps), cancer, bleeding, hemorrhoids, or other problems. The doctor also may have used the scope to remove polyps. Or he or she may have used it to take tissue samples that need to be tested. You shouldn't have any pain after the procedure. But it is normal to pass gas. You may have mild discomfort from having gas. If your doctor removed polyps, you will likely need to schedule a colonoscopy to look at the whole colon. This care sheet gives you a general idea about how long it will take for you to recover. But each person recovers at a different pace. Follow the steps below to get better as quickly as possible. How can you care for yourself at home? Activity    · Most people are able to return to work right away unless they have had a sedative during the procedure.     · You may need someone to drive you home if you have had a sedative. In most cases, you can drive yourself home. Diet    · You can eat your normal diet. If your stomach is upset, try bland, low-fat foods like plain rice, broiled chicken, toast, and yogurt.     · Be sure to drink plenty of liquids to replace those you have lost during the preparation for the procedure. Exercise    · You can return to normal exercise right away. Medicine    · Your doctor will tell you if and when you can restart your medicines. He or she will also give you instructions about taking any new medicines.     · If you take aspirin or some other blood thinner, ask your doctor if and when to start taking it again. Make sure that you understand exactly what your doctor wants you to do. Follow-up care is a key part of your treatment and safety.  Be sure to make and go to all appointments, and call your doctor if you are having problems. It's also a good idea to know your test results and keep a list of the medicines you take. When should you call for help? Call your doctor now or seek immediate medical care if:    · You have new or worse belly pain.     · You have blood in your stools. Watch closely for changes in your health, and be sure to contact your doctor if you have any problems. Where can you learn more? Go to http://www.[employer]/  Enter [id number] in the search box to learn more about \"Sigmoidoscopy: What to Expect at Home. \"  Current as of: April 15, 2020               Content Version: 12.8  © 2006-2021 [employer]. Care instructions adapted under license by [employer] (which disclaims liability or warranty for this information). If you have questions about a medical condition or this instruction, always ask your healthcare professional. [address] any warranty or liability for your use of this information.

## 2021-05-18 NOTE — PROCEDURES
[email or web address]  [place], [address]      Brief Procedure Note    Roma Batres  6/8/1809  [id number]    Date of Procedure: 5/18/2021    Preoperative diagnosis: Diarrhea:  787.91 - R19.7  Bloating / Gas:  787.3 - R14.2  Altered bowel function:  787.99 - R19.4  Encounter for screening for other viral diseases:  V73.0 - Z11.59    Postoperative diagnosis: Normal    Type of Anesthesia: MAC (Monitored anesthesia care)    Description of findings: same as post op dx    Procedure: Procedure(s):  SIGMOIDOSCOPY FLEXIBLE with Bx's    :  Dr. Monique Stallings MD    Assistant(s): Endoscopy Technician-1: Iwona Cook  Endoscopy RN-1: Kacey Emery RN    EBL:None    Specimens:   ID Type Source Tests Collected by Time Destination   1 : Sigmoid Bx's Preservative Sigmoid  Mitzi Avila MD 5/18/2021 1349 Pathology       Findings: See printed and scanned procedure note    Complications: None    Dr. Monique Stallings MD  5/18/2021  1:56 PM

## 2021-10-20 NOTE — DISCHARGE INSTRUCTIONS
Post-Operative Discharge Instructions after [facility]  [phone]      You will return to the office 2 weeks after surgery. (Appointment was made at the time you scheduled your surgery)    Call office or physician on-call for any of the following:  · Fever greater than 100.5  · Uncontrollable chills  · Drainage from incision  · Pain not controlled by pain medication  · New pain  · New weakness or progressive weakness  · Food sticking in throat or excessive coughing when swallowing    Stop all anti-inflammatories (arthritis medication) such as Ibuprofen, Motrin, Aleve, Voltaren, Relafen, Naproxen, Arthrotec, etc. for 12 weeks ONLY if you had a neck or back Fusion. You may take Tylenol or acetaminophen over the counter. May remove FATIMAH stockings when discharged from the hospital.    May shower on the third day after surgery. Leave dressing on while you shower; the dressing is waterproof. No need for a dressing on neck patients after the third day. Abstain from driving until your first post-operative visit. If you must drive, do not take pain medications that may alter your abilities. Neck collars are for comfort only. Neck patients feel better sleeping in a recliner or \"propped up\" position for a few days after surgery. This helps reduce the swelling. It is normal for neck patients to have some difficulty swallowing the first few days. Use a straw for all liquids. Cut up solid foods smaller than normal until you are swallowing without difficulty. Walking is the best therapy after back surgery. Avoid extremes of bending, twisting, or lifting. All post-operative surgical patients should function within the range of the pain. \"If it hurts - do not do it. \"    Thank you for choosing [facility] for your [facility] surgery.
DISPLAY PLAN FREE TEXT
DISPLAY PLAN FREE TEXT

## 2022-03-18 PROBLEM — G95.9 CERVICAL MYELOPATHY WITH CERVICAL RADICULOPATHY (HCC): Status: ACTIVE | Noted: 2020-03-02

## 2022-03-18 PROBLEM — M54.12 CERVICAL MYELOPATHY WITH CERVICAL RADICULOPATHY (HCC): Status: ACTIVE | Noted: 2020-03-02

## 2022-03-18 PROBLEM — M47.816 LUMBAR FACET ARTHROPATHY: Status: ACTIVE | Noted: 2017-04-11

## 2022-03-18 PROBLEM — M48.02 CERVICAL STENOSIS OF SPINE: Status: ACTIVE | Noted: 2020-03-02

## 2022-03-19 PROBLEM — M53.3 SACROILIAC JOINT PAIN: Status: ACTIVE | Noted: 2017-04-11

## 2022-03-19 PROBLEM — M51.26 HNP (HERNIATED NUCLEUS PULPOSUS), LUMBAR: Status: ACTIVE | Noted: 2017-05-31

## 2022-03-19 PROBLEM — M51.369 DDD (DEGENERATIVE DISC DISEASE), LUMBAR: Status: ACTIVE | Noted: 2017-04-11

## 2022-03-19 PROBLEM — M79.2 NEURITIS: Status: ACTIVE | Noted: 2017-04-11

## 2022-03-19 PROBLEM — M51.36 DDD (DEGENERATIVE DISC DISEASE), LUMBAR: Status: ACTIVE | Noted: 2017-04-11

## 2022-03-20 PROBLEM — M40.202 CERVICAL KYPHOSIS: Status: ACTIVE | Noted: 2020-03-02

## 2022-03-20 PROBLEM — G57.92 NEURITIS OF LEFT LOWER EXTREMITY: Status: ACTIVE | Noted: 2017-05-31

## 2022-03-20 PROBLEM — M54.50 LOW BACK PAIN: Status: ACTIVE | Noted: 2017-04-11

## 2023-04-18 NOTE — TELEPHONE ENCOUNTER
Group Topic:  [facility] Process Group    Date: 4/18/2023  Start Time:  9:00 AM  End Time: 10:00 AM  Facilitators: Emanuel Downs LPC; Kashif Vaz LCSW    Focus: Check in group  Number in attendance: 7    Check-In group is an opportunity for Patient's to have guided reflection on treatment progression. Patient documents relevant environmental stressors and supports while receiving peer-professional feedback. Patient is also assessed on readiness for discharge and follow-up treatment.        Patient was not in group. He was meeting with other staff in PHP.     Emanuel Downs, MS, LPC, SAC-IT           Patient called to report increasing muscle spasms in neck and \"right-sided heaviness with arm & leg\". She's finding it increasingly difficult to move her neck and with ambulation. She's having to \"slide\" her right foot and having balance issues. She has seen Dr. Carmen Butler and he feels there may be some scar tissue on her spinal cord. Patient would like reassurance that it's ok to wait until 1/6 for her next appt to review MRI. Please advise patient [phone].

## 2023-09-20 PROBLEM — E78.2 MIXED HYPERLIPIDEMIA: Status: ACTIVE | Noted: 2023-09-20

## 2023-09-20 PROBLEM — I65.29 CAROTID ARTERY STENOSIS: Status: ACTIVE | Noted: 2023-09-20

## 2023-09-20 RX ORDER — EPINEPHRINE 1 MG/ML
0.3 INJECTION, SOLUTION, CONCENTRATE INTRAVENOUS PRN
Status: CANCELLED | OUTPATIENT
Start: 2023-09-27

## 2023-09-20 RX ORDER — SODIUM CHLORIDE 9 MG/ML
INJECTION, SOLUTION INTRAVENOUS CONTINUOUS
Status: CANCELLED | OUTPATIENT
Start: 2023-09-27

## 2023-09-20 RX ORDER — ONDANSETRON 2 MG/ML
8 INJECTION INTRAMUSCULAR; INTRAVENOUS
Status: CANCELLED | OUTPATIENT
Start: 2023-09-27

## 2023-09-20 RX ORDER — ACETAMINOPHEN 325 MG/1
650 TABLET ORAL
Status: CANCELLED | OUTPATIENT
Start: 2023-09-27

## 2023-09-20 RX ORDER — ALBUTEROL SULFATE 90 UG/1
4 AEROSOL, METERED RESPIRATORY (INHALATION) PRN
Status: CANCELLED | OUTPATIENT
Start: 2023-09-27

## 2023-09-20 RX ORDER — DIPHENHYDRAMINE HYDROCHLORIDE 50 MG/ML
50 INJECTION INTRAMUSCULAR; INTRAVENOUS
Status: CANCELLED | OUTPATIENT
Start: 2023-09-27

## 2023-09-27 ENCOUNTER — HOSPITAL ENCOUNTER (OUTPATIENT)
Facility: HOSPITAL | Age: 73
Setting detail: INFUSION SERIES
End: 2023-09-27
Payer: MEDICARE

## 2023-09-27 VITALS
RESPIRATION RATE: 16 BRPM | TEMPERATURE: 97.7 F | HEART RATE: 71 BPM | SYSTOLIC BLOOD PRESSURE: 144 MMHG | OXYGEN SATURATION: 99 % | DIASTOLIC BLOOD PRESSURE: 70 MMHG

## 2023-09-27 DIAGNOSIS — I65.29 STENOSIS OF CAROTID ARTERY, UNSPECIFIED LATERALITY: ICD-10-CM

## 2023-09-27 DIAGNOSIS — E78.2 MIXED HYPERLIPIDEMIA: Primary | ICD-10-CM

## 2023-09-27 PROCEDURE — 6360000002 HC RX W HCPCS: Performed by: FAMILY MEDICINE

## 2023-09-27 PROCEDURE — 96372 THER/PROPH/DIAG INJ SC/IM: CPT

## 2023-09-27 RX ORDER — SODIUM CHLORIDE 9 MG/ML
INJECTION, SOLUTION INTRAVENOUS CONTINUOUS
OUTPATIENT
Start: 2024-03-27

## 2023-09-27 RX ORDER — ALBUTEROL SULFATE 90 UG/1
4 AEROSOL, METERED RESPIRATORY (INHALATION) PRN
OUTPATIENT
Start: 2024-03-27

## 2023-09-27 RX ORDER — ACETAMINOPHEN 325 MG/1
650 TABLET ORAL
OUTPATIENT
Start: 2024-03-27

## 2023-09-27 RX ORDER — ONDANSETRON 2 MG/ML
8 INJECTION INTRAMUSCULAR; INTRAVENOUS
OUTPATIENT
Start: 2024-03-27

## 2023-09-27 RX ORDER — EPINEPHRINE 1 MG/ML
0.3 INJECTION, SOLUTION, CONCENTRATE INTRAVENOUS PRN
OUTPATIENT
Start: 2024-03-27

## 2023-09-27 RX ORDER — DIPHENHYDRAMINE HYDROCHLORIDE 50 MG/ML
50 INJECTION INTRAMUSCULAR; INTRAVENOUS
OUTPATIENT
Start: 2024-03-27

## 2023-09-27 RX ADMIN — INCLISIRAN 284 MG: 284 INJECTION, SOLUTION SUBCUTANEOUS at 13:04

## 2024-03-27 ENCOUNTER — HOSPITAL ENCOUNTER (OUTPATIENT)
Facility: HOSPITAL | Age: 74
Setting detail: INFUSION SERIES
Discharge: HOME | End: 2024-03-30
Payer: MEDICARE

## 2024-03-27 VITALS
HEART RATE: 81 BPM | TEMPERATURE: 98.2 F | SYSTOLIC BLOOD PRESSURE: 131 MMHG | OXYGEN SATURATION: 98 % | RESPIRATION RATE: 18 BRPM | DIASTOLIC BLOOD PRESSURE: 84 MMHG

## 2024-03-27 DIAGNOSIS — E78.2 MIXED HYPERLIPIDEMIA: Primary | ICD-10-CM

## 2024-03-27 DIAGNOSIS — I65.29 STENOSIS OF CAROTID ARTERY, UNSPECIFIED LATERALITY: ICD-10-CM

## 2024-03-27 PROCEDURE — 6360000002 HC RX W HCPCS: Performed by: FAMILY MEDICINE

## 2024-03-27 PROCEDURE — 96372 THER/PROPH/DIAG INJ SC/IM: CPT

## 2024-03-27 RX ORDER — ALBUTEROL SULFATE 90 UG/1
4 AEROSOL, METERED RESPIRATORY (INHALATION) PRN
OUTPATIENT
Start: 2024-03-27

## 2024-03-27 RX ORDER — ACETAMINOPHEN 325 MG/1
650 TABLET ORAL
OUTPATIENT
Start: 2024-03-27

## 2024-03-27 RX ORDER — SODIUM CHLORIDE 9 MG/ML
INJECTION, SOLUTION INTRAVENOUS CONTINUOUS
OUTPATIENT
Start: 2024-03-27

## 2024-03-27 RX ORDER — DIPHENHYDRAMINE HYDROCHLORIDE 50 MG/ML
50 INJECTION INTRAMUSCULAR; INTRAVENOUS
OUTPATIENT
Start: 2024-03-27

## 2024-03-27 RX ORDER — ONDANSETRON 2 MG/ML
8 INJECTION INTRAMUSCULAR; INTRAVENOUS
OUTPATIENT
Start: 2024-03-27

## 2024-03-27 RX ORDER — EPINEPHRINE 1 MG/ML
0.3 INJECTION, SOLUTION, CONCENTRATE INTRAVENOUS PRN
OUTPATIENT
Start: 2024-03-27

## 2024-03-27 RX ADMIN — INCLISIRAN 284 MG: 284 INJECTION, SOLUTION SUBCUTANEOUS at 11:40

## 2024-03-27 NOTE — PROGRESS NOTES
[facility] Progress Note    Date: 2024    Name: Darío Cespedes    MRN: [id number]         : 1950      Ms. Cespedes to [facility], ambulatory at 1110, here for her Leqvio injection every 6 months.    Pt was assessed and education was provided. Pt reports no issues after receiving her first two Leqvio injections 6 months ago. .    Ms. Cespedes's vitals were reviewed.  Vitals:    24 1110   BP: 131/84   Pulse: 81   Resp: 18   Temp: 98.2 °F (36.8 °C)   SpO2: 98%     Iclisiran Sodium (Leqvio) 284mg SQ was administered to back of LEFT upper arm.    Ms. Cespedes tolerated well and had no complaints at this time.    Discharge instructions reviewed with patient, who expressed understanding. Patient's armband removed and shredded.    [facility] and  in stable condition at 1150. She is to return on 24 at 1100 for her next Leqvio injection.    Susan Rubio RN  2024  12:50 PM

## 2024-03-27 NOTE — ADDENDUM NOTE
Encounter addended by: Susan Rubio, RN on: 3/27/2024 1:02 PM   Actions taken: Flowsheet accepted, Patient Education documented on, Patient Education resolved, Care Plan modified, Care Plan progress modified, Care Plan reviewed, Clinical Note Signed

## 2024-09-26 RX ORDER — ACETAMINOPHEN 325 MG/1
650 TABLET ORAL
Status: CANCELLED | OUTPATIENT
Start: 2024-09-26

## 2024-09-26 RX ORDER — EPINEPHRINE 1 MG/ML
0.3 INJECTION, SOLUTION, CONCENTRATE INTRAVENOUS PRN
Status: CANCELLED | OUTPATIENT
Start: 2024-09-26

## 2024-09-26 RX ORDER — ONDANSETRON 2 MG/ML
8 INJECTION INTRAMUSCULAR; INTRAVENOUS
Status: CANCELLED | OUTPATIENT
Start: 2024-09-26

## 2024-09-26 RX ORDER — SODIUM CHLORIDE 9 MG/ML
INJECTION, SOLUTION INTRAVENOUS CONTINUOUS
Status: CANCELLED | OUTPATIENT
Start: 2024-09-26

## 2024-09-26 RX ORDER — ALBUTEROL SULFATE 90 UG/1
4 INHALANT RESPIRATORY (INHALATION) PRN
Status: CANCELLED | OUTPATIENT
Start: 2024-09-26

## 2024-09-26 RX ORDER — DIPHENHYDRAMINE HYDROCHLORIDE 50 MG/ML
50 INJECTION INTRAMUSCULAR; INTRAVENOUS
Status: CANCELLED | OUTPATIENT
Start: 2024-09-26

## 2024-09-27 ENCOUNTER — HOSPITAL ENCOUNTER (OUTPATIENT)
Facility: HOSPITAL | Age: 74
Setting detail: INFUSION SERIES
Discharge: HOME | End: 2024-09-30
Payer: MEDICARE

## 2024-09-27 VITALS
SYSTOLIC BLOOD PRESSURE: 119 MMHG | RESPIRATION RATE: 16 BRPM | HEART RATE: 75 BPM | TEMPERATURE: 97.7 F | DIASTOLIC BLOOD PRESSURE: 68 MMHG | OXYGEN SATURATION: 98 %

## 2024-09-27 DIAGNOSIS — I65.29 STENOSIS OF CAROTID ARTERY, UNSPECIFIED LATERALITY: ICD-10-CM

## 2024-09-27 DIAGNOSIS — E78.2 MIXED HYPERLIPIDEMIA: Primary | ICD-10-CM

## 2024-09-27 PROCEDURE — 96372 THER/PROPH/DIAG INJ SC/IM: CPT

## 2024-09-27 PROCEDURE — 6360000002 HC RX W HCPCS: Performed by: FAMILY MEDICINE

## 2024-09-27 RX ORDER — DIPHENHYDRAMINE HYDROCHLORIDE 50 MG/ML
50 INJECTION INTRAMUSCULAR; INTRAVENOUS
OUTPATIENT
Start: 2025-03-28

## 2024-09-27 RX ORDER — EPINEPHRINE 1 MG/ML
0.3 INJECTION, SOLUTION, CONCENTRATE INTRAVENOUS PRN
OUTPATIENT
Start: 2025-03-28

## 2024-09-27 RX ORDER — ALBUTEROL SULFATE 90 UG/1
4 INHALANT RESPIRATORY (INHALATION) PRN
OUTPATIENT
Start: 2025-03-28

## 2024-09-27 RX ORDER — SODIUM CHLORIDE 9 MG/ML
INJECTION, SOLUTION INTRAVENOUS CONTINUOUS
OUTPATIENT
Start: 2025-03-28

## 2024-09-27 RX ORDER — LOSARTAN POTASSIUM 25 MG/1
25 TABLET ORAL DAILY
COMMUNITY
Start: 2024-08-12

## 2024-09-27 RX ORDER — ONDANSETRON 2 MG/ML
8 INJECTION INTRAMUSCULAR; INTRAVENOUS
OUTPATIENT
Start: 2025-03-28

## 2024-09-27 RX ORDER — ACETAMINOPHEN 325 MG/1
650 TABLET ORAL
OUTPATIENT
Start: 2025-03-28

## 2024-09-27 RX ADMIN — INCLISIRAN 284 MG: 284 INJECTION, SOLUTION SUBCUTANEOUS at 11:19

## 2024-09-27 ASSESSMENT — PAIN DESCRIPTION - DESCRIPTORS: DESCRIPTORS: SORE

## 2024-09-27 ASSESSMENT — PAIN - FUNCTIONAL ASSESSMENT: PAIN_FUNCTIONAL_ASSESSMENT: 0-10

## 2024-09-27 NOTE — PROGRESS NOTES
[facility] Progress Note    Date: 2024    Name: Darío Cespedes    MRN: [id number]         : 1950      Ms. Cespedes to [facility], ambulatory at 1100, here for her Leqvio injection every 6 months.    Pt was assessed and education was provided. Pt reports no adverse/side effects from previous Leqvio injections.    Ms. Cespedes's vitals were reviewed.  Vitals:    24 1103   BP: 119/68   Pulse: 75   Resp: 16   Temp: 97.7 °F (36.5 °C)   SpO2: 98%       Patient reports a fall on 24. Patient states she tripped over a rolled up rug and hit her coffee table. Patient denies LOC, hitting her head, or vision changes but does state she think she hit her neck/clavicle area on the table because she is having soreness to the R side of her neck and R clavicle area. She denies being evaluated after the fall and states that the pain is improving day by day. Patient then reported that she started a new blood pressure medication recently and sometimes gets dizzy and sweaty when she stands up. Denies chest pain or palpitations. Patient strongly encouraged that she should be evaluated in the emergency department due to her fall and new dizziness. She states that she doesn't want to go to the ER and be around all the germs and sick people but she will call her doctor to discuss.      Iclisiran Sodium (Leqvio) 284mg SQ was administered to LLQ abdomen.      Ms. Cespedes tolerated well.    Discharge instructions reviewed with patient, who expressed understanding. Emphasized again to patient to consider being evaluated for her symptoms. Patient's armband removed and shredded.    [facility] and  in stable condition at 1125. She is to return on 3/27/25 at 1100 for her next Leqvio injection.    Nancy Denton RN  2024  11:41 AM

## 2025-03-27 ENCOUNTER — HOSPITAL ENCOUNTER (OUTPATIENT)
Facility: HOSPITAL | Age: 75
Setting detail: INFUSION SERIES
Discharge: HOME | End: 2025-03-30
Payer: MEDICARE

## 2025-03-27 VITALS
SYSTOLIC BLOOD PRESSURE: 139 MMHG | OXYGEN SATURATION: 100 % | DIASTOLIC BLOOD PRESSURE: 80 MMHG | TEMPERATURE: 97 F | RESPIRATION RATE: 18 BRPM | HEART RATE: 74 BPM

## 2025-03-27 DIAGNOSIS — I65.29 STENOSIS OF CAROTID ARTERY, UNSPECIFIED LATERALITY: ICD-10-CM

## 2025-03-27 DIAGNOSIS — E78.2 MIXED HYPERLIPIDEMIA: Primary | ICD-10-CM

## 2025-03-27 PROCEDURE — 6360000002 HC RX W HCPCS: Performed by: FAMILY MEDICINE

## 2025-03-27 PROCEDURE — 96372 THER/PROPH/DIAG INJ SC/IM: CPT

## 2025-03-27 RX ORDER — SODIUM CHLORIDE 9 MG/ML
INJECTION, SOLUTION INTRAVENOUS CONTINUOUS
OUTPATIENT
Start: 2025-09-25

## 2025-03-27 RX ORDER — DIPHENHYDRAMINE HYDROCHLORIDE 50 MG/ML
50 INJECTION, SOLUTION INTRAMUSCULAR; INTRAVENOUS
OUTPATIENT
Start: 2025-09-25

## 2025-03-27 RX ORDER — ALBUTEROL SULFATE 90 UG/1
4 INHALANT RESPIRATORY (INHALATION) PRN
OUTPATIENT
Start: 2025-09-25

## 2025-03-27 RX ORDER — HYDROCORTISONE SODIUM SUCCINATE 100 MG/2ML
100 INJECTION INTRAMUSCULAR; INTRAVENOUS
OUTPATIENT
Start: 2025-09-25

## 2025-03-27 RX ORDER — EPINEPHRINE 1 MG/ML
0.3 INJECTION, SOLUTION, CONCENTRATE INTRAVENOUS PRN
OUTPATIENT
Start: 2025-09-25

## 2025-03-27 RX ORDER — ACETAMINOPHEN 325 MG/1
650 TABLET ORAL
OUTPATIENT
Start: 2025-09-25

## 2025-03-27 RX ORDER — ONDANSETRON 2 MG/ML
8 INJECTION INTRAMUSCULAR; INTRAVENOUS
OUTPATIENT
Start: 2025-09-25

## 2025-03-27 RX ADMIN — INCLISIRAN 284 MG: 284 INJECTION, SOLUTION SUBCUTANEOUS at 11:07

## 2025-03-27 NOTE — PROGRESS NOTES
[facility] Progress Note    Date: 2025    Name: Darío Cespedes    MRN: [id number]         : 1950      [facility]. [facility]es to [facility] at 1100, here for her Leqvio injection every 6 months. No complaints or concerns voiced.    Pt was assessed and education was provided. Pt reports tolerating leqvio without adverse effects.    Ms. Cespedes's vitals were reviewed.  Vitals:    25 1100   BP: 139/80   Pulse: 74   Resp: 18   Temp: 97 °F (36.1 °C)   SpO2: 100%     Iclisiran Sodium (Leqvio) 284mg SQ was administered to back of LEFT upper arm. No irritation or bleeding at site. Bandaid applied.    Ms. Cespedes tolerated well and had no complaints.      Discharge instructions reviewed with patient, who expressed understanding. Patient's armband removed and shredded.    Ms. Cespedes was [facility]ulatory and  in stable condition at 1110. She is to return on 25 at 1100 for her next Leqvio injection.    JEWEL BRICENO RN  2025  11:15 AM

## (undated) DEVICE — Device

## (undated) DEVICE — HEX-LOCKING BLADE ELECTRODE: Brand: EDGE

## (undated) DEVICE — REM POLYHESIVE ADULT PATIENT RETURN ELECTRODE: Brand: VALLEYLAB

## (undated) DEVICE — 10FR FRAZIER SUCTION HANDLE: Brand: CARDINAL HEALTH

## (undated) DEVICE — GOWN,REINFORCED,POLY,AURORA,XLARGE,STRL: Brand: MEDLINE

## (undated) DEVICE — PACKING 8004000 NEURAY 200PK 13X13MM: Brand: NEURAY ®

## (undated) DEVICE — SYRINGE MED 3ML NDL 22GA L1 1/2IN REG BVL SFGLDE

## (undated) DEVICE — TRAY MYEL SFTY +

## (undated) DEVICE — TRAY,URINE METER,100% SILICONE,16FR10ML: Brand: MEDLINE

## (undated) DEVICE — (D)PREP SKN CHLRAPRP APPL 26ML -- CONVERT TO ITEM 371833

## (undated) DEVICE — COLLAR CERV L H3.25X23IN M DENS FOAM COT STOCK CVR LO

## (undated) DEVICE — INTENDED FOR TISSUE SEPARATION, AND OTHER PROCEDURES THAT REQUIRE A SHARP SURGICAL BLADE TO PUNCTURE OR CUT.: Brand: BARD-PARKER ® STAINLESS STEEL BLADES

## (undated) DEVICE — (D)SYR 10ML 1/5ML GRAD NSAF -- PKGING CHANGE USE ITEM 338027

## (undated) DEVICE — BIPOLAR FORCEPS CORD: Brand: VALLEYLAB

## (undated) DEVICE — (D)NDL SPNE 22GX15CM -- DISC BY MFR W/NO SUB

## (undated) DEVICE — SUTURE STRATAFIX SYMMETRIC PDS + SZ 1 L18IN ABSRB VLT L48MM SXPP1A400

## (undated) DEVICE — (D)BNDG ADHESIVE FABRIC 3/4X3 -- DISC BY MFR USE ITEM 357960

## (undated) DEVICE — APPLICATOR BNDG 1MM ADH PREMIERPRO EXOFIN

## (undated) DEVICE — BITE BLOCK ENDOSCP UNIV AD 6 TO 9.4 MM

## (undated) DEVICE — OPTIFOAM GENTLE SA, POSTOP, 4X8: Brand: MEDLINE

## (undated) DEVICE — APPLIER CLP L9.38IN M LIG TI DISP STR RNG HNDL LIGACLP

## (undated) DEVICE — ADHESIVE SKIN CLOSURE 4X22 CM PREMIERPRO EXOFINFUSION DISP

## (undated) DEVICE — SCREW EXT FIX L14MM FOR DISTRCTN

## (undated) DEVICE — CATH IV SAFE STR 22GX1IN BLU -- PROTECTIV PLUS

## (undated) DEVICE — SUTURE MCRYL SZ 3-0 L27IN ABSRB UD L24MM PS-1 3/8 CIR PRIM Y936H

## (undated) DEVICE — 3.0MM PRECISION NEURO (MATCH HEAD)

## (undated) DEVICE — INSULATED BLADE ELECTRODE: Brand: EDGE

## (undated) DEVICE — MEDIA CONTRAST 10ML 200MG/ML 41%

## (undated) DEVICE — BLANKET WRM AD W50XL85.8IN PACU FULL BODY FORC AIR

## (undated) DEVICE — SUTURE VCRL SZ 3-0 L27IN ABSRB UD L26MM SH 1/2 CIR J416H

## (undated) DEVICE — FORCEPS BX L240CM JAW DIA2.8MM L CAP W/ NDL MIC MESH TOOTH

## (undated) DEVICE — 3M™ TEGADERM™ TRANSPARENT FILM DRESSING FRAME STYLE, 1626W, 4 IN X 4-3/4 IN (10 CM X 12 CM), 50/CT 4CT/CASE: Brand: 3M™ TEGADERM™

## (undated) DEVICE — SPONGE DISSECT PNUT SM 3/8IN -- 5/PK

## (undated) DEVICE — KIT CLN UP BON SECOURS MARYV

## (undated) DEVICE — PREP CHLORAPREP 10.5 ML ORG --

## (undated) DEVICE — DRESSING FOAM DISK DIA1IN H 7MM HYDRPHLC CHG IMPREG IN SL

## (undated) DEVICE — INTENDED FOR TISSUE SEPARATION, AND OTHER PROCEDURES THAT REQUIRE A SHARP SURGICAL BLADE TO PUNCTURE OR CUT.: Brand: BARD-PARKER SAFETY BLADES SIZE 10, STERILE

## (undated) DEVICE — JACKSON TABLE POSITIONER KIT: Brand: MEDLINE INDUSTRIES, INC.

## (undated) DEVICE — MEDI-VAC NON-CONDUCTIVE SUCTION TUBING: Brand: CARDINAL HEALTH

## (undated) DEVICE — STOCKING ANTIEMB KNEE MED REG --

## (undated) DEVICE — SSC BONE WAX: Brand: SSC BONE WAX

## (undated) DEVICE — WAX SURG 2.5GM HEMSTAT BNE BEESWAX PARAFFIN ISO PALMITATE

## (undated) DEVICE — FLEX ADVANTAGE 1500CC: Brand: FLEX ADVANTAGE

## (undated) DEVICE — STERILE LATEX POWDER-FREE SURGICAL GLOVESWITH NITRILE COATING: Brand: PROTEXIS

## (undated) DEVICE — FORCEPS ENDOSCP BX L230CM DIA2.8MM ALGTR CUP SPEC RETRV GI

## (undated) DEVICE — ARGYLE FRAZIER SURGICAL SUCTION INSTRUMENT 10 FR/CH (3.3 MM): Brand: ARGYLE

## (undated) DEVICE — STERILE POLYISOPRENE POWDER-FREE SURGICAL GLOVES: Brand: PROTEXIS

## (undated) DEVICE — NDL SPNE MANAN 22GX6IN --

## (undated) DEVICE — SUTURE STRATAFIX SYMMETRIC PDS + SZ 2-0 L18IN ABSRB VLT SXPP1A403

## (undated) DEVICE — AIRLIFE™ NASAL OXYGEN CANNULA CURVED, NONFLARED TIP WITH 14 FOOT (4.3 M) CRUSH-RESISTANT TUBING, OVER-THE-EAR STYLE: Brand: AIRLIFE™

## (undated) DEVICE — FLEX ADVANTAGE 3000CC: Brand: FLEX ADVANTAGE

## (undated) DEVICE — DRAIN SURG W7MMXL20CM SIL FULL PERF HUBLESS FLAT RADPQ STRP

## (undated) DEVICE — SOLUTION IV 1000ML 0.9% SOD CHL

## (undated) DEVICE — 3M™ TEGADERM™ TRANSPARENT FILM DRESSING FRAME STYLE, 1626, 4 IN X 4-3/4 IN (10 CM X 12 CM), 50/CT 4CT/CASE: Brand: 3M™ TEGADERM™

## (undated) DEVICE — SPIROMETER INCENT 2500ML W ONE W VLV

## (undated) DEVICE — BIT DRL DIA2.4MM DISP FOR VUEPOINT II SYS

## (undated) DEVICE — GARMENT,MEDLINE,DVT,INT,CALF,MED, GEN2: Brand: MEDLINE